# Patient Record
Sex: FEMALE | Race: BLACK OR AFRICAN AMERICAN | Employment: OTHER | URBAN - METROPOLITAN AREA
[De-identification: names, ages, dates, MRNs, and addresses within clinical notes are randomized per-mention and may not be internally consistent; named-entity substitution may affect disease eponyms.]

---

## 2022-05-17 ENCOUNTER — APPOINTMENT (OUTPATIENT)
Dept: CT IMAGING | Age: 87
End: 2022-05-17
Attending: EMERGENCY MEDICINE
Payer: MEDICAID

## 2022-05-17 ENCOUNTER — HOSPITAL ENCOUNTER (EMERGENCY)
Age: 87
Discharge: ANOTHER HOSPITAL | End: 2022-05-18
Attending: EMERGENCY MEDICINE
Payer: MEDICAID

## 2022-05-17 ENCOUNTER — APPOINTMENT (OUTPATIENT)
Dept: GENERAL RADIOLOGY | Age: 87
End: 2022-05-17
Attending: EMERGENCY MEDICINE
Payer: MEDICAID

## 2022-05-17 DIAGNOSIS — I63.511 ACUTE RIGHT MCA STROKE (HCC): Primary | ICD-10-CM

## 2022-05-17 LAB
ALBUMIN SERPL-MCNC: 3 G/DL (ref 3.5–5)
ALBUMIN/GLOB SERPL: 0.6 {RATIO} (ref 1.1–2.2)
ALP SERPL-CCNC: 103 U/L (ref 45–117)
ALT SERPL-CCNC: 20 U/L (ref 12–78)
ANION GAP SERPL CALC-SCNC: ABNORMAL MMOL/L (ref 5–15)
AST SERPL-CCNC: 26 U/L (ref 15–37)
BASE EXCESS BLD CALC-SCNC: 8.8 MMOL/L
BILIRUB SERPL-MCNC: 0.5 MG/DL (ref 0.2–1)
BUN SERPL-MCNC: 17 MG/DL (ref 6–20)
BUN/CREAT SERPL: 13 (ref 12–20)
CA-I BLD-MCNC: 1.21 MMOL/L (ref 1.12–1.32)
CALCIUM SERPL-MCNC: 9.5 MG/DL (ref 8.5–10.1)
CHLORIDE BLD-SCNC: 99 MMOL/L (ref 100–108)
CHLORIDE SERPL-SCNC: 102 MMOL/L (ref 97–108)
CO2 BLD-SCNC: 40 MMOL/L (ref 19–24)
CO2 SERPL-SCNC: 39 MMOL/L (ref 21–32)
COMMENT, HOLDF: NORMAL
CREAT SERPL-MCNC: 1.31 MG/DL (ref 0.55–1.02)
CREAT UR-MCNC: 1.3 MG/DL (ref 0.6–1.3)
GLOBULIN SER CALC-MCNC: 4.7 G/DL (ref 2–4)
GLUCOSE BLD STRIP.AUTO-MCNC: 109 MG/DL (ref 74–106)
GLUCOSE BLD STRIP.AUTO-MCNC: 111 MG/DL (ref 65–117)
GLUCOSE SERPL-MCNC: 114 MG/DL (ref 65–100)
HCO3 BLDA-SCNC: 39 MMOL/L
INR PPP: 1.1 (ref 0.9–1.1)
LACTATE BLD-SCNC: 0.9 MMOL/L (ref 0.4–2)
PCO2 BLDV: 76.9 MMHG (ref 41–51)
PH BLDV: 7.31 [PH] (ref 7.32–7.42)
PO2 BLDV: 17 MMHG (ref 25–40)
POTASSIUM BLD-SCNC: 3.4 MMOL/L (ref 3.5–5.5)
POTASSIUM SERPL-SCNC: 3.4 MMOL/L (ref 3.5–5.1)
PROT SERPL-MCNC: 7.7 G/DL (ref 6.4–8.2)
PROTHROMBIN TIME: 11.2 SEC (ref 9–11.1)
SAMPLES BEING HELD,HOLD: NORMAL
SERVICE CMNT-IMP: NORMAL
SODIUM BLD-SCNC: 146 MMOL/L (ref 136–145)
SODIUM SERPL-SCNC: 140 MMOL/L (ref 136–145)
SPECIMEN SITE: ABNORMAL

## 2022-05-17 PROCEDURE — 99285 EMERGENCY DEPT VISIT HI MDM: CPT

## 2022-05-17 PROCEDURE — 84132 ASSAY OF SERUM POTASSIUM: CPT

## 2022-05-17 PROCEDURE — 80179 DRUG ASSAY SALICYLATE: CPT

## 2022-05-17 PROCEDURE — 0042T CT CODE NEURO PERF W CBF: CPT

## 2022-05-17 PROCEDURE — 70450 CT HEAD/BRAIN W/O DYE: CPT

## 2022-05-17 PROCEDURE — 70496 CT ANGIOGRAPHY HEAD: CPT

## 2022-05-17 PROCEDURE — 71045 X-RAY EXAM CHEST 1 VIEW: CPT

## 2022-05-17 PROCEDURE — 82962 GLUCOSE BLOOD TEST: CPT

## 2022-05-17 PROCEDURE — 80053 COMPREHEN METABOLIC PANEL: CPT

## 2022-05-17 PROCEDURE — 74011000636 HC RX REV CODE- 636: Performed by: EMERGENCY MEDICINE

## 2022-05-17 PROCEDURE — 36415 COLL VENOUS BLD VENIPUNCTURE: CPT

## 2022-05-17 PROCEDURE — 80143 DRUG ASSAY ACETAMINOPHEN: CPT

## 2022-05-17 PROCEDURE — 85610 PROTHROMBIN TIME: CPT

## 2022-05-17 PROCEDURE — 96374 THER/PROPH/DIAG INJ IV PUSH: CPT

## 2022-05-17 PROCEDURE — 85025 COMPLETE CBC W/AUTO DIFF WBC: CPT

## 2022-05-17 RX ORDER — SODIUM CHLORIDE 9 MG/ML
50 INJECTION, SOLUTION INTRAVENOUS ONCE
Status: DISCONTINUED | OUTPATIENT
Start: 2022-05-17 | End: 2022-05-18 | Stop reason: HOSPADM

## 2022-05-17 RX ORDER — LABETALOL HYDROCHLORIDE 5 MG/ML
10 INJECTION, SOLUTION INTRAVENOUS
Status: COMPLETED | OUTPATIENT
Start: 2022-05-17 | End: 2022-05-18

## 2022-05-17 RX ADMIN — IOPAMIDOL 100 ML: 755 INJECTION, SOLUTION INTRAVENOUS at 23:57

## 2022-05-18 ENCOUNTER — ANESTHESIA (OUTPATIENT)
Dept: INTERVENTIONAL RADIOLOGY/VASCULAR | Age: 87
DRG: 024 | End: 2022-05-18
Payer: MEDICARE

## 2022-05-18 ENCOUNTER — APPOINTMENT (OUTPATIENT)
Dept: CT IMAGING | Age: 87
DRG: 024 | End: 2022-05-18
Attending: NURSE PRACTITIONER
Payer: MEDICARE

## 2022-05-18 ENCOUNTER — HOSPITAL ENCOUNTER (INPATIENT)
Dept: INTERVENTIONAL RADIOLOGY/VASCULAR | Age: 87
Discharge: HOME | DRG: 024 | End: 2022-05-18
Attending: RADIOLOGY
Payer: MEDICARE

## 2022-05-18 ENCOUNTER — APPOINTMENT (OUTPATIENT)
Dept: GENERAL RADIOLOGY | Age: 87
DRG: 024 | End: 2022-05-18
Attending: INTERNAL MEDICINE
Payer: MEDICARE

## 2022-05-18 ENCOUNTER — HOSPITAL ENCOUNTER (INPATIENT)
Age: 87
LOS: 5 days | Discharge: NURSING, REHAB OR LONG-TERM CARE | DRG: 024 | End: 2022-05-23
Attending: EMERGENCY MEDICINE | Admitting: ANESTHESIOLOGY
Payer: MEDICARE

## 2022-05-18 ENCOUNTER — ANESTHESIA EVENT (OUTPATIENT)
Dept: INTERVENTIONAL RADIOLOGY/VASCULAR | Age: 87
DRG: 024 | End: 2022-05-18
Payer: MEDICARE

## 2022-05-18 VITALS
DIASTOLIC BLOOD PRESSURE: 78 MMHG | BODY MASS INDEX: 30.19 KG/M2 | SYSTOLIC BLOOD PRESSURE: 151 MMHG | OXYGEN SATURATION: 97 % | HEIGHT: 66 IN | RESPIRATION RATE: 22 BRPM | WEIGHT: 187.83 LBS | TEMPERATURE: 97.3 F | HEART RATE: 60 BPM

## 2022-05-18 DIAGNOSIS — R41.82 ALTERED MENTAL STATUS, UNSPECIFIED ALTERED MENTAL STATUS TYPE: ICD-10-CM

## 2022-05-18 DIAGNOSIS — I63.311 CEREBROVASCULAR ACCIDENT (CVA) DUE TO THROMBOSIS OF RIGHT MIDDLE CEREBRAL ARTERY (HCC): Primary | ICD-10-CM

## 2022-05-18 DIAGNOSIS — Z51.5 PALLIATIVE CARE BY SPECIALIST: ICD-10-CM

## 2022-05-18 DIAGNOSIS — Z71.89 GOALS OF CARE, COUNSELING/DISCUSSION: ICD-10-CM

## 2022-05-18 DIAGNOSIS — R94.01 ABNORMAL EEG: ICD-10-CM

## 2022-05-18 DIAGNOSIS — R53.1 LEFT-SIDED WEAKNESS: ICD-10-CM

## 2022-05-18 PROBLEM — I63.9 STROKE (CEREBRUM) (HCC): Status: ACTIVE | Noted: 2022-05-18

## 2022-05-18 LAB
ALBUMIN SERPL-MCNC: 2.7 G/DL (ref 3.5–5)
ALBUMIN/GLOB SERPL: 0.6 {RATIO} (ref 1.1–2.2)
ALP SERPL-CCNC: 90 U/L (ref 45–117)
ALT SERPL-CCNC: 16 U/L (ref 12–78)
ANION GAP SERPL CALC-SCNC: 1 MMOL/L (ref 5–15)
APAP SERPL-MCNC: <2 UG/ML (ref 10–30)
AST SERPL-CCNC: 24 U/L (ref 15–37)
ATRIAL RATE: 66 BPM
BASOPHILS # BLD: 0.1 K/UL (ref 0–0.1)
BASOPHILS NFR BLD: 1 % (ref 0–1)
BILIRUB SERPL-MCNC: 0.5 MG/DL (ref 0.2–1)
BUN SERPL-MCNC: 15 MG/DL (ref 6–20)
BUN/CREAT SERPL: 14 (ref 12–20)
CALCIUM SERPL-MCNC: 9 MG/DL (ref 8.5–10.1)
CALCULATED P AXIS, ECG09: -22 DEGREES
CALCULATED R AXIS, ECG10: 50 DEGREES
CALCULATED T AXIS, ECG11: -13 DEGREES
CHLORIDE SERPL-SCNC: 101 MMOL/L (ref 97–108)
CHOLEST SERPL-MCNC: 170 MG/DL
CO2 SERPL-SCNC: 37 MMOL/L (ref 21–32)
COMMENT, HOLDF: NORMAL
COVID-19 RAPID TEST, COVR: NOT DETECTED
CREAT SERPL-MCNC: 1.04 MG/DL (ref 0.55–1.02)
DIAGNOSIS, 93000: NORMAL
DIFFERENTIAL METHOD BLD: ABNORMAL
EOSINOPHIL # BLD: 0.3 K/UL (ref 0–0.4)
EOSINOPHIL NFR BLD: 4 % (ref 0–7)
ERYTHROCYTE [DISTWIDTH] IN BLOOD BY AUTOMATED COUNT: 13.4 % (ref 11.5–14.5)
ERYTHROCYTE [DISTWIDTH] IN BLOOD BY AUTOMATED COUNT: 13.7 % (ref 11.5–14.5)
EST. AVERAGE GLUCOSE BLD GHB EST-MCNC: 120 MG/DL
GLOBULIN SER CALC-MCNC: 4.5 G/DL (ref 2–4)
GLUCOSE BLD STRIP.AUTO-MCNC: 109 MG/DL (ref 65–117)
GLUCOSE BLD STRIP.AUTO-MCNC: 111 MG/DL (ref 65–117)
GLUCOSE BLD STRIP.AUTO-MCNC: 88 MG/DL (ref 65–117)
GLUCOSE BLD STRIP.AUTO-MCNC: 93 MG/DL (ref 65–117)
GLUCOSE SERPL-MCNC: 123 MG/DL (ref 65–100)
HBA1C MFR BLD: 5.8 % (ref 4–5.6)
HCT VFR BLD AUTO: 35.2 % (ref 35–47)
HCT VFR BLD AUTO: 35.2 % (ref 35–47)
HDLC SERPL-MCNC: 44 MG/DL
HDLC SERPL: 3.9 {RATIO} (ref 0–5)
HGB BLD-MCNC: 10.8 G/DL (ref 11.5–16)
HGB BLD-MCNC: 10.9 G/DL (ref 11.5–16)
IMM GRANULOCYTES # BLD AUTO: 0 K/UL (ref 0–0.04)
IMM GRANULOCYTES NFR BLD AUTO: 0 % (ref 0–0.5)
LDLC SERPL CALC-MCNC: 111.8 MG/DL (ref 0–100)
LYMPHOCYTES # BLD: 0.7 K/UL (ref 0.8–3.5)
LYMPHOCYTES NFR BLD: 10 % (ref 12–49)
MAGNESIUM SERPL-MCNC: 1.9 MG/DL (ref 1.6–2.4)
MCH RBC QN AUTO: 28.3 PG (ref 26–34)
MCH RBC QN AUTO: 28.5 PG (ref 26–34)
MCHC RBC AUTO-ENTMCNC: 30.7 G/DL (ref 30–36.5)
MCHC RBC AUTO-ENTMCNC: 31 G/DL (ref 30–36.5)
MCV RBC AUTO: 91.9 FL (ref 80–99)
MCV RBC AUTO: 92.4 FL (ref 80–99)
MONOCYTES # BLD: 0.7 K/UL (ref 0–1)
MONOCYTES NFR BLD: 9 % (ref 5–13)
NEUTS SEG # BLD: 5.5 K/UL (ref 1.8–8)
NEUTS SEG NFR BLD: 76 % (ref 32–75)
NRBC # BLD: 0 K/UL (ref 0–0.01)
NRBC # BLD: 0 K/UL (ref 0–0.01)
NRBC BLD-RTO: 0 PER 100 WBC
NRBC BLD-RTO: 0 PER 100 WBC
P-R INTERVAL, ECG05: 212 MS
PHOSPHATE SERPL-MCNC: 2.6 MG/DL (ref 2.6–4.7)
PLATELET # BLD AUTO: 193 K/UL (ref 150–400)
PLATELET # BLD AUTO: 194 K/UL (ref 150–400)
PMV BLD AUTO: 10.9 FL (ref 8.9–12.9)
PMV BLD AUTO: 11.2 FL (ref 8.9–12.9)
POTASSIUM SERPL-SCNC: 3.7 MMOL/L (ref 3.5–5.1)
PROT SERPL-MCNC: 7.2 G/DL (ref 6.4–8.2)
Q-T INTERVAL, ECG07: 418 MS
QRS DURATION, ECG06: 86 MS
QTC CALCULATION (BEZET), ECG08: 418 MS
RBC # BLD AUTO: 3.81 M/UL (ref 3.8–5.2)
RBC # BLD AUTO: 3.83 M/UL (ref 3.8–5.2)
RBC MORPH BLD: ABNORMAL
SALICYLATES SERPL-MCNC: <1.7 MG/DL (ref 2.8–20)
SAMPLES BEING HELD,HOLD: NORMAL
SERVICE CMNT-IMP: NORMAL
SODIUM SERPL-SCNC: 139 MMOL/L (ref 136–145)
SOURCE, COVRS: NORMAL
TRIGL SERPL-MCNC: 71 MG/DL (ref ?–150)
TSH SERPL DL<=0.05 MIU/L-ACNC: 2.65 UIU/ML (ref 0.36–3.74)
VENTRICULAR RATE, ECG03: 60 BPM
VLDLC SERPL CALC-MCNC: 14.2 MG/DL
WBC # BLD AUTO: 7.3 K/UL (ref 3.6–11)
WBC # BLD AUTO: 7.9 K/UL (ref 3.6–11)

## 2022-05-18 PROCEDURE — 74011000250 HC RX REV CODE- 250: Performed by: NURSE PRACTITIONER

## 2022-05-18 PROCEDURE — 99223 1ST HOSP IP/OBS HIGH 75: CPT | Performed by: PSYCHIATRY & NEUROLOGY

## 2022-05-18 PROCEDURE — 77030008584 HC TOOL GDWRE DEV TERU -A

## 2022-05-18 PROCEDURE — C1769 GUIDE WIRE: HCPCS

## 2022-05-18 PROCEDURE — C1894 INTRO/SHEATH, NON-LASER: HCPCS

## 2022-05-18 PROCEDURE — 65610000006 HC RM INTENSIVE CARE

## 2022-05-18 PROCEDURE — C1887 CATHETER, GUIDING: HCPCS

## 2022-05-18 PROCEDURE — 74011250636 HC RX REV CODE- 250/636

## 2022-05-18 PROCEDURE — 74011000250 HC RX REV CODE- 250: Performed by: INTERNAL MEDICINE

## 2022-05-18 PROCEDURE — 77030012468 HC VLV BLEEDBK CNTRL ABBT -B

## 2022-05-18 PROCEDURE — 97530 THERAPEUTIC ACTIVITIES: CPT

## 2022-05-18 PROCEDURE — APPNB45 APP NON BILLABLE 31-45 MINUTES: Performed by: NURSE PRACTITIONER

## 2022-05-18 PROCEDURE — 97166 OT EVAL MOD COMPLEX 45 MIN: CPT

## 2022-05-18 PROCEDURE — 74011000250 HC RX REV CODE- 250: Performed by: EMERGENCY MEDICINE

## 2022-05-18 PROCEDURE — 85027 COMPLETE CBC AUTOMATED: CPT

## 2022-05-18 PROCEDURE — 82962 GLUCOSE BLOOD TEST: CPT

## 2022-05-18 PROCEDURE — 77030013797 HC KT TRNSDUC PRSSR EDWD -A

## 2022-05-18 PROCEDURE — 77030035304 HC CATH ANGI BENCHMARK PENU -G

## 2022-05-18 PROCEDURE — 83036 HEMOGLOBIN GLYCOSYLATED A1C: CPT

## 2022-05-18 PROCEDURE — 84100 ASSAY OF PHOSPHORUS: CPT

## 2022-05-18 PROCEDURE — 95816 EEG AWAKE AND DROWSY: CPT | Performed by: NURSE PRACTITIONER

## 2022-05-18 PROCEDURE — 2709999900 HC NON-CHARGEABLE SUPPLY

## 2022-05-18 PROCEDURE — 36415 COLL VENOUS BLD VENIPUNCTURE: CPT

## 2022-05-18 PROCEDURE — C1760 CLOSURE DEV, VASC: HCPCS

## 2022-05-18 PROCEDURE — 93005 ELECTROCARDIOGRAM TRACING: CPT

## 2022-05-18 PROCEDURE — 87635 SARS-COV-2 COVID-19 AMP PRB: CPT

## 2022-05-18 PROCEDURE — 77030008638 HC TU CONN COOK -A

## 2022-05-18 PROCEDURE — 77030003394 HC NDL ART COOK -A

## 2022-05-18 PROCEDURE — 80061 LIPID PANEL: CPT

## 2022-05-18 PROCEDURE — 71045 X-RAY EXAM CHEST 1 VIEW: CPT

## 2022-05-18 PROCEDURE — 74011250636 HC RX REV CODE- 250/636: Performed by: NURSE PRACTITIONER

## 2022-05-18 PROCEDURE — 97535 SELF CARE MNGMENT TRAINING: CPT

## 2022-05-18 PROCEDURE — 99285 EMERGENCY DEPT VISIT HI MDM: CPT

## 2022-05-18 PROCEDURE — 03CG3ZZ EXTIRPATION OF MATTER FROM INTRACRANIAL ARTERY, PERCUTANEOUS APPROACH: ICD-10-PCS | Performed by: RADIOLOGY

## 2022-05-18 PROCEDURE — 96374 THER/PROPH/DIAG INJ IV PUSH: CPT

## 2022-05-18 PROCEDURE — 74011000250 HC RX REV CODE- 250

## 2022-05-18 PROCEDURE — 77030029997 HC DEV COM RDL R BND TELE -B

## 2022-05-18 PROCEDURE — 70450 CT HEAD/BRAIN W/O DYE: CPT

## 2022-05-18 PROCEDURE — B31R1ZZ FLUOROSCOPY OF INTRACRANIAL ARTERIES USING LOW OSMOLAR CONTRAST: ICD-10-PCS | Performed by: RADIOLOGY

## 2022-05-18 PROCEDURE — 77030020268 HC MISC GENERAL SUPPLY

## 2022-05-18 PROCEDURE — 74011000250 HC RX REV CODE- 250: Performed by: RADIOLOGY

## 2022-05-18 PROCEDURE — 92610 EVALUATE SWALLOWING FUNCTION: CPT

## 2022-05-18 PROCEDURE — 83735 ASSAY OF MAGNESIUM: CPT

## 2022-05-18 PROCEDURE — 74011250636 HC RX REV CODE- 250/636: Performed by: RADIOLOGY

## 2022-05-18 PROCEDURE — 61645 PERQ ART M-THROMBECT &/NFS: CPT

## 2022-05-18 PROCEDURE — 80053 COMPREHEN METABOLIC PANEL: CPT

## 2022-05-18 PROCEDURE — 74011000636 HC RX REV CODE- 636: Performed by: RADIOLOGY

## 2022-05-18 PROCEDURE — 97161 PT EVAL LOW COMPLEX 20 MIN: CPT

## 2022-05-18 PROCEDURE — 84443 ASSAY THYROID STIM HORMONE: CPT

## 2022-05-18 RX ORDER — MAGNESIUM SULFATE 100 %
4 CRYSTALS MISCELLANEOUS AS NEEDED
Status: DISCONTINUED | OUTPATIENT
Start: 2022-05-18 | End: 2022-05-24 | Stop reason: HOSPADM

## 2022-05-18 RX ORDER — ATORVASTATIN CALCIUM 40 MG/1
40 TABLET, FILM COATED ORAL DAILY
Status: DISCONTINUED | OUTPATIENT
Start: 2022-05-18 | End: 2022-05-19

## 2022-05-18 RX ORDER — INSULIN LISPRO 100 [IU]/ML
INJECTION, SOLUTION INTRAVENOUS; SUBCUTANEOUS EVERY 6 HOURS
Status: DISCONTINUED | OUTPATIENT
Start: 2022-05-18 | End: 2022-05-19

## 2022-05-18 RX ORDER — SODIUM CHLORIDE 0.9 % (FLUSH) 0.9 %
5-40 SYRINGE (ML) INJECTION AS NEEDED
Status: DISCONTINUED | OUTPATIENT
Start: 2022-05-18 | End: 2022-05-24 | Stop reason: HOSPADM

## 2022-05-18 RX ORDER — LEVOTHYROXINE SODIUM 75 UG/1
75 TABLET ORAL
Status: DISCONTINUED | OUTPATIENT
Start: 2022-05-18 | End: 2022-05-18

## 2022-05-18 RX ORDER — BRIMONIDINE TARTRATE 2 MG/ML
1 SOLUTION/ DROPS OPHTHALMIC 2 TIMES DAILY
Status: DISCONTINUED | OUTPATIENT
Start: 2022-05-18 | End: 2022-05-24 | Stop reason: HOSPADM

## 2022-05-18 RX ORDER — HEPARIN SODIUM 1000 [USP'U]/ML
2000 INJECTION, SOLUTION INTRAVENOUS; SUBCUTANEOUS ONCE
Status: COMPLETED | OUTPATIENT
Start: 2022-05-18 | End: 2022-05-18

## 2022-05-18 RX ORDER — VERAPAMIL HYDROCHLORIDE 2.5 MG/ML
INJECTION, SOLUTION INTRAVENOUS
Status: COMPLETED
Start: 2022-05-18 | End: 2022-05-18

## 2022-05-18 RX ORDER — LEVOTHYROXINE SODIUM 125 UG/1
125 TABLET ORAL
COMMUNITY
End: 2022-05-23

## 2022-05-18 RX ORDER — SODIUM CHLORIDE, SODIUM LACTATE, POTASSIUM CHLORIDE, CALCIUM CHLORIDE 600; 310; 30; 20 MG/100ML; MG/100ML; MG/100ML; MG/100ML
50 INJECTION, SOLUTION INTRAVENOUS CONTINUOUS
Status: DISPENSED | OUTPATIENT
Start: 2022-05-18 | End: 2022-05-19

## 2022-05-18 RX ORDER — ACETAMINOPHEN 325 MG/1
650 TABLET ORAL
Status: DISCONTINUED | OUTPATIENT
Start: 2022-05-18 | End: 2022-05-24 | Stop reason: HOSPADM

## 2022-05-18 RX ORDER — LIDOCAINE HYDROCHLORIDE 20 MG/ML
10 INJECTION, SOLUTION INFILTRATION; PERINEURAL
Status: COMPLETED | OUTPATIENT
Start: 2022-05-18 | End: 2022-05-18

## 2022-05-18 RX ORDER — TIMOLOL MALEATE 5 MG/ML
1 SOLUTION/ DROPS OPHTHALMIC 2 TIMES DAILY
Status: DISCONTINUED | OUTPATIENT
Start: 2022-05-18 | End: 2022-05-24 | Stop reason: HOSPADM

## 2022-05-18 RX ORDER — HEPARIN SODIUM 1000 [USP'U]/ML
INJECTION, SOLUTION INTRAVENOUS; SUBCUTANEOUS
Status: COMPLETED
Start: 2022-05-18 | End: 2022-05-18

## 2022-05-18 RX ORDER — LEVOTHYROXINE SODIUM 112 UG/1
112 TABLET ORAL
Status: DISCONTINUED | OUTPATIENT
Start: 2022-05-19 | End: 2022-05-24 | Stop reason: HOSPADM

## 2022-05-18 RX ORDER — TORSEMIDE 10 MG/1
10 TABLET ORAL DAILY
Status: ON HOLD | COMMUNITY
End: 2022-09-26

## 2022-05-18 RX ORDER — ESMOLOL HYDROCHLORIDE 10 MG/ML
0-200 INJECTION, SOLUTION INTRAVENOUS
Status: DISCONTINUED | OUTPATIENT
Start: 2022-05-18 | End: 2022-05-18 | Stop reason: HOSPADM

## 2022-05-18 RX ORDER — VERAPAMIL HYDROCHLORIDE 2.5 MG/ML
2.5 INJECTION, SOLUTION INTRAVENOUS ONCE
Status: COMPLETED | OUTPATIENT
Start: 2022-05-18 | End: 2022-05-18

## 2022-05-18 RX ORDER — LOSARTAN POTASSIUM 100 MG/1
100 TABLET ORAL DAILY
Status: ON HOLD | COMMUNITY
End: 2022-09-26

## 2022-05-18 RX ORDER — BRIMONIDINE TARTRATE, TIMOLOL MALEATE 2; 5 MG/ML; MG/ML
1 SOLUTION/ DROPS OPHTHALMIC 2 TIMES DAILY
COMMUNITY

## 2022-05-18 RX ORDER — ACETAMINOPHEN 650 MG/1
650 SUPPOSITORY RECTAL
Status: DISCONTINUED | OUTPATIENT
Start: 2022-05-18 | End: 2022-05-24 | Stop reason: HOSPADM

## 2022-05-18 RX ORDER — SODIUM CHLORIDE 0.9 % (FLUSH) 0.9 %
5-40 SYRINGE (ML) INJECTION EVERY 8 HOURS
Status: DISCONTINUED | OUTPATIENT
Start: 2022-05-18 | End: 2022-05-24 | Stop reason: HOSPADM

## 2022-05-18 RX ADMIN — HEPARIN SODIUM 2000 UNITS: 1000 INJECTION INTRAVENOUS; SUBCUTANEOUS at 01:48

## 2022-05-18 RX ADMIN — Medication 4000 UNITS: at 01:50

## 2022-05-18 RX ADMIN — TIMOLOL MALEATE 1 DROP: 5 SOLUTION OPHTHALMIC at 17:42

## 2022-05-18 RX ADMIN — Medication 4000 UNITS: at 01:45

## 2022-05-18 RX ADMIN — SODIUM CHLORIDE, PRESERVATIVE FREE 5 ML: 5 INJECTION INTRAVENOUS at 15:46

## 2022-05-18 RX ADMIN — Medication 4000 UNITS: at 01:46

## 2022-05-18 RX ADMIN — BRIMONIDINE TARTRATE 1 DROP: 2 SOLUTION/ DROPS OPHTHALMIC at 12:38

## 2022-05-18 RX ADMIN — SODIUM CHLORIDE 2.5 MG/HR: 9 INJECTION, SOLUTION INTRAVENOUS at 19:19

## 2022-05-18 RX ADMIN — TIMOLOL MALEATE 1 DROP: 5 SOLUTION OPHTHALMIC at 12:38

## 2022-05-18 RX ADMIN — SODIUM CHLORIDE, PRESERVATIVE FREE 10 ML: 5 INJECTION INTRAVENOUS at 20:11

## 2022-05-18 RX ADMIN — NITROGLYCERIN 200 MCG: 10 INJECTION INTRAVENOUS at 01:48

## 2022-05-18 RX ADMIN — HEPARIN SODIUM 2000 UNITS: 1000 INJECTION, SOLUTION INTRAVENOUS; SUBCUTANEOUS at 01:48

## 2022-05-18 RX ADMIN — SODIUM CHLORIDE 7.5 MG/HR: 9 INJECTION, SOLUTION INTRAVENOUS at 08:52

## 2022-05-18 RX ADMIN — Medication 4000 UNITS: at 01:48

## 2022-05-18 RX ADMIN — LIDOCAINE HYDROCHLORIDE 200 MG: 20 INJECTION, SOLUTION INFILTRATION; PERINEURAL at 08:07

## 2022-05-18 RX ADMIN — Medication 4000 UNITS: at 01:49

## 2022-05-18 RX ADMIN — VERAPAMIL HYDROCHLORIDE 2.5 MG: 2.5 INJECTION, SOLUTION INTRAVENOUS at 01:48

## 2022-05-18 RX ADMIN — SODIUM CHLORIDE 10 MG/HR: 9 INJECTION, SOLUTION INTRAVENOUS at 06:15

## 2022-05-18 RX ADMIN — Medication 4000 UNITS: at 01:47

## 2022-05-18 RX ADMIN — BRIMONIDINE TARTRATE 1 DROP: 2 SOLUTION/ DROPS OPHTHALMIC at 17:41

## 2022-05-18 RX ADMIN — SODIUM CHLORIDE 7.5 MG/HR: 9 INJECTION, SOLUTION INTRAVENOUS at 15:47

## 2022-05-18 RX ADMIN — SODIUM CHLORIDE 5 MG/HR: 9 INJECTION, SOLUTION INTRAVENOUS at 03:27

## 2022-05-18 RX ADMIN — LABETALOL HYDROCHLORIDE 10 MG: 5 INJECTION INTRAVENOUS at 00:00

## 2022-05-18 RX ADMIN — SODIUM CHLORIDE 7.5 MG/HR: 9 INJECTION, SOLUTION INTRAVENOUS at 12:23

## 2022-05-18 RX ADMIN — IOPAMIDOL 75 ML: 612 INJECTION, SOLUTION INTRAVENOUS at 02:30

## 2022-05-18 NOTE — H&P
CRITICAL CARE ADMISSION NOTE      Name: Akanksha Vivar   : 1928   MRN: [id number]   Date: 2022      Reason for ICU Admission: Acute right MCA stroke s/p mechanical thrombectomy    ICU PROBLEM LIST   Acute right MCA stroke  S/p mechanical thrombectomy    HISTORY OF PRESENT ILLNESS:   81 y/o female past medical history of hypertension, COPD, pulmonary HTN. HFpEF, PPM, diabetes, hypothyroidism, and chronic lumbar stenosis/dengenerative spondylolisthesis s/p laminectomy (walker at baseline) admitted  from ED [facility] for endovascular stroke treatment of acute right M1/M2 occlusion after presenting with acute onset of left-sided weakness and AMS. History otained per chart review she had acute onset of left-sided weakness ~2100. In ED hypertensive /, administered labetalol, CT demonstrated acute distal M1/proximal M2 right MCA occlusion, tPA not administered d/t persistent hypertension. Transferred emergently to [address] [place] [address] for endovascular stroke treatment. NIHSS prior to thrombectomy NIHSS 19.   Transferred to the ICU post thrombectomy for close neurological monitoring    24 HOUR EVENTS:   · Transferred to ICU post mechanical thrombectomy  · SBP goal 100-140 mm Hg  ·   NEUROLOGICAL:    Acute right MCA occlusion s/p mechanical thrombectomy  · Neurological checks  · Strict -140 mm Hg  · Brain MRI- pending  · PT/OT/SLP    PULMONOLOGY: Hx of COPD, pulmonary HTN, nocturnal oxygen   · Supplemental oxygen for saturation goal > 94 %  · Routine Pulmonary hygiene, Aspiration precautions    CARDIOVASCULAR: Hx of HFpEF, PPM   · Nicardipine for SBP goal 100-140 mm HG  · TTE- pending    GASTROINTESTINAL:   · NPO pending speech evaluation    RENAL/ELECTROLYTE/FLUIDS:   · Avoid nephrotoxins, goal urine output >0.5 cc/kg/hr    ENDOCRINE: Hx of Diabetes and hypothyroidism   · Hemoglobin A1c,TSH-pending  · Glucose Checks, SSI    HEMATOLOGY/ONCOLOGY:   · H/H Stable  · SCDs for DVT prophylaxis    ID/MICRO:   · Febrile, no signs/symptoms of infection    ANTIBIOTICS TO DATE:  · Not indicated  ·   CULTURES TO DATE:  · None    ICU DAILY CHECKLIST     Code Status: FULl  DVT Prophylaxis:SCDs  Tubes: None  Lines: Peripheral IV  Drains: None  PPI:Not indicated  Diet: NPO pending Speech Evaluation  PT/OT: Ordered  Goals of Care Discussion/PalliativeN/A   Plan of Care/Family Update:  Discussed Care Plan with Bedside RN    HOSPITAL COURSE/DAILY EVENT LOG       SUBJECTIVE:   Progress Note: 5/18/2022        Review of Systems:     ROS     Past Medical History:      has a past medical history of Arthritis, Diabetes ([address] Hard of hearing, Hypertension, Hypothyroidism, and Poor historian. Past Surgical History:      has a past surgical history that includes hx tubal ligation. Home Medications:     Prior to Admission medications    Medication Sig Start Date End Date Taking? Authorizing Provider   POTASSIUM CHLORIDE PO Take 40 mEq by mouth nightly. Provider, Historical   atorvastatin (LIPITOR) 20 mg tablet Take 20 mg by mouth daily. Provider, Historical   metFORMIN (GLUCOPHAGE) 500 mg tablet Take 500 mg by mouth two (2) times daily (with meals). Provider, Historical   levothyroxine (SYNTHROID) 75 mcg tablet Take 75 mcg by mouth Daily (before breakfast). Provider, Historical   chlorthalidone (HYGROTEN) 25 mg tablet Take 25 mg by mouth daily. Provider, Historical   telmisartan (MICARDIS) 80 mg tablet Take 80 mg by mouth daily. Provider, Historical       Allergies/Social/Family History:     No Known Allergies   Social History     Tobacco Use    Smoking status: Former Smoker    Smokeless tobacco: Not on file   Substance Use Topics    Alcohol use: No      No family history on file. OBJECTIVE:     Physical Exam  Vitals reviewed. Constitutional:       Appearance: She is ill-appearing. HENT:      Head: Normocephalic and atraumatic.       Nose: Nose normal.   Eyes:      Pupils: Pupils are equal, round, and reactive to light. Cardiovascular:      Rate and Rhythm: Regular rhythm. Pulses: Normal pulses. Heart sounds: Normal heart sounds. Abdominal:      General: Abdomen is flat. Bowel sounds are normal.      Palpations: Abdomen is soft. Musculoskeletal:         General: Normal range of motion. Cervical back: Normal range of motion. Right lower leg: Edema present. Left lower leg: No edema. Skin:     General: Skin is warm and dry. Neurological:      Mental Status: She is alert. Comments: Awake, + dysarthria, mild facial assymmetry moves RUE/RLE spontaneously, sensation intact  LUE +antigravity effort, LLE, slight movement on plan of bed   Psychiatric:         Mood and Affect: Mood normal.          Vital Signs:  Visit Vitals  BP (!) 170/98 (BP 1 Location: Left upper arm)   Pulse 64   Resp 20   SpO2 100%    O2 Flow Rate (L/min): 2 l/min O2 Device: Nasal cannula Temp (24hrs), Av.3 °F (36.3 °C), Min:97.3 °F (36.3 °C), Max:97.3 °F (36.3 °C)           Intake/Output:   No intake or output data in the 24 hours ending 22 0438      LABS AND  DATA: Personally reviewed  Recent Labs     22   WBC 7.3   HGB 10.9*   HCT 35.2        Recent Labs     22      K 3.4*      CO2 39*   BUN 17   CREA 1.31*   *   CA 9.5     Recent Labs     22  230      TP 7.7   ALB 3.0*   GLOB 4.7*     Recent Labs     22   INR 1.1   PTP 11.2*      No results for input(s): PHI, PCO2I, PO2I, FIO2I in the last 72 hours. No results for input(s): CPK, CKMB, TROIQ, BNPP in the last 72 hours. MEDS: Reviewed    Chest X-Ray:  CXR Results  (Last 48 hours)               22 0022  XR CHEST PORT Final result    Impression:  No acute process. Narrative:  INDICATION: ams       EXAM:  AP CHEST RADIOGRAPH       COMPARISON: None       FINDINGS:       AP portable view of the chest demonstrates normal heart size. Right subclavian   pacemaker. There is no edema, effusion, consolidation, or pneumothorax. The   osseous structures are unremarkable. ECHO:    No results found for this visit on 05/18/22. Multidisciplinary Rounds Completed:  Pending    ABCDEF Bundle/Checklist Completed:  Yes    SPECIAL EQUIPMENT  None    DISPOSITION  Stay in ICU    CRITICAL CARE DOCUMENTATION  I had a face to face encounter with the patient, reviewed and interpreted patient data including clinical events, labs, images, vital signs, I/O's, and examined patient. I have discussed the case and the plan and management of the patient's care with the consulting services, the bedside nurses and the respiratory therapist.      NOTE OF PERSONAL INVOLVEMENT IN CARE   This patient has a high probability of imminent, clinically significant deterioration, which requires the highest level of preparedness to intervene urgently. I participated in the decision-making and personally managed or directed the management of the following life and organ supporting interventions that required my frequent assessment to treat or prevent imminent deterioration. I personally spent 60 minutes of critical care time. This is time spent at this critically ill patient's bedside actively involved in patient care as well as the coordination of care. This does not include any procedural time which has been billed separately.     [address] [place] [address]  5/18/2022

## 2022-05-18 NOTE — ED PROVIDER NOTES
66-year-old female sent from [facility] with acute CVA with LVO in the right middle cerebral artery. Patient did not fall within the timeframe for tPA. Symptoms have not worsened. Neuro intervention will be taken patient for acute thrombectomy. Neurology team at bedside. Will be admitted to ICU.         Recent Results (from the past 24 hour(s))  -GLUCOSE, POC:   Collection Time: 05/17/22 10:56 PM       Result                      Value             Ref Range           Glucose (POC)               111               65 - 117 mg/*       Performed by                                                  Cee CARPIO  -CBC WITH AUTOMATED DIFF:   Collection Time: 05/17/22 11:00 PM       Result                      Value             Ref Range           WBC                         7.3               3.6 - 11.0 K*       RBC                         3.83              3.80 - 5.20 *       HGB                         10.9 (L)          11.5 - 16.0 *       HCT                         35.2              35.0 - 47.0 %       MCV                         91.9              80.0 - 99.0 *       MCH                         28.5              26.0 - 34.0 *       MCHC                        31.0              30.0 - 36.5 *       RDW                         13.7              11.5 - 14.5 %       PLATELET                    194               150 - 400 K/*       MPV                         10.9              8.9 - 12.9 FL       NRBC                        0.0               0  WBC       ABSOLUTE NRBC               0.00              0.00 - 0.01 *       NEUTROPHILS                 76 (H)            32 - 75 %           LYMPHOCYTES                 10 (L)            12 - 49 %           MONOCYTES                   9                 5 - 13 %            EOSINOPHILS                 4                 0 - 7 %             BASOPHILS                   1                 0 - 1 %             IMMATURE GRANULOCYTES       0                 0.0 - 0.5 % ABS. NEUTROPHILS            5.5               1.8 - 8.0 K/*       ABS. LYMPHOCYTES            0.7 (L)           0.8 - 3.5 K/*       ABS. MONOCYTES              0.7               0.0 - 1.0 K/*       ABS. EOSINOPHILS            0.3               0.0 - 0.4 K/*       ABS. BASOPHILS              0.1               0.0 - 0.1 K/*       ABS. IMM. GRANS.            0.0               0.00 - 0.04 *       DF                          SMEAR SCANNED                         RBC COMMENTS                                                  NORMOCYTIC, NORMOCHROMIC  -METABOLIC PANEL, COMPREHENSIVE:   Collection Time: 05/17/22 11:00 PM       Result                      Value             Ref Range           Sodium                      140               136 - 145 mm*       Potassium                   3.4 (L)           3.5 - 5.1 mm*       Chloride                    102               97 - 108 mmo*       CO2                         39 (H)            21 - 32 mmol*       Anion gap                   NEG 1             5 - 15 mmol/L       Glucose                     114 (H)           65 - 100 mg/*       BUN                         17                6 - 20 MG/DL        Creatinine                  1.31 (H)          0.55 - 1.02 *       BUN/Creatinine ratio        13                12 - 20             GFR est AA                  46 (L)            >60 ml/min/1*       GFR est non-AA              38 (L)            >60 ml/min/1*       Calcium                     9.5               8.5 - 10.1 M*       Bilirubin, total            0.5               0.2 - 1.0 MG*       ALT (SGPT)                  20                12 - 78 U/L         AST (SGOT)                  26                15 - 37 U/L         Alk.  phosphatase            103               45 - 117 U/L        Protein, total              7.7               6.4 - 8.2 g/*       Albumin                     3.0 (L)           3.5 - 5.0 g/*       Globulin                    4.7 (H)           2.0 - 4.0 g/* A-G Ratio                   0.6 (L)           1.1 - 2.2      -SAMPLES BEING HELD:   Collection Time: 05/17/22 11:00 PM       Result                      Value             Ref Range           SAMPLES BEING HELD          BLUE, PST                             COMMENT                                                       Add-on orders for these samples will be processed based on acceptable specimen integrity and analyte stability, which may vary by analyte.   -PROTHROMBIN TIME + INR:   Collection Time: 05/17/22 11:00 PM       Result                      Value             Ref Range           INR                         1.1               0.9 - 1.1           Prothrombin time            11.2 (H)          9.0 - 51.6 s*  -SALICYLATE:   Collection Time: 05/17/22 11:01 PM       Result                      Value             Ref Range           Salicylate level            <1.7 (L)          2.8 - 20.0 M*  -ACETAMINOPHEN:   Collection Time: 05/17/22 11:01 PM       Result                      Value             Ref Range           Acetaminophen level         <2 (L)            10 - 30 ug/mL  -BLOOD GAS,CHEM8,LACTIC ACID POC:   Collection Time: 05/17/22 11:05 PM       Result                      Value             Ref Range           Calcium, ionized (POC)      1.21              1.12 - 1.32 *       BICARBONATE                 39                mmol/L              Base excess (POC)           8.8               mmol/L              Sample source               VENOUS BLOOD                          CO2, POC                    40 (H)            19 - 24 MMOL*       Sodium, POC                 146 (H)           136 - 145 MM*       Potassium, POC              3.4 (L)           3.5 - 5.5 MM*       Chloride, POC               99 (L)            100 - 108 MM*       Glucose, POC                109 (H)           74 - 106 MG/*       Creatinine, POC             1.3               0.6 - 1.3 MG*       Lactic Acid (POC)           0.90              0.40 - 2.00 *       pH, venous (POC)            7.31 (L)          7.32 - 7.42         pCO2, venous (POC)          76.9 (H)          41 - 51 MMHG        pO2, venous (POC)           17 (L)            25 - 40 mmHg   -SAMPLES BEING HELD:   Collection Time: 05/17/22 11:45 PM       Result                      Value             Ref Range           SAMPLES BEING HELD          1 GOLD                                COMMENT                                                       Add-on orders for these samples will be processed based on acceptable specimen integrity and analyte stability, which may vary by analyte.  -EKG, 12 LEAD, INITIAL:   Collection Time: 05/18/22 12:06 AM       Result                      Value             Ref Range           Ventricular Rate            60                BPM                 Atrial Rate                 66                BPM                 P-R Interval                212               ms                  QRS Duration                86                ms                  Q-T Interval                418               ms                  QTC Calculation (Bezet)     418               ms                  Calculated P Axis           -22               degrees             Calculated R Axis           50                degrees             Calculated T Axis           -13               degrees             Diagnosis                                                     Atrial-paced rhythm with prolonged AV conduction Abnormal QRS-T angle, consider primary T wave abnormality When compared with ECG of 12-MAR-2012 01:27, Electronic atrial pacemaker has replaced Sinus rhythm Nonspecific T wave abnormality now evident in Inferior leads     XR CHEST PORT    Result Date: 5/18/2022  INDICATION: ams EXAM:  AP CHEST RADIOGRAPH COMPARISON: None FINDINGS: AP portable view of the chest demonstrates normal heart size. Right subclavian pacemaker. There is no edema, effusion, consolidation, or pneumothorax.  The osseous structures are unremarkable. No acute process. CTA CODE NEURO HEAD AND NECK W CONT    Result Date: 5/18/2022  **PRELIMINARY REPORT** Acute occlusion distal M1/proximal M2 right middle cerebral artery. Mismatched perfusion, 89 cc, right MCA territory. Preliminary report was provided by Dr. Dk Murcia to Dr. Gerre Cogan, the on-call radiologist, at [id number] [place] [address],[address] Final report to follow. **END PRELIMINARY REPORT**     CT CODE NEURO HEAD WO CONTRAST    Result Date: 5/17/2022  INDICATION: Code Stroke EXAM:  HEAD CT WITHOUT CONTRAST COMPARISON: None TECHNIQUE:  Routine noncontrast axial head CT was performed. Sagittal and coronal reconstructions were generated. CT dose reduction was achieved through use of a standardized protocol tailored for this examination and automatic exposure control for dose modulation. FINDINGS: Ventricles: Midline, no hydrocephalus. Intracranial Hemorrhage: None. Brain Parenchyma/Brainstem: Subtle hypodensity right thalamus. Dense right middle cerebral artery. Basal Cisterns: Normal. Paranasal Sinuses: Visualized sinuses are clear. Additional Comments: N/A. Dense right middle cerebral artery. Small hypodensity right thalamus, age indeterminate. The findings were called to Dr. Gerre Cogan on 5/17/2022 at [phone] by myself. 789. CT CODE NEURO PERF W CBF    Result Date: 5/18/2022  **PRELIMINARY REPORT** Acute occlusion distal M1/proximal M2 right middle cerebral artery. Mismatched perfusion, 89 cc, right MCA territory. Preliminary report was provided by Dr. Dk Murcia to Dr. Gerre Cogan, the on-call radiologist, at [address] [place] [address],[address] Final report to follow. **END PRELIMINARY REPORT**                Past Medical History:   Diagnosis Date    Arthritis     Diabetes ([address]     Hard of hearing     Hypertension     Hypothyroidism     Poor historian        Past Surgical History:   Procedure Laterality Date    HX TUBAL LIGATION           No family history on file.     Social History     Socioeconomic History    Marital status:      Spouse name: Not on file    Number of children: Not on file    Years of education: Not on file    Highest education level: Not on file   Occupational History    Not on file   Tobacco Use    Smoking status: Former Smoker    Smokeless tobacco: Not on file   Substance and Sexual Activity    Alcohol use: No    Drug use: Not on file    Sexual activity: Not on file   Other Topics Concern    Not on file   Social History Narrative    Not on file     Social Determinants of Health     Financial Resource Strain:     Difficulty of Paying Living Expenses: Not on file   Food Insecurity:     Worried About [address] in the Last Year: Not on file    Felipa of Food in the Last Year: Not on file   Transportation Needs:     Lack of Transportation (Medical): Not on file    Lack of Transportation (Non-Medical): Not on file   Physical Activity:     Days of Exercise per Week: Not on file    Minutes of Exercise per Session: Not on file   Stress:     Feeling of Stress : Not on file   Social Connections:     Frequency of Communication with Friends and Family: Not on file    Frequency of Social Gatherings with Friends and Family: Not on file    Attends Gnosticist Services: Not on file    Active Member of [address] [place] [address] [employer] or Organizations: Not on file    Attends Club or Organization Meetings: Not on file    Marital Status: Not on file   Intimate Partner Violence:     Fear of Current or Ex-Partner: Not on file    Emotionally Abused: Not on file    Physically Abused: Not on file    Sexually Abused: Not on file   Housing Stability:     Unable to Pay for Housing in the Last Year: Not on file    Number of [place] in the Last Year: Not on file    Unstable Housing in the Last Year: Not on file         ALLERGIES: Patient has no known allergies. Review of Systems   Neurological: Positive for weakness and numbness. All other systems reviewed and are negative.       Vitals:    05/18/22 0056   BP: (!) 170/98   Pulse: 64 Resp: 20   SpO2: 100%            Physical Exam  Constitutional:       Appearance: She is well-developed. HENT:      Head: Normocephalic. Eyes:      Conjunctiva/sclera: Conjunctivae normal.   Cardiovascular:      Rate and Rhythm: Normal rate and regular rhythm. Pulmonary:      Effort: Pulmonary effort is normal. No respiratory distress. Breath sounds: Normal breath sounds. Abdominal:      General: Bowel sounds are normal.      Palpations: Abdomen is soft. Tenderness: There is no abdominal tenderness. Musculoskeletal:         General: Normal range of motion. Cervical back: Normal range of motion and neck supple. Skin:     General: Skin is warm. Capillary Refill: Capillary refill takes less than 2 seconds. Findings: No rash. Neurological:      Sensory: Sensory deficit present. Motor: Weakness present.       Comments: No gross motor or sensory deficits          MDM       Procedures

## 2022-05-18 NOTE — PROGRESS NOTES
Reviewed chart for transitions of care, and discussed in rounds. CM spoke with patient's daughter Amrit Brewster [phone] to explain role and offer support. ,confirmed demographics. Baseline:   ADLs/IDALS:Independent  Previous Home Health: Has used [facility] in the past after a hospitalization  Previous SNF/IPR: [facility]  ER Contact: Kristina Brewster [phone]    Patient lives in a single  level house with 3 steps to enter. Patient is independent with ADLs/IDALs   Patient uses a walker to ambulate. Patient's preferred pharmacy is [facility] located in [employer]     Reason for Admission:  Acute right MCA stroke s/p mechanical thrombectomy     RUR Score:  13%                   Plan for utilizing home health: TBD         PCP: First and Last name:  Jordon Elder MD     Name of Practice:    Are you a current patient: Yes/No: Yes   Approximate date of last visit:    Can you participate in a virtual visit with your PCP: No                    Current Advanced Directive/Advance Care Plan: Full Code      Healthcare Decision Maker:   Click here to complete [address] including selection of the Healthcare Decision Maker Relationship (ie \"Primary\")             Primary Decision Maker: Tajzoltan Papito - Child - [phone]                  Transition of Care Plan:         Patient presented to ED [facility] ED with confusion, cool and clammy skin. Patient transferred to [facility] for Neuro IR, patient taken to IR for Right MCA thrombectomy. CM spoke with kristina Brewster via phone to introduce self and explain role. Patient lives with Sandra Mccracken and is independent with ADL's. CM confirmed demographic information. Palliative care has been consulted. Will follow for transitions of care. Patient's address is [address] [place] [address] Care management will follow for transitions of care. Shakila Thomas RN,Care Management  Care Management Interventions  PCP Verified by CM:  Yes (Dr Aj Taveras)  [address] Signup: No  Discharge Durable Medical Equipment: No  Physical Therapy Consult: Yes  Occupational Therapy Consult: Yes  Speech Therapy Consult: Yes  Support Systems: Child(piotr) (Daughter Edita Blue [phone])

## 2022-05-18 NOTE — ED TRIAGE NOTES
Pt arrives via ems from home for ams. LSN time 2100 per family. Per ems pt was cool and clammy on their arrival with normal vital signs. States family was poor historians  Pt will not open eyes, does answer questions, confused to time. Will not move lt arm or lt leg. Accucheck 111 at this time  Pts oral mucous membranes appear dry, tongue coated.

## 2022-05-18 NOTE — PROGRESS NOTES
Speech pathology  Orders received, chart reviewed, spoke with RN. Attempted to see a couple times this morning but RN reporting patient very drowsy to the point it was difficult to complete NIH. RN reports she was wide awake earlier this am. Will follow up later today for swallow evaluation as alertness allows.    Latisha Wilde M.S. CCC-SLP

## 2022-05-18 NOTE — ED NOTES
Tele neuro at bedside in ct to examine pt. Per neurologist doesn't want to adm tpa at this time due to Hypertension b/p 200/98 at this time.  This nurse remains in ct with pt

## 2022-05-18 NOTE — PROGRESS NOTES
Date/Time Last Known Well Time: 5/17/22 1730    Date/Time Discovery of Stroke Symptoms: 5/17/22 2100    NIHSS upon arrival: 19    Time to IR Suite: 0103    Time of Arterial Puncture: 0117    Start of [place] Infusion of tPA or  1st pass of recanalization device in   Target vessel: 0207    Time of Revascularization: 0212    Pretreatment TICI: 0    Post treatment TICI: 3    Procedure Stop Time(When sterile drape is off): 0227    Time of first set of VS, neuro cks, groin, and pulse checks: 0230    Time transfer to ICU: 0300    Time of last VS, neuro, groin, and pulse checks documentation before ICU caring for pt: 0245

## 2022-05-18 NOTE — PROGRESS NOTES
Problem: Dysphagia (Adult)  Goal: *Acute Goals and Plan of Care (Insert Text)  Description: Speech pathology goals initiated 5/18/2022   1. Patient will tolerate a puree diet/ thin liquids free of s/s aspiration  2. Patient will demonstrate timely mastication and complete oral clearance of soft solid with slp  Outcome: Not Met   SPEECH LANGUAGE PATHOLOGY BEDSIDE SWALLOW EVALUATION  Patient: Omar Goff (58 y.o. female)  Date: 5/18/2022  Primary Diagnosis: Stroke (cerebrum) ([facility] [I63.9]        Precautions:       ASSESSMENT :  Patient with occlusion of the distal right MCA, status post mechanical thrombectomy early this morning. Patient was drowsy earlier this morning but now wide awake and quite interactive. Her speech is dysarthric though fully intelligible and also impacted by edentulous state (doesn't wear dentures). Patient moving L arm, attempting to feed herself. Based on the objective data described below, the patient presents with moderate oral dysphagia and suspect functional pharyngeal swallow for age. Patient with prolonged mastication of solid and residue remaining in her L cheek with decreased awareness, requiring cues and assist to use liquid washes and eventual lingual sweep to clear. Wet vocal quality noted after ice chip; however, no s/s aspiration with cup/straw sips of thin liquids, puree or solid. Patient will benefit from skilled intervention to address the above impairments. Patients rehabilitation potential is considered to be Good     PLAN :  Recommendations and Planned Interventions:  -- puree diet/ thin liquids. Straw ok- slow rate  -- meds crushed  -- assistance with meals  -- strict upright positioning     Frequency/Duration: Patient will be followed by speech-language pathology 3 times a week to address goals. Discharge Recommendations: To Be Determined     SUBJECTIVE:   Patient stated oh ya'll are so nice.     OBJECTIVE:     Past Medical History:   Diagnosis Date Arthritis     Diabetes ([facility]     Hard of hearing     Hypertension     Hypothyroidism     Poor historian      Past Surgical History:   Procedure Laterality Date    HX TUBAL LIGATION       Prior Level of Function/Home Situation:   Home Situation  Support Systems: Child(piotr) (Daughter Aldo Lay [phone])  Diet prior to admission: reg/thin  Current Diet:  npo   Cognitive and Communication Status:  Neurologic State: Alert  Orientation Level: Oriented to person  Cognition: Follows commands           Oral Assessment:  Oral Assessment  Labial: Decreased seal;Left droop  Dentition: Edentulous  Oral Hygiene: black coating on tongue  Lingual: Decreased rate  Velum: Unable to visualize  Mandible: No impairment  P.O. Trials:  Patient Position: upright in bed  Vocal quality prior to P.O.: No impairment  Consistency Presented: Thin liquid; Solid;Puree; Ice chips  How Presented: Successive swallows;Straw;Cup/sip; Self-fed/presented;Spoon     Bolus Acceptance: No impairment  Bolus Formation/Control: Impaired  Type of Impairment: Delayed;Mastication; Anterior;Spillage (from L side with thin via cup)  Propulsion: Delayed (# of seconds)  Oral Residue: Greater than 50% of bolus; Left (with solid)  Initiation of Swallow:  (suspect wfl)     Aspiration Signs/Symptoms: Change vocal quality (after ice chip)          Oral Phase Severity: Moderate  Pharyngeal Phase Severity :  (suspect wfl for age)    NOMS:   The NOMS functional outcome measure was used to quantify this patient's level of swallowing impairment. Based on the NOMS, the patient was determined to be at level 4 for swallow function       NOMS Swallowing Levels:  Level 1 (CN): NPO  Level 2 (CM): NPO but takes consistency in therapy  Level 3 (CL): Takes less than 50% of nutrition p.o. and continues with nonoral feedings; and/or safe with mod cues; and/or max diet restriction  Level 4 (CK):  Safe swallow but needs mod cues; and/or mod diet restriction; and/or still requires some nonoral feeding/supplements  Level 5 (CJ): Safe swallow with min diet restriction; and/or needs min cues  Level 6 (CI): Independent with p.o.; rare cues; usually self cues; may need to avoid some foods or needs extra time  Level 7 ([address] [place] [address]): Independent for all p.o.  MARK. (2003). National Outcomes Measurement System (NOMS): Adult Speech-Language Pathology User's Guide. Pain:  Pain Scale 1: Numeric (0 - 10)  Pain Intensity 1: 0       After treatment:   Patient left in no apparent distress in bed, Call bell within reach, and Nursing notified    COMMUNICATION/EDUCATION:   Patient was educated regarding her deficit(s) of dysphagia as this relates to her diagnosis of cva. She demonstrated Fair understanding as evidenced by verbalization. The patient's plan of care including recommendations, planned interventions, and recommended diet changes were discussed with: Registered nurse. Patient/family agree to work toward stated goals and plan of care.     Thank you for this referral.  Seth Louis, SLP  Time Calculation: 18 mins

## 2022-05-18 NOTE — CONSULTS
Consult dictated. 80year-old with diabetes, hypertension presenting to [address] [place] [address] with altered mental status and not moving her left side. CTA showed occlusion of the distal right MCA, status post mechanical thrombectomy. Suspect atherothrombotic occlusion versus cardioembolism. Clinically she is alert and oriented to self/person only, does not follow commands, does talk not meaningful, spontaneously moves her right side, neglect noted on the left. Continue post thrombectomy care. Start aspirin if no bleeding on repeat CT. PT/OT/ST evaluations.   Follow-up echocardiogram.  Shagufta Irving MD

## 2022-05-18 NOTE — ED NOTES
Esmolol gtt started prior to pt transfer goal to keep systolic bp below 563, report given to Paramedic transport team. No change in pt current neuro status. Walking - Indoors allowed/Showering allowed/Walking - Outdoors allowed/Do not drive or operate machinery/Do not make important decisions/No heavy lifting/straining/Stairs allowed

## 2022-05-18 NOTE — PROGRESS NOTES
Post procedure: Patient is extubated and on 5L NC. Baseline oxygen @2L. Patient appears sleepy now however, she'll awake and answer questions to tell me her name, age, and place. Disoriented to month and year however, she'll tell me she knows. She is very pleasant. R radial site is C/D/I. R groin is C/D/I. No bleeding/hematoma, or tenderness. NIHSS  1a  Level of consciousness: 1=not alert but arousable by minor stimulation to obey, answer or respond   1b. LOC questions:  1=Answers one question correctly   1c. LOC commands: 0=Performs both tasks correctly   2. Best Gaze: 1=partial gaze palsy; gaze is abnormal in one or both eyes, but forced deviation or total gaze paresis is not present. 3. Visual: 0=No visual loss   4. Facial Palsy: 1=Minor paralysis (flattened nasolabial fold, asymmetric on smiling)   5a. Motor left arm: 1=Drift, arm holds 90 (or 45) degrees but drifts down before full 10 seconds: does not hit bed. 5b. Motor right arm: 0=No drift, arm holds 90 (or 45) degrees for full 10 seconds   6a. Motor left le=Drift; leg falls by the end of the 5-second period but does not hit bed. 6b  Motor right le=No drift; leg holds 30-degree position for full 5 seconds. 7. Limb Ataxia: 0=Absent   8. Sensory: 1=Mild to moderate sensory loss; patient feels pinprick is less sharp or is dull on the affected side; or there is a loss of superficial pain with pinprick but patient is aware of being touched. 9. Best Language:  0=No aphasia, normal   10. Dysarthria: 1=Mild to moderate, patient slurs at least some words and at worst, can be understood with some difficulty   11.  Extinction and Inattention: 1=Visual, tactile, auditory, spatial or personal inattention or extinction to bilateral simultaneous stimulation in one of the sensory modalities    Total:    9

## 2022-05-18 NOTE — PROGRESS NOTES
Problem: Self Care Deficits Care Plan (Adult)  Goal: *Acute Goals and Plan of Care (Insert Text)  Description: FUNCTIONAL STATUS PRIOR TO ADMISSION: Patient unable to report prior level of function or home environment information at this time. Per chart review, patient independent for ADLs and uses a walker for ambulation at baseline. HOME SUPPORT: The patient lived with family but did not require assist. Family available to assist PRN per CM note. Occupational Therapy Goals  Initiated 5/18/2022  1. Patient will perform grooming sitting EOB with moderate assistance  within 7 day(s). 2.  Patient will perform lower body dressing with maximal assistance within 7 day(s). 3.  Patient will perform anterior bathing from neck to thighs with maximal assistance within 7 day(s). 4.  Patient will perform toilet transfers with maximal assistance within 7 day(s). 5.  Patient will perform all aspects of toileting with maximal assistance within 7 day(s). 6.  Patient will participate in upper extremity therapeutic exercise/activities with moderate assistance  for 5 minutes within 7 day(s). Outcome: Not Met   OCCUPATIONAL THERAPY EVALUATION  Patient: Ge Alvarado (89 y.o. female)  Date: 5/18/2022  Primary Diagnosis: Stroke (cerebrum) [facility]) [I63.9]        Precautions: Fall    ASSESSMENT  Based on the objective data described below, the patient presents with poor command following, drowsy and confused throughout, impaired sitting balance with significant posterior retropulsion, inattention to left side of body but spontaneously moving some during session, unable to test vision, sensation, etc at this time, and patient requiring total assist for all self care and functional mobility/transfers. Patient semi supine in bed and drowsy throughout session, does respond to verbal and tactile stimuli. Patient appears to have difficulty opening right eye throughout session and minimally opening left eye.  Patient verbally responds yes to every question and \"five\" every time she's asked to identify number of fingers in front of face. Patient attempted sup -> sit twice, significant posterior retropulsion with transfer, able to reach full sit on second attempt with total A for sitting balance due to patient being resistive, although verbally agreeable. Patient noted with some rigidity on left side of body with passive range, does move spontaneously during session but unable to move on command. Attempted hand over hand grooming once returned to supine with HOB raised, however, patient resistive, requiring total assist to wash face at this time. Overall patient did poor with session and is well below functional baseline independence. Patient with limited overall participation and noted plan for palliative consult, will continue to follow for goals of care. Patient would benefit from skilled OT services during admission to improve independence with self care and functional mobility/transfers. Recommend discharge to SNF at this time. Current Level of Function Impacting Discharge (ADLs/self-care): total A for functional mobility/transfers and self care tasks    Functional Outcome Measure: The patient scored Total: 0/100 on the Barthel Index outcome measure which is indicative of being total dependence in basic self-care. Other factors to consider for discharge: uses a RW at baseline, prior level of function independent for ADLs, severity of deficits, palliative consult     Patient will benefit from skilled therapy intervention to address the above noted impairments.        PLAN :  Recommendations and Planned Interventions: self care training, functional mobility training, therapeutic exercise, balance training, visual/perceptual training, therapeutic activities, cognitive retraining, endurance activities, neuromuscular re-education, patient education and family training/education    Frequency/Duration: Patient will be followed by occupational therapy 5 times a week to address goals. Recommendation for discharge: (in order for the patient to meet his/her long term goals)  Therapy up to 5 days/week in SNF setting    This discharge recommendation:  Has not yet been discussed the attending provider and/or case management    IF patient discharges home will need the following DME: TBD pending progress with therapy       SUBJECTIVE:   Patient stated You're so sweet.     OBJECTIVE DATA SUMMARY:   HISTORY:   Past Medical History:   Diagnosis Date    Arthritis     Diabetes ([address]     Hard of hearing     Hypertension     Hypothyroidism     Poor historian      Past Surgical History:   Procedure Laterality Date    HX TUBAL LIGATION         Expanded or extensive additional review of patient history:     Home Situation  Home Environment: Private residence  # Steps to Enter: 3  One/Two Story Residence: One story  Living Alone: Yes  Support Systems: Child(piotr)  Current DME Used/Available at Home: Walker, rolling    Hand dominance: patient reports right handed, poor historian    EXAMINATION OF PERFORMANCE DEFICITS:  Cognitive/Behavioral Status:  Neurologic State: Confused;Drowsy  Orientation Level: Oriented to person;Disoriented to time;Disoriented to situation;Disoriented to place (oriented to name only)  Cognition: Decreased attention/concentration;Decreased command following; Impaired decision making  Perception: Cues to attend left visual field;Cues to attend to left side of body  Perseveration: No perseveration noted  Safety/Judgement: Decreased awareness of environment;Decreased awareness of need for assistance;Decreased awareness of need for safety;Decreased insight into deficits    Skin: intact where visible    Edema: general swelling    Hearing:   Auditory  Auditory Impairment: Hard of hearing, bilateral    Vision/Perceptual:    Tracking:  (unable to follow command to complete)                 Diplopia:  (answers \"five\" for each attempt) Range of Motion:  AROM: Grossly decreased, non-functional                         Strength:  Strength: Grossly decreased, non-functional                Coordination:  Coordination: Grossly decreased, non-functional            Tone & Sensation:  Tone: Abnormal                         Balance:  Sitting: Impaired; With support  Sitting - Static: Poor (constant support)    Functional Mobility and Transfers for ADLs:  Bed Mobility:  Rolling: Assist x2;Total assistance  Supine to Sit: Assist x2;Total assistance  Sit to Supine: Assist x2;Total assistance  Scooting: Assist x2;Total assistance    Transfers: Toilet Transfer : Total assistance;Assist x2 (on and off bedpan inferred)    ADL Assessment:  Feeding: Total assistance (NPO due to drowsiness)    Oral Facial Hygiene/Grooming: Total assistance (attempted hand over hand, pt resistive)    Bathing: Total assistance (infer from functional reach and LE access)    Upper Body Dressing: Total assistance (infer from poor command following)    Lower Body Dressing: Total assistance    Toileting: Total assistance (bed level completed)                ADL Intervention and task modifications:       Grooming  Position Performed: Other (comment) (HOB raised)  Washing Face: Total assistance (dependent)                   Lower Body Dressing Assistance  Socks: Total assistance (dependent)  Leg Crossed Method Used: No  Position Performed: Supine    Toileting  Bladder Hygiene: Total assistance (dependent)  Bowel Hygiene: Total assistance (dependent)  Clothing Management: Total assistance (dependent)    Cognitive Retraining  Safety/Judgement: Decreased awareness of environment;Decreased awareness of need for assistance;Decreased awareness of need for safety;Decreased insight into deficits     Functional Measure:  Patient unable to follow commands for participation in Fugl-Dye assessment at this time.     Barthel Index:  Bathin  Bladder: 0  Bowels: 0  Groomin  Dressing: 0  Feedin  Mobility: 0  Stairs: 0  Toilet Use: 0  Transfer (Bed to Chair and Back): 0  Total: 0/100      The Barthel ADL Index: Guidelines  1. The index should be used as a record of what a patient does, not as a record of what a patient could do. 2. The main aim is to establish degree of independence from any help, physical or verbal, however minor and for whatever reason. 3. The need for supervision renders the patient not independent. 4. A patient's performance should be established using the best available evidence. Asking the patient, friends/relatives and nurses are the usual sources, but direct observation and common sense are also important. However direct testing is not needed. 5. Usually the patient's performance over the preceding 24-48 hours is important, but occasionally longer periods will be relevant. 6. Middle categories imply that the patient supplies over 50 per cent of the effort. 7. Use of aids to be independent is allowed. Score Interpretation (from [address] [address])    Independent   60-79 Minimally independent   40-59 Partially dependent   20-39 Very dependent   <20 Totally dependent     -Pamela Odonnell., Barthel, D.W. (1965). Functional evaluation: the Barthel Index. [facility] ([address] (). The Barthel activities of daily living index: self-reporting versus actual performance in the old (> or = 75 years). Journal of [address] [place] [address] 45(7), [facility], .AndreaAndreaF, Juliana Gomez., Quan Amaral. (). Measuring the change in disability after inpatient rehabilitation; comparison of the responsiveness of the Barthel Index and Functional [place] Measure. Journal of Neurology, Neurosurgery, and Psychiatry, 66(4), 687-624. Gómez Montilla, N.J.A, JONES Hopkins, & Pili Rogers M.A. (2004) Assessment of post-stroke quality of life in cost-effectiveness studies:  The usefulness of the Barthel Index and the EuroQoL-5D. Quality of Life Research, 15, 600-16     Occupational Therapy Evaluation Charge Determination   History Examination Decision-Making   MEDIUM Complexity : Expanded review of history including physical, cognitive and psychosocial  history  HIGH Complexity : 5 or more performance deficits relating to physical, cognitive , or psychosocial skils that result in activity limitations and / or participation restrictions MEDIUM Complexity : Patient may present with comorbidities that affect occupational performnce. Miniml to moderate modification of tasks or assistance (eg, physical or verbal ) with assesment(s) is necessary to enable patient to complete evaluation       Based on the above components, the patient evaluation is determined to be of the following complexity level: MEDIUM  Pain Rating:  No s/s of pain during session    Activity Tolerance:   Poor    After treatment patient left in no apparent distress:    Supine in bed, Call bell within reach, Bed / chair alarm activated, Side rails x 3 and nurse present in room    COMMUNICATION/EDUCATION:   The patients plan of care was discussed with: Physical therapist and Registered nurse. Patient is unable to participate in goal setting and plan of care. This patients plan of care is appropriate for delegation to [facility]. Patient unable to participate in education for BE FAST at this time, will provide education at a later time if appropriate.     Thank you for this referral.  Joshua Anderson OTR/ENID  Time Calculation: 18 mins

## 2022-05-18 NOTE — BRIEF OP NOTE
NEUROINTERVENTIONAL SURGERY POST-PROCEDURE NOTE    PROCEDURE:  Right MCA thrombectomy  Cerebral angiogram  Angioseal    VESSEL(S) STUDIED:  1. RCCA  2. KASSIDY VESSEL(S) TREATED:  1. R MCA M1      CSC METRICS:     1. GROIN PUNCTURE TIME: 0117     2. FIRST PASS TIME: 0207     3. FINAL RECANALIZATION TIME: 0212     4. PRE-TREATMENT TICI SCORE: 0     5. POST-TREATMENT TICI SCORE: 3    PRELIMINARY REPORT & DISPOSITION:     Challenging arch. Converted to radial.  Successful right M1 thrombectomy with Zoom 071 and Trevo 4 x 26    Sweetie CT shows a small area of staining in the right basal ganglia. COMPLICATIONS:  None immediate    FOLLOW-UP:  -140 DATE OF SERVICE:  5/18/2022 6:29 AM     ATTENDING SURGEON(S):  Karolina Tejeda MD      ANESTHESIA:   General    EBL: 10 cc    MEDICATIONS:   See nursing record    PUNCTURE SITE:  Right radial artery. VascBand. Right common femoral artery. Arteriotomy closed with 8F angioseal.  Flat with leg straight x 4 hours.

## 2022-05-18 NOTE — ED NOTES
Returned from Ct at this time. This nurse remains at bedside with critical pt. Hob elevated 30 degrees, o2 remains on at 2lnc. Cm shows paced rhythm in 60's.  Preparing to transfer pt to [facility] at this time

## 2022-05-18 NOTE — ED PROVIDER NOTES
EMERGENCY DEPARTMENT HISTORY AND PHYSICAL EXAM     ------------------------------------------------------------------------------------------------------------------------------------------------------------------------------------  Please note that this dictation was completed with [employer] the [employer] voice recognition software. Quite often unanticipated grammatical, syntax, homophones, and other interpretive errors are inadvertently transcribed by the computer software. Please disregard these errors. Please excuse any errors that have escaped final proofreading  ------------------------------------------------------------------------------------------------------------------------------------------------------------------------------------        Date: 2022  Patient Name: Nyasia Heard    History of Presenting Illness     No chief complaint on file. History Provided By:  Patient, EMS, Family     HPI: Nyasia Heard is a 80 y.o. female, pmhx hypertension, diabetes, hypothyroidism, who presents via EMS to the ED with c/o onset of left-sided weakness at 9 PM per family. Patient with left-sided neglect on initial evaluation. Patient was not preloaded as a stroke. Per EMS to nursing staff they had attempted to go to  [facility] but was diverted. Initial report to her staff was not of strokelike symptoms but altered mental status. Family arrived and confirmed sudden onset of new left-sided deficits. Notes that the cane uses a walker and had been ambulating just prior to her symptom onset. Last known well: 2100  B  EMS BP: 180/90  Anticoagulant? : NO      Social Hx: denies tobacco  denies EtOH , denies Illicit Drugs    There are no other complaints, changes, or physical findings at this time.      No Known Allergies      PCP: Jonette Councilman, MD    Facility-Administered Medications Ordered in Other Encounters   Medication Dose Route Frequency Provider Last Rate Last Admin    losartan (COZAAR) tablet 100 mg  100 mg Oral DAILY Gregorio Adams NP   100 mg at 05/19/22 0902    atorvastatin (LIPITOR) tablet 80 mg  80 mg Oral DAILY Bertha Goff NP   80 mg at 05/19/22 9131    aspirin tablet 325 mg  325 mg Oral DAILY Bertha Goff NP   325 mg at 05/19/22 0902    phosphorus (K PHOS NEUTRAL) 250 mg tablet 2 Tablet  2 Tablet Oral BID Karyle Neighbours, NP   2 Tablet at 05/19/22 0902    niCARdipine (CARDENE) 25 mg in 0.9% sodium chloride 250 mL ([id number])  0-15 mg/hr IntraVENous TITRATE Gregorio Adams NP        acetaminophen (TYLENOL) tablet 650 mg  650 mg Oral Q4H PRN Gregorio Adams NP        Or    acetaminophen (TYLENOL) solution 650 mg  650 mg Per NG tube Q4H PRN Gregorio Adams NP        Or    acetaminophen (TYLENOL) suppository 650 mg  650 mg Rectal Q4H PRN Gregorio Adams NP        sodium chloride (NS) flush 5-40 mL  5-40 mL IntraVENous Q8H Bertha Goff NP   10 mL at 05/19/22 0527    sodium chloride (NS) flush 5-40 mL  5-40 mL IntraVENous PRN Bertha Goff NP        glucose chewable tablet 16 g  4 Tablet Oral PRN Gregorio Adams NP        dextrose 10 % infusion 0-250 mL  0-250 mL IntraVENous PRN Gregorio Adams NP        glucagon (GLUCAGEN) injection 1 mg  1 mg IntraMUSCular PRN Gregorio Adams NP        insulin lispro (HUMALOG) injection   SubCUTAneous Q6H Gregorio Adams NP        levothyroxine (SYNTHROID) tablet 112 mcg  112 mcg Oral ACB Gian Haji MD   112 mcg at 05/19/22 0533    brimonidine (ALPHAGAN) 0.2 % ophthalmic solution 1 Drop  1 Drop Both Eyes BID Gian Amador MD   1 Drop at 05/19/22 0383    And    timolol (TIMOPTIC) 0.5 % ophthalmic solution 1 Drop  1 Drop Both Eyes BID Masood Rouse MD   1 Drop at 05/19/22 8312       Past History     Past Medical History:  Past Medical History:   Diagnosis Date    Arthritis     Diabetes ([facility] [address]     Hard of hearing     Hypertension     Hypothyroidism     Poor historian        Past Surgical History:  Past Surgical History:   Procedure Laterality Date    HX TUBAL LIGATION         Family History:  No family history on file. Social History:  Social History     Tobacco Use    Smoking status: Former Smoker    Smokeless tobacco: Not on file   Substance Use Topics    Alcohol use: No    Drug use: Not on file       Allergies:  No Known Allergies      Review of Systems   Review of Systems   Unable to perform ROS: Mental status change         Physical Exam   Physical Exam  Vitals and nursing note reviewed. Constitutional:       General: She is not in acute distress. Appearance: She is well-developed. She is not diaphoretic. HENT:      Head: Normocephalic and atraumatic. Nose: Nose normal.   Eyes:      General: No scleral icterus. Conjunctiva/sclera: Conjunctivae normal.   Neck:      Trachea: No tracheal deviation. Cardiovascular:      Rate and Rhythm: Normal rate and regular rhythm. Heart sounds: Normal heart sounds. No murmur heard. No friction rub. Pulmonary:      Effort: Pulmonary effort is normal. No respiratory distress. Breath sounds: Normal breath sounds. No stridor. No wheezing or rales. Abdominal:      General: Bowel sounds are normal. There is no distension. Palpations: Abdomen is soft. Tenderness: There is no abdominal tenderness. There is no rebound. Musculoskeletal:         General: Tenderness present. Cervical back: Normal range of motion. Skin:     General: Skin is warm and dry. Findings: No rash. Neurological:      Mental Status: She is alert. GCS: GCS eye subscore is 2. GCS verbal subscore is 4. GCS motor subscore is 6. Cranial Nerves: No cranial nerve deficit. Motor: Weakness and pronator drift (On left) present. Psychiatric:         Speech: Speech normal.         Behavior: Behavior normal.         Thought Content:  Thought content normal.         Judgment: Judgment normal.           Diagnostic Study Results     Labs -     Recent Results (from the past 12 hour(s))   GLUCOSE, POC    Collection Time: 05/18/22 11:37 PM   Result Value Ref Range    Glucose (POC) 88 65 - 117 mg/dL    Performed by Adilene Thibodeaux    CBC W/O DIFF    Collection Time: 05/19/22  2:46 AM   Result Value Ref Range    WBC 7.6 3.6 - 11.0 K/uL    RBC 3.76 (L) 3.80 - 5.20 M/uL    HGB 10.7 (L) 11.5 - 16.0 g/dL    HCT 34.7 (L) 35.0 - 47.0 %    MCV 92.3 80.0 - 99.0 FL    MCH 28.5 26.0 - 34.0 PG    MCHC 30.8 30.0 - 36.5 g/dL    RDW 13.4 11.5 - 14.5 %    PLATELET 969 012 - 150 K/uL    MPV 9.9 8.9 - 12.9 FL    NRBC 0.0 0  WBC    ABSOLUTE NRBC 0.00 0.00 - 8.26 K/uL   METABOLIC PANEL, COMPREHENSIVE    Collection Time: 05/19/22  2:46 AM   Result Value Ref Range    Sodium 141 136 - 145 mmol/L    Potassium 3.5 3.5 - 5.1 mmol/L    Chloride 103 97 - 108 mmol/L    CO2 37 (H) 21 - 32 mmol/L    Anion gap 1 (L) 5 - 15 mmol/L    Glucose 89 65 - 100 mg/dL    BUN 7 6 - 20 MG/DL    Creatinine 0.72 0.55 - 1.02 MG/DL    BUN/Creatinine ratio 10 (L) 12 - 20      GFR est AA >60 >60 ml/min/1.73m2    GFR est non-AA >60 >60 ml/min/1.73m2    Calcium 9.0 8.5 - 10.1 MG/DL    Bilirubin, total 0.7 0.2 - 1.0 MG/DL    ALT (SGPT) 15 12 - 78 U/L    AST (SGOT) 23 15 - 37 U/L    Alk. phosphatase 86 45 - 117 U/L    Protein, total 7.1 6.4 - 8.2 g/dL    Albumin 2.7 (L) 3.5 - 5.0 g/dL    Globulin 4.4 (H) 2.0 - 4.0 g/dL    A-G Ratio 0.6 (L) 1.1 - 2.2     MAGNESIUM    Collection Time: 05/19/22  2:46 AM   Result Value Ref Range    Magnesium 1.8 1.6 - 2.4 mg/dL   PHOSPHORUS    Collection Time: 05/19/22  2:46 AM   Result Value Ref Range    Phosphorus 2.1 (L) 2.6 - 4.7 MG/DL   GLUCOSE, POC    Collection Time: 05/19/22  5:31 AM   Result Value Ref Range    Glucose (POC) 99 65 - 117 mg/dL    Performed by Adilene Thibodeaux        Radiologic Studies -   XR CHEST PORT   Final Result   No acute process.       CTA CODE NEURO HEAD AND NECK W CONT Final Result   CTA Head:   1. Acute occlusion in the distal right M1 and M2 branches a downstream   reconstitution as above. CTA Neck:   1. Mild-moderate right vertebral artery origin stenosis. CT Brain Perfusion:   1. Elevated T-Max with smaller region of volume deficit in the right MCA   territory. Findings suggestive of tissue at ischemic risk with a smaller core   infarct. CT CODE NEURO PERF W CBF   Final Result   CTA Head:   1. Acute occlusion in the distal right M1 and M2 branches a downstream   reconstitution as above. CTA Neck:   1. Mild-moderate right vertebral artery origin stenosis. CT Brain Perfusion:   1. Elevated T-Max with smaller region of volume deficit in the right MCA   territory. Findings suggestive of tissue at ischemic risk with a smaller core   infarct. CT CODE NEURO HEAD WO CONTRAST   Final Result      Dense right middle cerebral artery. Small hypodensity right thalamus, age   indeterminate. The findings were called to Dr. Kamar Gomez on 5/17/2022 at [phone] by myself.      789. IR PERC ART MECH THROMB INFUSION INTRACRANIAL    (Results Pending)     CT Results  (Last 48 hours)               05/17/22 2357  CTA CODE NEURO HEAD AND NECK W CONT Preliminary result    Narrative:  *PRELIMINARY REPORT*       Acute occlusion distal M1/proximal M2 right middle cerebral artery. Mismatched   perfusion, 89 cc, right MCA territory. Preliminary report was provided by Dr. Elo Blair to Dr. Kamar Gomez, the on-call   radiologist, at [address] [place] [address],[address]       Final report to follow. *END PRELIMINARY REPORT*                               05/17/22 2357  CT CODE NEURO PERF W CBF Preliminary result    Narrative:  *PRELIMINARY REPORT*       Acute occlusion distal M1/proximal M2 right middle cerebral artery. Mismatched   perfusion, 89 cc, right MCA territory. Preliminary report was provided by Dr. Elo Blair to Dr. Kamar Gomez, the on-call   radiologist, at [address] [place] [address],[address]       Final report to follow. *END PRELIMINARY REPORT*                               05/17/22 2327  CT CODE NEURO HEAD WO CONTRAST Final result    Impression:      Dense right middle cerebral artery. Small hypodensity right thalamus, age   indeterminate. The findings were called to Dr. Tabby Guerrero on 5/17/2022 at [phone] by myself.       789. Narrative:  INDICATION: Code Stroke       EXAM:  HEAD CT WITHOUT CONTRAST       COMPARISON: None       TECHNIQUE:  Routine noncontrast axial head CT was performed. Sagittal and   coronal reconstructions were generated. CT dose reduction was achieved through use of a standardized protocol tailored   for this examination and automatic exposure control for dose modulation. FINDINGS:       Ventricles: Midline, no hydrocephalus. Intracranial Hemorrhage: None. Brain Parenchyma/Brainstem: Subtle hypodensity right thalamus. Dense right   middle cerebral artery. Basal Cisterns: Normal.   Paranasal Sinuses: Visualized sinuses are clear. Additional Comments: N/A. CXR Results  (Last 48 hours)               05/18/22 0913  XR CHEST PORT Final result    Impression:  No acute abnormality or change compared to the prior exam.           Narrative: Indication: Decreased oxygen saturation       Comparison to earlier the same day. Portable exam obtained at 015 demonstrates   little change in the lung fields compared to prior examination. No acute   infiltrate is identified. 05/18/22 0022  XR CHEST PORT Final result    Impression:  No acute process. Narrative:  INDICATION: ams       EXAM:  AP CHEST RADIOGRAPH       COMPARISON: None       FINDINGS:       AP portable view of the chest demonstrates normal heart size. Right subclavian   pacemaker. There is no edema, effusion, consolidation, or pneumothorax. The   osseous structures are unremarkable. Medical Decision Making   I am the first provider for this patient.     I reviewed the vital signs, available nursing notes, past medical history, past surgical history, family history and social history. Vital Signs-Reviewed the patient's vital signs. No data found. Pulse Oximetry Analysis - 97% on RA, normal    Cardiac Monitor:   Rate: 60 bpm  Rhythm: nsr    Records Reviewed/interpreted: Nursing Notes from initial triage and Old Medical Records, noting previous pulmonary HTN    Provider Notes (Medical Decision Making):     DDX:  ICH, Occlusive stroke, electrolyte abnormalities, arrhythmia    Plan:  Code S head ct, labs, ekg, Teleneuro consult    Impression:  MCA occlusion    ED Course:   Initial assessment performed. The patients presenting problems have been discussed, and they are in agreement with the care plan formulated and outlined with them. I have encouraged them to ask questions as they arise throughout their visit. I reviewed our electronic medical record system for any past medical records that were available that may contribute to the patients current condition, the nursing notes and and vital signs from today's visit    Nursing notes will be reviewed as they become available in realtime while the pt has been in the ED. Jhonathan Fowler MD    I personally reviewed pt's imaging. Official read by radiology noted above. Jhonathan Fowler MD      CONSULT NOTE:   11:22 PM   Jhonathan Fowler MD spoke with Dr. Jermaine Wharton,   Specialty: Cecilia Wharton due to complete L sided neglect//hemiparesis. Discussed pt's HPI and available diagnostic results thus far. Expressed concerns for needed evaluation. Consultant recommends mixing TPA, give labetalol and will evaluate pt via Teleneuro device and provide recommendations. Jhonathan Fowler MD      PROGRESS NOTE  Patient remains hypertensive while being evaluated by telemetry neuro physician.   Recommends holding tPA at that time also due to the fact that telemetry neuro spoke with patient's family members who note true last known well time to be 1730 today. Family reported that she was last seen normal at that time and subsequently took a nap. Got up to have dinner with them when they noted the onset of symptoms around 9 PM.  CTA pending. Contacted by Dr. Melvina Stallings of radiology who notes likely MCA occlusion. Will call back with CTA results when available. Have initiated lights and sirens transport of patient to [facility] ED for neuro intervention should MCA occlusion be confirmed by CTA. PROGRESS NOTE  0012  CTA with confirmation of large vessel occlusion. Have contacted neuro intervention, Dr. Dr. Saloni Mccauley who recommends emergent transfer to [facility]. Patient remains slightly hypertensive. PROGRESS NOTE:  12:19 AM   Pt's family made aware of significant findings on CTA for large vessel occlusion. Discussed conversations/consultations with telemetry neurology, and neurointervention with plan for transfer to [facility] for intervention. Patient family request that we do everything we can to assist her. Will initiate esmolol drip prior to patient's transport. Viloette Renteria MD          Critical Care Time:   CRITICAL CARE NOTE:  IMPENDING DETERIORATION -Airway, Respiratory, Cardiovascular, CNS, and Metabolic  ASSOCIATED RISK FACTORS - Hypotension, Bleeding, Dysrhythmia, Metabolic changes, Dehydration, Vascular Compromise, and CNS Decompensation  MANAGEMENT- Bedside Assessment and Supervision of Care, Tranfser  INTERPRETATION -  Xrays, CT Scan, ECG, and Blood Pressure  INTERVENTIONS - hemodynamic mngmt, vascular control, Neurologic interventions , and Metobolic interventions  CASE REVIEW - Hospitalist, Medical Sub-Specialist, Nursing, and Family  TREATMENT RESPONSE -Improved  PERFORMED BY - Self  NOTES   :  I have spent 90 minutes of critical care time involved in lab review, consultations with specialist, family decision- making, bedside attention and documentation excluding time spent on any separately billed procedures. During this entire length of time I was immediately available to the patient . Erick Ceron MD           Diagnosis     Clinical Impression:   1. Acute right MCA stroke (HCC)        PLAN:  TRANSFER TO [facility]    ADMISSION NOTE:  Patient is being admitted to the hospital by Dr. Kevin Rand. The results of their tests and reasons for their admission have been discussed with them and/or available family. They convey agreement and understanding for the need to be admitted and for their admission diagnosis.    Erick Ceron MD

## 2022-05-18 NOTE — CONSULTS
NEUROINTERVENTIONAL SURGERY CONSULT  Dion Porras NP      Date Time: 05/18/22 4:35 AM  Patient [address]  Attending Physician: Tonie Kim MD    REASON FOR CONSULTATION:   Occlusion distal M1/proximal M2 right middle cerebral artery    History of Present Illness: The patient is a 80-year-old female with past medical history of arthritis, diabetes, hypertension, hypothyroidism, and pacemaker placement who initially presented to [facility] as level 2 code stroke alert on 05/17/2022 around 10 PM via EMS with left-sided weakness and altered mental status. Upon arrival to the ED, patient's blood pressure was noted to be 200/98. The patient was evaluated by tele-neurologist Dr. Moo Bautista. CT head without contrast showed dense right middle cerebral artery. Small hypodensity right thalamus, age indeterminate patient was out of tPA window. CTA H/N showed acute occlusion distal M1/proximal M2 right middle cerebral artery. CTP with mismatched perfusion, 89 cc, right MCA territory that the patient was transferred to [facility] for an emergent mechanical thrombectomy of right MCA. Upon arrival to [facility], patient's NIH was 23. The patient was taken straight to angio for an emergent mechanical thrombectomy by Dr. Clarissa Taylor after obtaining informed consent from patient's daughter. Per report, the patient uses a walker, has visual issues from cataract, and alert/oriented from baseline. On 2L NC at baseline from recent pleural effusion about a month ago per daughter.      Past Medical History:     Past Medical History:   Diagnosis Date    Arthritis     Diabetes ([address]     Hard of hearing     Hypertension     Hypothyroidism     Poor historian        No Known Allergies    Social & Family History:     Social History     Socioeconomic History    Marital status:      Spouse name: Not on file    Number of children: Not on file    Years of education: Not on file   [facility] Highest education level: Not on file   Occupational History    Not on file   Tobacco Use    Smoking status: Former Smoker    Smokeless tobacco: Not on file   Substance and Sexual Activity    Alcohol use: No    Drug use: Not on file    Sexual activity: Not on file   Other Topics Concern    Not on file   Social History Narrative    Not on file     Social Determinants of Health     Financial Resource Strain:     Difficulty of Paying Living Expenses: Not on file   Food Insecurity:     Worried About Running Out of Food in the Last Year: Not on file    Felipa of Food in the Last Year: Not on file   Transportation Needs:     Lack of Transportation (Medical): Not on file    Lack of Transportation (Non-Medical): Not on file   Physical Activity:     Days of Exercise per Week: Not on file    Minutes of Exercise per Session: Not on file   Stress:     Feeling of Stress : Not on file   Social Connections:     Frequency of Communication with Friends and Family: Not on file    Frequency of Social Gatherings with Friends and Family: Not on file    Attends Gnosticism Services: Not on file    Active Member of [address] [place] [address] or Organizations: Not on file    Attends Club or Organization Meetings: Not on file    Marital Status: Not on file   Intimate Partner Violence:     Fear of Current or Ex-Partner: Not on file    Emotionally Abused: Not on file    Physically Abused: Not on file    Sexually Abused: Not on file   Housing Stability:     Unable to Pay for Housing in the Last Year: Not on file    Number of [place] in the Last Year: Not on file    Unstable Housing in the Last Year: Not on file     No family history on file.       Meds:     Current Facility-Administered Medications   Medication Dose Route Frequency    acetaminophen (TYLENOL) tablet 650 mg  650 mg Oral Q4H PRN    Or    acetaminophen (TYLENOL) solution 650 mg  650 mg Per NG tube Q4H PRN    Or    acetaminophen (TYLENOL) suppository 650 mg  650 mg Rectal Q4H PRN    heparin 4000 units/1000 mL (4 units/mL) in NS 4000 units/1000ml NS infusion **FOR ANGIO USE ONLY**        heparin (porcine) 1,000 unit/mL injection        verapamiL (ISOPTIN) 2.5 mg/mL injection        nitroglycerin 0.1 mg/mL injection        sodium chloride (NS) flush 5-40 mL  5-40 mL IntraVENous Q8H    sodium chloride (NS) flush 5-40 mL  5-40 mL IntraVENous PRN    atorvastatin (LIPITOR) tablet 40 mg  40 mg Oral DAILY    niCARdipine (CARDENE) 25 mg in 0.9% sodium chloride 250 mL infusion  0-15 mg/hr IntraVENous TITRATE    glucose chewable tablet 16 g  4 Tablet Oral PRN    dextrose 10 % infusion 0-250 mL  0-250 mL IntraVENous PRN    glucagon (GLUCAGEN) injection 1 mg  1 mg IntraMUSCular PRN    insulin lispro (HUMALOG) injection   SubCUTAneous Q6H    levothyroxine (SYNTHROID) tablet 75 mcg  75 mcg Oral ACB     I personally reviewed all of the medications    Review of Systems:   The patient is altered and too cognitively or communication impaired to participate in ROS. Physical Exam:   Blood pressure (!) 170/98, pulse 64, resp. rate 20, SpO2 100 %. GEN: NAD, Cooperative, Calm  HEENT: Normocephalic. Non-icter, no congestion; no teeth  Lungs:  CTA bilaterally Ant  Cardiac: S1,S2, normal rate and rhythm, no carotid bruits, no gallops  Abdomen: Normal bowel sounds, no distention, non-tender  Extremities: no clubbing, cyanosis, or edema  Skin: no rashes or lesions noted      NEURO:  Mental status: appears drowsy but will awake and respond to some commands and will at times say \"no\"  Cranial Nerves: Right gaze preference, PERRL, severe dysarthria, no aphasia. Noted some facial asymmetry. Hearing intact bilaterally. Tongue protrudes to midline, palate elevates symmetrically. Motor:  Normal bulk and tone, 5/5 strength right-sided extremities proximally and distally; no movement on left-sided. no involuntary movements.   Reflexes: +1 throughout, down going toes bilaterally   Sensation: Intact on right-sided extremities to Light touch and will only withdrawal to pain on the left upper extremity  gait:  Deferred       NIHSS  1a  Level of consciousness: 1=not alert but arousable by minor stimulation to obey, answer or respond   1b. LOC questions:  1=Answers one question correctly   1c. LOC commands: 1=Performs one task correctly   2. Best Gaze: 2=forced deviation, or total gaze paresis not overcome by oculocephalic maneuver   3. Visual: 0=No visual loss   4. Facial Palsy: 1=Minor paralysis (flattened nasolabial fold, asymmetric on smiling)   5a. Motor left arm: 4=No movement   5b. Motor right arm: 0=No drift, arm holds 90 (or 45) degrees for full 10 seconds   6a. Motor left leg: 3=No effort against gravity; leg falls to bed immediately   6b  Motor right le=No drift; leg holds 30-degree position for full 5 seconds. 7. Limb Ataxia: 0=Absent   8. Sensory: 2=Severe to total sensory loss; patient is not aware of being touched in face, arm, leg   9. Best Language:  0=No aphasia, normal   10. Dysarthria: 2=Severe; patient speech is so slurred as to be unintelligible in the absence of or out of proportion to any dysphagia, or is mute-anarthric   11. Extinction and Inattention: 2=Profound kaci-inattention or extinction to more than one modality; Does not recognize own hand or orients to only one side of space.     Total:    19     Labs/images:     Lab Results   Component Value Date/Time    WBC 7.3 2022 11:00 PM    Hemoglobin (POC) 10.9 (L) 2012 01:32 PM    HGB 10.9 (L) 2022 11:00 PM    Hematocrit (POC) 32 (L) 2012 01:32 PM    HCT 35.2 2022 11:00 PM    PLATELET 240  11:00 PM    MCV 91.9 2022 11:00 PM      Lab Results   Component Value Date/Time    Sodium 140 2022 11:00 PM    Potassium 3.4 (L) 2022 11:00 PM    Chloride 102 2022 11:00 PM    CO2 39 (H) 2022 11:00 PM    Anion gap NEG 1 2022 11:00 PM    Glucose 114 (H) 2022 11:00 PM    BUN 17 05/17/2022 11:00 PM    Creatinine 1.31 (H) 05/17/2022 11:00 PM    BUN/Creatinine ratio 13 05/17/2022 11:00 PM    GFR est AA 46 (L) 05/17/2022 11:00 PM    GFR est non-AA 38 (L) 05/17/2022 11:00 PM    Calcium 9.5 05/17/2022 11:00 PM    Bilirubin, total 0.5 05/17/2022 11:00 PM    Alk. phosphatase 103 05/17/2022 11:00 PM    Protein, total 7.7 05/17/2022 11:00 PM    Albumin 3.0 (L) 05/17/2022 11:00 PM    Globulin 4.7 (H) 05/17/2022 11:00 PM    A-G Ratio 0.6 (L) 05/17/2022 11:00 PM    ALT (SGPT) 20 05/17/2022 11:00 PM    AST (SGOT) 26 05/17/2022 11:00 PM       CT Results (most recent):  Results from [place] encounter on 05/17/22    CT CODE NEURO PERF W CBF    Narrative  *PRELIMINARY REPORT*    Acute occlusion distal M1/proximal M2 right middle cerebral artery. Mismatched  perfusion, 89 cc, right MCA territory. Preliminary report was provided by Dr. Zane Adler to Dr. Tabby Guerrero, the on-call  radiologist, at [address] [facility]    Final report to follow. *END PRELIMINARY REPORT*     Chart reviewed    Assessment & Plan:   59-year-old female with past medical history of arthritis, diabetes, hypertension, hypothyroidism, and pacemaker placement transferred to [facility] to [facility] for an emergent mechanical thrombectomy of distal M1/proximal M2 R MCA occlusion by Dr. Rambo Chavis. · Will admit to ICU post thrombectomy  · Obtained CT head status post thrombectomy  · Vessel imaging with CTA H/N already obtained; results reviewed   · Pending MRI brain without contrast (daughter will provide pacemaker information)  · As needed Cardene drip to keep systolic blood pressure goal 100-140  · Keep R radial/leg straight x4hr  · Every 1hour neurochecks  · Pending echocardiogram, lipid panel, TSH, hemoglobin A1c for tight glycemic control  · Started on Lipitor 40 mg daily pending lipid panel.  Will start ASA if no bleeding on repeat CTH post thrombectomy   · Stroke education  · DVT ppx  · NPO  · Maintenance IVF LR 50ml/hr  · Neurology consult   · PT/OT/ST evals  · Low threshold to repeat CT head if any worsening change in neurologic condition    Case discussed with:  Dr. Logan Silveira, ED physician, ED nurses, patient's daughters and son, intensivist, angio nurses, and ICU primary nurse    >55% time spent in counseling or coordination of care of the above in the assessment and plan     Signed by: Desiree Dee NP      Please note that this dictation was completed with [employer], the computer voice recognition software. Quite often unanticipated grammatical, syntax, homophones, and other interpretive errors are inadvertently transcribed by the computer software. Please disregard these errors. Please excuse any errors that have escaped final proofreading.

## 2022-05-18 NOTE — ED NOTES
Family at bedside, states she got up to go to bathroom around 9pm and began with \"lt sided weakness\" . Denies fall states states they sat her down to keep her from falling.  Dr. Analia Wheeler notified and back to bedside to assess pt

## 2022-05-18 NOTE — CONSULTS
Palliative medicine    Consult received, pt just had thrombectomy earlier today so will see how she does today medically and with therapies  and f/u with family tomorrow.

## 2022-05-18 NOTE — CONSULTS
[address]    Name:  Shakila Diaz  MR#:  [id number]  :  1928  ACCOUNT #:  [de-identified]  DATE OF SERVICE:  2022    REQUESTING PHYSICIAN:  Dr. Phillip Cuello. REASON FOR EVALUATION:  Stroke. HISTORY OF PRESENT ILLNESS:  The patient is a 77-year-old female with history of diabetes, hypertension, pacemaker implantation who was initially brought into [facility] after she was found to have altered mental status and not moving her left side well. CT brain and CTA of the head and neck were completed which showed acute occlusion of the distal right middle cerebral artery. She was transferred over to [facility] for higher level of care, and emergent mechanical thrombectomy was performed by Dr. Brandon Jarvis earlier this morning. Postprocedure, she was admitted to the ICU. She is alert, partially oriented, spontaneously moving all extremities, less so on the left side. Detailed review of systems was difficult due to the patient's mental state. PAST MEDICAL HISTORY:  As mentioned above. SOCIAL HISTORY:  The patient is . No history of smoking or alcohol use. CURRENT MEDICATIONS:  1.  Verapamil. 2.  Insulin. 3.  Atorvastatin. 4.  Levothyroxine. 5.  Cardene drip as needed. PHYSICAL EXAMINATION:  GENERAL:  On examination, the patient is lying in bed, in no acute distress. She seems to be spontaneously moving her extremities, more so on the left. NEUROLOGIC:  Pupils are equal and reactive on both sides. Extraocular movements are intact. She has conjunctival congestion on the right. Face appears symmetric. She was not able to recognize objects or tell me how many fingers she was seeing and answering five to all my gestures. She was able to squeeze with her right hand when fingers were placed in her palm. Was able to hold her right arm and right leg in air when passively raised.   Mild rigidity noted in all extremities, more so on the left. No tremors. Tries to withdraw her left leg to Babinski testing. DTRs 2/2 and symmetric. Toes are downgoing. Sensory exam is unreliable. Gait exam is deferred. HEART:  Regular rate and rhythm. CHEST:  Clear. ABDOMEN:  Soft, nontender. Positive bowel sounds. EXTREMITIES:  No edema. LABORATORY DATA:  CBC is unremarkable. Chemistry, sodium 139, potassium 3.7, BUN 15, creatinine 1.04. Lipid profile, triglycerides 71, HDL 44, .8. Hemoglobin A1c is 5.8. DIAGNOSTIC DATA:  CTA of the head and neck was unremarkable except for an acute occlusion of the distal right M1 and M2 branches. CT perfusion showed area of perfusion mismatch in the right MCA territory. Echocardiogram showed atrial paced rhythm. ASSESSMENT AND PLAN:  A 80year-old female with diabetes and hypertension, presenting initially to [facility] with altered mental status and not moving her left side. Her CTA of the head and neck showed occlusion of the distal right middle cerebral artery, and she is status post mechanical thrombectomy. Suspect atherothrombotic occlusion or cardioembolism given the fact that there is no other significant atherosclerotic vascular disease seen intracranially or extracranially. Clinically, she is alert and oriented to self/person only. Does not follow commands and does not talk meaningfully. She spontaneously moves her right side and neglect noted on the left. Suspect area of moderate-sized infarction in the right middle cerebral artery territory. Continue with post thrombectomy care. Repeat CT scan as scheduled per protocol and if negative, start aspirin 325 mg daily. She has already been started on Lipitor. Follow up on echocardiogram.  PT/OT/ST evaluations. Maintain systolic blood pressure goal between 100-140. We will follow. Thank you for this consultation.       MD TMI [id number]  D:  05/18/2022 11:23  T:  05/18/2022 13:13  JOB #:  [id number]

## 2022-05-18 NOTE — CONSULTS
Palliative Medicine Consult  Akil: [phone] ([phone])    Patient Name: Arti Hall  YOB: 1928    Date of Initial Consult: 5/18/22  Reason for Consult: Care decisions   Requesting Provider: Elizabeth Alcocer   Primary Care Physician: Cornel Jurado MD     SUMMARY:   Arti Hall is a 80 y.o. R hand dominant female with a past history of pulmonary HTN, pacemaker, DM, chronic lumbar stenosis s/p laminectomy who normally is seen at [facility] who was admitted on 5/18/2022 from [address] after presenting at home w/ acute onset of L sided weakness and confusion. Found to have acute R MCA stroke out of window for tPA, s/p emergent thrombectomy. Doing well, tolerating pureed diet/thin liquids and states she is motivated to work w/ therapies. Repeat Head CT 5/19 stable, started on ASA. Current medical issues leading to Palliative Medicine involvement include: care decisions. Social: At baseline alert/oriented fully, uses 2L O2 and a walker. - she has 6 children and lives w/ dtr Rodrigues April. Can do all ADLs, did not cook or drive. PALLIATIVE DIAGNOSES:   1. L sided weakness   2. Confusion, improved   3. Goals of care       PLAN:   1. Along w/ Buster Weir LCSW meet w/ pt who is awake, alert, very pleasant and feeding herself breakfast. In great spirits. Is confused about situation and mental status has wax/waned. 2. Speak w/ dtr Rodrigues April- she helps pt make medical decisions, there may be an AMD at PCP office. Pt can make many decisions on her own. There are 6 children, all communicate. Pt has made significant gains since thrombectomy, will need rehab likely at time of discharge. Her family is aware we need to see what her new baseline will be but pleased of progress. Will see if can obtain AMD.  3. Goals are clear for rehab, confirm full code status although given her severe pulmonary HTN (noted in [facility] notes) encourage ongoing conversations w/ her PCP and/or cardiologist.   4. Following for support. 5. Initial consult note routed to primary continuity provider and/or primary health care team members  6. Communicated plan of care with: Palliative IDT, [facility] Team incl Deisy RN, Elicia Gonzales SLP     GOALS OF CARE / TREATMENT PREFERENCES:     GOALS OF CARE:  Patient/Health Care Proxy Stated Goals: Rehabilitation    TREATMENT PREFERENCES:   Code Status: Full Code    Patient and family's personal goals include:to get home      Advance Care Planning:  [x] [facility] Interdisciplinary Team has updated the ACP Navigator with [address] and Patient Capacity      Primary Decision Maker: Chanell Cross - Child - [phone]    No flowsheet data found. Medical Interventions: Full interventions       Other:    As far as possible, the palliative care team has discussed with patient / health care proxy about goals of care / treatment preferences for patient. HISTORY:     History obtained from: pt, chart, staff, family     CHIEF COMPLAINT: \"Oh boy! \"    HPI/SUBJECTIVE:    The patient is:   [x] Verbal and participatory  [] Non-participatory due to: Pt awake, pleasant, and in great spirits. Tolerating puree diet well.      Clinical Pain Assessment (nonverbal scale for severity on nonverbal patients):   Clinical Pain Assessment  Severity: 0          Duration: for how long has pt been experiencing pain (e.g., 2 days, 1 month, years)  Frequency: how often pain is an issue (e.g., several times per day, once every few days, constant)     FUNCTIONAL ASSESSMENT:     Palliative Performance Scale (PPS):  PPS: 60       PSYCHOSOCIAL/SPIRITUAL SCREENING:     Palliative IDT has assessed this patient for cultural preferences / practices and a referral made as appropriate to needs (Cultural Services, Patient Advocacy, Ethics, etc.)    Any spiritual / Druze concerns:  [] Yes /  [x] No   If \"Yes\" to discuss with pastoral care during IDT     Does caregiver feel burdened by caring for their loved one: [] Yes /  [x] No /  [] No Caregiver Present    If \"Yes\" to discuss with social work during IDT    Anticipatory grief assessment:   [x] Normal  / [] Maladaptive     If \"Maladaptive\" to discuss with social work during IDT    ESAS Anxiety: Anxiety: 0    ESAS Depression: Depression: 0        REVIEW OF SYSTEMS:     Positive and pertinent negative findings in ROS are noted above in HPI. The following systems were [x] reviewed / [] unable to be reviewed as noted in HPI  Other findings are noted below. Systems: constitutional, ears/nose/mouth/throat, respiratory, gastrointestinal, genitourinary, musculoskeletal, integumentary, neurologic, psychiatric, endocrine. Positive findings noted below. Modified ESAS Completed by: provider   Fatigue: 5 Drowsiness: 0   Depression: 0 Pain: 0   Anxiety: 0 Nausea: 0   Anorexia: 0 Dyspnea: 0           Stool Occurrence(s): 0        PHYSICAL EXAM:     From RN flowsheet:  Wt Readings from Last 3 Encounters:   05/19/22 182 lb 1.6 oz (82.6 kg)   05/18/22 187 lb 13.3 oz (85.2 kg)   03/15/12 174 lb (78.9 kg)     Blood pressure 138/63, pulse 62, temperature 97.9 °F (36.6 °C), resp. rate 14, weight 182 lb 1.6 oz (82.6 kg), SpO2 99 %. Pain Scale 1: Numeric (0 - 10)  Pain Intensity 1: 0                 Last bowel movement, if known:     Constitutional: awake, alert, oriented, NAD  Eyes: pupils equal, anicteric  ENMT: no nasal discharge, moist mucous membranes  Respiratory: breathing not labored  Skin: warm, dry  Neurologic: following commands, moving all extremities  Psychiatric: full affect, no hallucinations       HISTORY:     Active Problems:    Stroke (cerebrum) ([facility] [address] (5/18/2022)      Past Medical History:   Diagnosis Date    Arthritis     Diabetes ([address]     Hard of hearing     Hypertension     Hypothyroidism     Poor historian       Past Surgical History:   Procedure Laterality Date    HX TUBAL LIGATION        History reviewed. No pertinent family history.    History reviewed, no pertinent family history.   Social History     Tobacco Use    Smoking status: Former Smoker    Smokeless tobacco: Not on file   Substance Use Topics    Alcohol use: No     No Known Allergies   Current Facility-Administered Medications   Medication Dose Route Frequency    losartan (COZAAR) tablet 100 mg  100 mg Oral DAILY    atorvastatin (LIPITOR) tablet 80 mg  80 mg Oral DAILY    aspirin tablet 325 mg  325 mg Oral DAILY    phosphorus (K PHOS NEUTRAL) 250 mg tablet 2 Tablet  2 Tablet Oral BID    potassium chloride SR (KLOR-CON 10) tablet 20 mEq  20 mEq Oral NOW    heparin (porcine) injection 5,000 Units  5,000 Units SubCUTAneous Q8H    acetaminophen (TYLENOL) tablet 650 mg  650 mg Oral Q4H PRN    Or    acetaminophen (TYLENOL) solution 650 mg  650 mg Per NG tube Q4H PRN    Or    acetaminophen (TYLENOL) suppository 650 mg  650 mg Rectal Q4H PRN    sodium chloride (NS) flush 5-40 mL  5-40 mL IntraVENous Q8H    sodium chloride (NS) flush 5-40 mL  5-40 mL IntraVENous PRN    glucose chewable tablet 16 g  4 Tablet Oral PRN    dextrose 10 % infusion 0-250 mL  0-250 mL IntraVENous PRN    glucagon (GLUCAGEN) injection 1 mg  1 mg IntraMUSCular PRN    insulin lispro (HUMALOG) injection   SubCUTAneous Q6H    levothyroxine (SYNTHROID) tablet 112 mcg  112 mcg Oral ACB    brimonidine (ALPHAGAN) 0.2 % ophthalmic solution 1 Drop  1 Drop Both Eyes BID    And    timolol (TIMOPTIC) 0.5 % ophthalmic solution 1 Drop  1 Drop Both Eyes BID          LAB AND IMAGING FINDINGS:     Lab Results   Component Value Date/Time    WBC 7.6 05/19/2022 02:46 AM    HGB 10.7 (L) 05/19/2022 02:46 AM    PLATELET 247 09/61/9888 02:46 AM     Lab Results   Component Value Date/Time    Sodium 141 05/19/2022 02:46 AM    Potassium 3.5 05/19/2022 02:46 AM    Chloride 103 05/19/2022 02:46 AM    CO2 37 (H) 05/19/2022 02:46 AM    BUN 7 05/19/2022 02:46 AM    Creatinine 0.72 05/19/2022 02:46 AM    Calcium 9.0 05/19/2022 02:46 AM    Magnesium 1.8 05/19/2022 02:46 AM    Phosphorus 2.1 (L) 05/19/2022 02:46 AM      Lab Results   Component Value Date/Time    Alk. phosphatase 86 05/19/2022 02:46 AM    Protein, total 7.1 05/19/2022 02:46 AM    Albumin 2.7 (L) 05/19/2022 02:46 AM    Globulin 4.4 (H) 05/19/2022 02:46 AM     Lab Results   Component Value Date/Time    INR 1.1 05/17/2022 11:00 PM    Prothrombin time 11.2 (H) 05/17/2022 11:00 PM      No results found for: IRON, FE, TIBC, IBCT, PSAT, FERR   No results found for: PH, PCO2, PO2  No components found for: GLPOC   No results found for: CPK, CKMB             Total time:   Counseling / coordination time, spent as noted above:   > 50% counseling / coordination?:     Prolonged service was provided for  []30 min   []75 min in face to face time in the presence of the patient, spent as noted above. Time Start:   Time End:   Note: this can only be billed with 95861 (initial) or 71239 (follow up). If multiple start / stop times, list each separately.

## 2022-05-18 NOTE — PROGRESS NOTES
Neurocritical Care Brief Progress Note:    66-year-old female who presented to the ER at [facility] 22 evening with left-sided weakness. She is s/p right MCA thrombectomy by Dr. Vanna Campos overnight. Physical Exam:  Gen: NAD, calm, cooperative  Neuro: A&O to self. Follows commands. Speech clear. Affect normal. PERRL, 3 mm bilaterally. Blinks to threat. No disconjugate gaze present. EOMI. Face symmetric. Kathi spontaneously. Strength 5/5 in UE and LE on right side. Left arm and leg weakness. . Bulk and tone normal. No involuntary movements. Gait deferred. Skin: Warm, dry, color appropriate for ethnicity. Right groin arteriotomy site soft and non-tender on palpation, dressing is C/D/I, with no active bleeding or drainage noted. Positive pedal pulses bilaterally. NIHSS:      1a-LOC:0    1b-Month/Age:2    1c-Open/Close Hand:0    2-Best Gaze:0    3-Visual Fields:0    4-Facial Palsy:0    5a-Left Arm:2    5b-Right Arm:0    6a-Left Le    6b-Right Le    7-Limb Ataxia:0    8-Sensory:1    9-Best Language:0    10-Dysarthria:0    11-Extinction/Inattention:1  TOTAL SCORE:8    Continue current plan per NIS. Not sure she can have MRI due to pacemaker.  If cannot have MRI please repeat [address] in am.     Elsie Lou NP  Neurocritical Care Nurse Practitioner  [phone]

## 2022-05-18 NOTE — PROGRESS NOTES
Problem: Mobility Impaired (Adult and Pediatric)  Goal: *Acute Goals and Plan of Care (Insert Text)  Description:   FUNCTIONAL STATUS PRIOR TO ADMISSION: Patient was modified independent using a rolling walker for functional mobility. HOME SUPPORT PRIOR TO ADMISSION: The patient lived alone with family to provide assistance if/as needed per  notes. Physical Therapy Goals  Initiated 5/18/2022  1. Patient will move from supine to sit and sit to supine  and scoot up and down in bed with moderate assistance  within 7 day(s). 2.  Patient will transfer from bed to chair and chair to bed with maximal assistance using the least restrictive device within 7 day(s). 3.  Patient will perform sit to stand with maximal assistance within 7 day(s). 4.  Patient will demo fair sitting balance at EOB [id number] in prep for mobility progression within 7 days. 5.  Patient will improve Santana Balance score by 7 points within 7 days. Outcome: Not Met     PHYSICAL THERAPY EVALUATION- NEURO POPULATION  Patient: Saniya Ricketts (89 y.o. female)  Date: 5/18/2022  Primary Diagnosis: Stroke (cerebrum) ([facility] [address] [I63.9]        Precautions:          ASSESSMENT  Based on the objective data described below, the patient presents with significantly impaired functional mobility as compared to reported baseline level 2* decreased command following, ? aphasia, L inattention to self and environment, impaired balance, L weakness and increased tone, and general decreased tolerance to activity following admission for acute R MCA LVO, now s/p mechanical thrombectomy. Pt is A&O x1 and unable to provide PLOF information, however per chart, she lived alone and was mod I using a RW. Received pt supine in bed, initially very drowsy but alertness improved some with lights on and increased stimulation. Required total A x2 for rolling L<> R for pericare/linen change with [place] facilitation and increased time for processing.  Completed supine<>sit x2 reps with total A x2 - demos strong extensor thrust once up and actively resisted against attempts to fwd weight shift/hinge at hips. Spontaneously moves L UE/LE but not to command. Assisted back to bed and repositioned in upright, NAD. At this time, she is far below her baseline level and a high falls risk - anticipating extensive rehab needs. Recommend discharge to SNF level rehab. Current Level of Function Impacting Discharge (mobility/balance): total A x2; L inattention; L weakness     Functional Outcome Measure: The patient scored Total: 0/56 on the Santana Balance Assessment which is indicative of high fall risk. Other factors to consider for discharge: below baseline; high falls risk     Patient will benefit from skilled therapy intervention to address the above noted impairments. PLAN :  Recommendations and Planned Interventions: bed mobility training, transfer training, gait training, therapeutic exercises, neuromuscular re-education, patient and family training/education, and therapeutic activities      Frequency/Duration: Patient will be followed by physical therapy:  5 times a week to address goals. Recommendation for discharge: (in order for the patient to meet his/her long term goals)  Therapy up to 5 days/week in SNF setting pending progress    This discharge recommendation:  Has been made in collaboration with the attending provider and/or case management    IF patient discharges home will need the following DME: to be determined (TBD)         SUBJECTIVE:   Patient stated Tamela.     OBJECTIVE DATA SUMMARY:   HISTORY:    Past Medical History:   Diagnosis Date    Arthritis     Diabetes ([address]     Hard of hearing     Hypertension     Hypothyroidism     Poor historian      Past Surgical History:   Procedure Laterality Date    HX TUBAL LIGATION         Personal factors and/or comorbidities impacting plan of care: PMHx    Home Situation  Home Environment: Private residence  # Steps to Enter: 3  One/Two Story Residence: One story  Living Alone: Yes  Support Systems: Child(piotr)  Current DME Used/Available at Home: Walker, rolling    EXAMINATION/PRESENTATION/DECISION MAKING:   Critical Behavior:  Neurologic State: Alert  Orientation Level: Oriented to person  Cognition: Follows commands  Safety/Judgement: Decreased awareness of environment,Decreased awareness of need for assistance,Decreased awareness of need for safety,Decreased insight into deficits  Hearing:     Skin:    Edema:   Range Of Motion:  AROM: Grossly decreased, non-functional    Strength:    Strength: Grossly decreased, non-functional     Tone & Sensation:   Tone: Abnormal         Coordination:  Coordination: Grossly decreased, non-functional  Vision:   Tracking:  (unable to follow command to complete)  Diplopia:  (answers \"five\" for each attempt)  Functional Mobility:  Bed Mobility:  Rolling: Assist x2;Total assistance  Supine to Sit: Assist x2;Total assistance  Sit to Supine: Assist x2;Total assistance  Scooting: Assist x2;Total assistance  Transfers:    Balance:   Sitting: Impaired; With support  Sitting - Static: Poor (constant support)    Functional Measure  Santana Balance Test:    Sitting to Standin  Standing Unsupported: 0  Sitting with Back Unsupported: 0  Standing to Sittin  Transfers: 0  Standing Unsupported with Eyes Closed: 0  Standing Unsupported with Feet Together: 0  Reach Forward with Outstretched Arm: 0   Object: 0  Turn to Look Over Shoulders: 0  Turn 360 Degrees: 0  Alternate Foot on Step/Stool: 0  Standing Unsupported One Foot in Front: 0  Stand on One Le  Total: 0/56         56=Maximum possible score;   0-20=High fall risk  21-40=Moderate fall risk   41-56=Low fall risk        Physical Therapy Evaluation Charge Determination   History Examination Presentation Decision-Making   HIGH Complexity :3+ comorbidities / personal factors will impact the outcome/ POC  HIGH Complexity : 4+ Standardized tests and measures addressing body structure, function, activity limitation and / or participation in recreation  LOW Complexity : Stable, uncomplicated  HIGH Complexity : FOTO score of 1- 25       Based on the above components, the patient evaluation is determined to be of the following complexity level: LOW       Activity Tolerance:   Fair      After treatment patient left in no apparent distress:   Supine in bed, Call bell within reach, Caregiver / family present, and Side rails x 3    COMMUNICATION/EDUCATION:   The patients plan of care was discussed with: Occupational therapist, Registered nurse, and Physician. Patient was educated regarding her deficit(s) of L inattention as this relates to her diagnosis of CVA. She demonstrated Guarded understanding as evidenced by no acknowledgement. Patient and/or family was verbally educated on the BE FAST acronym for signs/symptoms of CVA and TIA. BE FAST was written on patient's communication board  for visual education and reinforcement. All questions answered with patient indicating poor understanding. Fall prevention education was provided and the patient/caregiver indicated understanding., Patient/family have participated as able in goal setting and plan of care. , and Patient/family agree to work toward stated goals and plan of care.     Thank you for this referral.  Elena Dumont, PT, DPT   Time Calculation: 19 mins

## 2022-05-19 ENCOUNTER — APPOINTMENT (OUTPATIENT)
Dept: CT IMAGING | Age: 87
DRG: 024 | End: 2022-05-19
Attending: ANESTHESIOLOGY
Payer: MEDICARE

## 2022-05-19 PROBLEM — R94.01 ABNORMAL EEG: Status: ACTIVE | Noted: 2022-05-19

## 2022-05-19 LAB
ALBUMIN SERPL-MCNC: 2.7 G/DL (ref 3.5–5)
ALBUMIN/GLOB SERPL: 0.6 {RATIO} (ref 1.1–2.2)
ALP SERPL-CCNC: 86 U/L (ref 45–117)
ALT SERPL-CCNC: 15 U/L (ref 12–78)
ANION GAP SERPL CALC-SCNC: 1 MMOL/L (ref 5–15)
AST SERPL-CCNC: 23 U/L (ref 15–37)
BILIRUB SERPL-MCNC: 0.7 MG/DL (ref 0.2–1)
BUN SERPL-MCNC: 7 MG/DL (ref 6–20)
BUN/CREAT SERPL: 10 (ref 12–20)
CALCIUM SERPL-MCNC: 9 MG/DL (ref 8.5–10.1)
CHLORIDE SERPL-SCNC: 103 MMOL/L (ref 97–108)
CO2 SERPL-SCNC: 37 MMOL/L (ref 21–32)
CREAT SERPL-MCNC: 0.72 MG/DL (ref 0.55–1.02)
ERYTHROCYTE [DISTWIDTH] IN BLOOD BY AUTOMATED COUNT: 13.4 % (ref 11.5–14.5)
GLOBULIN SER CALC-MCNC: 4.4 G/DL (ref 2–4)
GLUCOSE BLD STRIP.AUTO-MCNC: 100 MG/DL (ref 65–117)
GLUCOSE BLD STRIP.AUTO-MCNC: 100 MG/DL (ref 65–117)
GLUCOSE BLD STRIP.AUTO-MCNC: 99 MG/DL (ref 65–117)
GLUCOSE SERPL-MCNC: 89 MG/DL (ref 65–100)
HCT VFR BLD AUTO: 34.7 % (ref 35–47)
HGB BLD-MCNC: 10.7 G/DL (ref 11.5–16)
MAGNESIUM SERPL-MCNC: 1.8 MG/DL (ref 1.6–2.4)
MCH RBC QN AUTO: 28.5 PG (ref 26–34)
MCHC RBC AUTO-ENTMCNC: 30.8 G/DL (ref 30–36.5)
MCV RBC AUTO: 92.3 FL (ref 80–99)
NRBC # BLD: 0 K/UL (ref 0–0.01)
NRBC BLD-RTO: 0 PER 100 WBC
PHOSPHATE SERPL-MCNC: 2.1 MG/DL (ref 2.6–4.7)
PLATELET # BLD AUTO: 169 K/UL (ref 150–400)
PMV BLD AUTO: 9.9 FL (ref 8.9–12.9)
POTASSIUM SERPL-SCNC: 3.5 MMOL/L (ref 3.5–5.1)
PROT SERPL-MCNC: 7.1 G/DL (ref 6.4–8.2)
RBC # BLD AUTO: 3.76 M/UL (ref 3.8–5.2)
SERVICE CMNT-IMP: NORMAL
SODIUM SERPL-SCNC: 141 MMOL/L (ref 136–145)
WBC # BLD AUTO: 7.6 K/UL (ref 3.6–11)

## 2022-05-19 PROCEDURE — 83735 ASSAY OF MAGNESIUM: CPT

## 2022-05-19 PROCEDURE — 74011000250 HC RX REV CODE- 250: Performed by: NURSE PRACTITIONER

## 2022-05-19 PROCEDURE — 99233 SBSQ HOSP IP/OBS HIGH 50: CPT | Performed by: PSYCHIATRY & NEUROLOGY

## 2022-05-19 PROCEDURE — 74011250637 HC RX REV CODE- 250/637: Performed by: NURSE PRACTITIONER

## 2022-05-19 PROCEDURE — 80053 COMPREHEN METABOLIC PANEL: CPT

## 2022-05-19 PROCEDURE — 85027 COMPLETE CBC AUTOMATED: CPT

## 2022-05-19 PROCEDURE — 84100 ASSAY OF PHOSPHORUS: CPT

## 2022-05-19 PROCEDURE — 65660000001 HC RM ICU INTERMED STEPDOWN

## 2022-05-19 PROCEDURE — 36415 COLL VENOUS BLD VENIPUNCTURE: CPT

## 2022-05-19 PROCEDURE — 74011250637 HC RX REV CODE- 250/637: Performed by: INTERNAL MEDICINE

## 2022-05-19 PROCEDURE — 74011250636 HC RX REV CODE- 250/636: Performed by: INTERNAL MEDICINE

## 2022-05-19 PROCEDURE — 97530 THERAPEUTIC ACTIVITIES: CPT

## 2022-05-19 PROCEDURE — 82962 GLUCOSE BLOOD TEST: CPT

## 2022-05-19 PROCEDURE — 74011250636 HC RX REV CODE- 250/636: Performed by: NURSE PRACTITIONER

## 2022-05-19 PROCEDURE — 92526 ORAL FUNCTION THERAPY: CPT

## 2022-05-19 PROCEDURE — 99223 1ST HOSP IP/OBS HIGH 75: CPT | Performed by: PHYSICAL MEDICINE & REHABILITATION

## 2022-05-19 PROCEDURE — 70450 CT HEAD/BRAIN W/O DYE: CPT

## 2022-05-19 PROCEDURE — 97535 SELF CARE MNGMENT TRAINING: CPT

## 2022-05-19 PROCEDURE — 74011250637 HC RX REV CODE- 250/637: Performed by: PSYCHIATRY & NEUROLOGY

## 2022-05-19 PROCEDURE — 95816 EEG AWAKE AND DROWSY: CPT | Performed by: PSYCHIATRY & NEUROLOGY

## 2022-05-19 RX ORDER — POTASSIUM CHLORIDE 750 MG/1
20 TABLET, FILM COATED, EXTENDED RELEASE ORAL
Status: COMPLETED | OUTPATIENT
Start: 2022-05-19 | End: 2022-05-19

## 2022-05-19 RX ORDER — LEVETIRACETAM 250 MG/1
250 TABLET ORAL 2 TIMES DAILY
Status: DISCONTINUED | OUTPATIENT
Start: 2022-05-19 | End: 2022-05-24 | Stop reason: HOSPADM

## 2022-05-19 RX ORDER — LOSARTAN POTASSIUM 50 MG/1
100 TABLET ORAL DAILY
Status: DISCONTINUED | OUTPATIENT
Start: 2022-05-19 | End: 2022-05-24 | Stop reason: HOSPADM

## 2022-05-19 RX ORDER — GUAIFENESIN 100 MG/5ML
81 LIQUID (ML) ORAL DAILY
Status: DISCONTINUED | OUTPATIENT
Start: 2022-05-19 | End: 2022-05-19

## 2022-05-19 RX ORDER — HEPARIN SODIUM 5000 [USP'U]/ML
5000 INJECTION, SOLUTION INTRAVENOUS; SUBCUTANEOUS EVERY 8 HOURS
Status: DISCONTINUED | OUTPATIENT
Start: 2022-05-19 | End: 2022-05-24 | Stop reason: HOSPADM

## 2022-05-19 RX ORDER — ATORVASTATIN CALCIUM 40 MG/1
80 TABLET, FILM COATED ORAL DAILY
Status: DISCONTINUED | OUTPATIENT
Start: 2022-05-19 | End: 2022-05-24 | Stop reason: HOSPADM

## 2022-05-19 RX ORDER — ASPIRIN 325 MG
325 TABLET ORAL DAILY
Status: DISCONTINUED | OUTPATIENT
Start: 2022-05-19 | End: 2022-05-24 | Stop reason: HOSPADM

## 2022-05-19 RX ORDER — INSULIN LISPRO 100 [IU]/ML
INJECTION, SOLUTION INTRAVENOUS; SUBCUTANEOUS
Status: DISCONTINUED | OUTPATIENT
Start: 2022-05-19 | End: 2022-05-24 | Stop reason: HOSPADM

## 2022-05-19 RX ADMIN — POTASSIUM CHLORIDE 20 MEQ: 750 TABLET, EXTENDED RELEASE ORAL at 11:24

## 2022-05-19 RX ADMIN — BRIMONIDINE TARTRATE 1 DROP: 2 SOLUTION/ DROPS OPHTHALMIC at 17:56

## 2022-05-19 RX ADMIN — BRIMONIDINE TARTRATE 1 DROP: 2 SOLUTION/ DROPS OPHTHALMIC at 09:03

## 2022-05-19 RX ADMIN — SODIUM CHLORIDE, PRESERVATIVE FREE 10 ML: 5 INJECTION INTRAVENOUS at 14:00

## 2022-05-19 RX ADMIN — ATORVASTATIN CALCIUM 80 MG: 40 TABLET, FILM COATED ORAL at 09:02

## 2022-05-19 RX ADMIN — LOSARTAN POTASSIUM 100 MG: 50 TABLET, FILM COATED ORAL at 09:02

## 2022-05-19 RX ADMIN — SODIUM CHLORIDE, PRESERVATIVE FREE 10 ML: 5 INJECTION INTRAVENOUS at 05:27

## 2022-05-19 RX ADMIN — DIBASIC SODIUM PHOSPHATE, MONOBASIC POTASSIUM PHOSPHATE AND MONOBASIC SODIUM PHOSPHATE 2 TABLET: 852; 155; 130 TABLET ORAL at 17:54

## 2022-05-19 RX ADMIN — DIBASIC SODIUM PHOSPHATE, MONOBASIC POTASSIUM PHOSPHATE AND MONOBASIC SODIUM PHOSPHATE 2 TABLET: 852; 155; 130 TABLET ORAL at 09:02

## 2022-05-19 RX ADMIN — SODIUM CHLORIDE, POTASSIUM CHLORIDE, SODIUM LACTATE AND CALCIUM CHLORIDE 50 ML/HR: 600; 310; 30; 20 INJECTION, SOLUTION INTRAVENOUS at 00:13

## 2022-05-19 RX ADMIN — TIMOLOL MALEATE 1 DROP: 5 SOLUTION OPHTHALMIC at 17:56

## 2022-05-19 RX ADMIN — LEVETIRACETAM 250 MG: 250 TABLET, FILM COATED ORAL at 17:54

## 2022-05-19 RX ADMIN — TIMOLOL MALEATE 1 DROP: 5 SOLUTION OPHTHALMIC at 09:03

## 2022-05-19 RX ADMIN — HEPARIN SODIUM 5000 UNITS: 5000 INJECTION INTRAVENOUS; SUBCUTANEOUS at 11:24

## 2022-05-19 RX ADMIN — SODIUM CHLORIDE 2.5 MG/HR: 9 INJECTION, SOLUTION INTRAVENOUS at 05:28

## 2022-05-19 RX ADMIN — LEVOTHYROXINE SODIUM 112 MCG: 0.11 TABLET ORAL at 05:33

## 2022-05-19 RX ADMIN — ASPIRIN 325 MG ORAL TABLET 325 MG: 325 PILL ORAL at 09:02

## 2022-05-19 NOTE — PROGRESS NOTES
2000 Received report from [facility] and assumed care. 0000 Complete chlorhexidine bed bath provided, tolerated well. 0230 Transported to CT with nurse and PCT on monitor. 0300 Labs drawn. 0730 Bedside and Verbal shift change report given to Deisy (oncoming nurse) by [facility] (offgoing nurse). Report included the following information SBAR, Kardex, Intake/Output, MAR, Recent Results and Alarm Parameters .

## 2022-05-19 NOTE — PROGRESS NOTES
Kaleb Coronel is a 80 y.o. female with diabetes and hypertension, presenting initially to [facility] with altered mental status and not moving her left side. Her CTA of the head and neck showed occlusion of the distal right middle cerebral artery, and she is status post mechanical thrombectomy. Suspect atherothrombotic occlusion or cardioembolism given the fact that there is no other significant atherosclerotic vascular disease seen intracranially or extracranially.     Clinically, she is much more alert and oriented to, self, place and person today. Follows commands. Seems to be moving all extremities but has slight weakness in the left upper and lower extremity compared to the right. EXAM  Exam:  Visit Vitals  BP (!) (P) 104/39   Pulse (P) 60   Temp (P) 98.4 °F (36.9 °C)   Resp (P) 18   Wt 182 lb 1.6 oz (82.6 kg)   SpO2 (P) 97%   BMI 29.39 kg/m²     Gen: Awake  CV: RRR  Lungs: non labored breathing  Abd: non distending  Neuro: Answers simple questions and follows commands. Slightly confused as she thought that her daughter and son-in-law were her father and mother.     CN II-XII: PERRL, EOMI, face symmetric, tongue/palate midline  Motor: strength 5/5 on the right side, 4+/5 in the left upper and lower extremity   Sensory: intact to LT  Gait: Deferred    LABS/ IMAGING    Lab Results   Component Value Date/Time    Cholesterol, total 170 05/18/2022 05:07 AM    HDL Cholesterol 44 05/18/2022 05:07 AM    LDL, calculated 111.8 (H) 05/18/2022 05:07 AM    VLDL, calculated 14.2 05/18/2022 05:07 AM    Triglyceride 71 05/18/2022 05:07 AM    CHOL/HDL Ratio 3.9 05/18/2022 05:07 AM     EEG shows focal slowing in the right central temporal region along with a rare sharply contoured waveform/potentially epileptiform    CT Results (most recent):  Results from Hospital Encounter encounter on 05/18/22    CT HEAD WO CONT    Narrative  INDICATION: Left sided weakeness    EXAM:  HEAD CT WITHOUT CONTRAST    COMPARISON: May 18, 2022    TECHNIQUE:  Routine noncontrast axial head CT was performed. Sagittal and  coronal reconstructions were generated. CT dose reduction was achieved through use of a standardized protocol tailored  for this examination and automatic exposure control for dose modulation. FINDINGS:    Ventricles: Midline, no hydrocephalus. Intracranial Hemorrhage: None. Brain Parenchyma/Brainstem: Normal for age. Basal Cisterns: Normal.  Paranasal Sinuses: Visualized sinuses are clear. Additional Comments: N/A. Impression  No acute process. No interval change. ASSESSMENT  Hospital Problems  Never Reviewed          Codes Class Noted POA    Abnormal EEG ICD-10-CM: R94.01  ICD-9-CM: 794.02  5/19/2022 Unknown        Stroke (cerebrum) [facility]) ICD-10-CM: I63.9  ICD-9-CM: 434.91  5/18/2022 Unknown               PLAN  Suspect small right MCA distribution stroke not evident on CT, cardioembolic versus atherothrombotic.   EEG changes are likely related to the same  Continue aspirin and statin  Recommend starting Keppra low-dose for now  Follow-up on echocardiogram  Unable to do MRI  Continue PT/OT  May transfer out of ICU and initiate discharge Val Pisano MD

## 2022-05-19 NOTE — PROGRESS NOTES
[facility] Adult  Hospitalist Group     ICU Transfer/Accept Summary     This patient is being [place] [address] [place]  DATE OF TRANSFER: 5/19/2022       PATIENT ID: Kimberly Barrios  MRN: [id number]   YOB: 1928    PRIMARY CARE PROVIDER: Joe Staley MD   DATE OF ADMISSION: 5/18/2022 12:51 AM    ATTENDING PHYSICIAN: Mine Chris MD  CONSULTATIONS:   IP CONSULT TO NEUROLOGY  IP CONSULT TO INTERVENTIONAL RADIOLOGY  IP CONSULT TO PALLIATIVE CARE - PROVIDER    PROCEDURES/SURGERIES:   * No surgery found *    REASON FOR ADMISSION: <principal problem not specified>     HOSPITAL PROBLEM LIST:  Patient Active Problem List   Diagnosis Code    Arthritis M19.90    Lumbar stenosis M48.061    Stroke (cerebrum) ([facility] [address] I63.9         Brief HPI and Hospital Course:      A 80year-old lady presented to ER at [facility] the evening of May 17 with left-sided weakness. CTA showed right MCA thrombus and underwent thrombectomy early morning on May 18. Overnight Events:   5/18: No acute event, cooperative and pleasant improvement in strength on left side, passed swallow eval, on low-dose nicardipine this morning but this is being weaned off. Follow-up CAT scan did not show complication    Patient conscious and alert, mild aphasia, oriented to person, she said she feels better, no left side chest pain       Assessment and Plan:    Right MCA ischemic stroke status post thrombectomy early morning on May 18:  -Some improvement in left-sided weakness.   No complication CAT scan.    -Continue aspirin and statin.    -Continue neuro check and supportive care   -EEG pending  -PT/OT  -Neurointerventional surgeon and Neurologist on board    HTN  -BP normal  -continue losartan, hydralazine as needed to maintain systolic blood pressure around 150. -Discontinued nicardipine  -monitor BP    Hypothyroidism  -TSH normal  -Continue home dose levothyroxine    Glaucoma:  -Stable, continue eyedrops     Hx ot T2DM  -A1c 5.8  -continue sliding scale and monitor finger stick glucose    HFpEF  S/p PPM  -compensated, on losartan  -home demadex on hold  -monitor I/O    COPD   -not bronchospastic   -SpO2 % on 2 l/m  -monitor pulse ox      Full Code, palliative care team on board    Code status: Full Code  DVT prophylaxis; heparin  Disposition: TBD             PHYSICAL EXAMINATION:  Visit Vitals  BP (!) 100/49   Pulse 72   Temp 97.9 °F (36.6 °C)   Resp 20   Wt 82.6 kg (182 lb 1.6 oz)   SpO2 93%   BMI 29.39 kg/m²       General:          Alert, cooperative, no distress  HEENT:           Atraumatic, MMM            Neck:               Supple, symmetrical,  thyroid: non tender  Lungs:             Clear to auscultation bilaterally. No Wheezing or Rhonchi. No rales. Heart:              Regular  rhythm,  No  murmur   No edema  Abdomen:       Soft, non-tender. Not distended. Bowel sounds normal  Extremities:     No cyanosis. No clubbing,  +2 distal pulses  Skin:                Not pale. Not Jaundiced  No rashes   Psych:             Not anxious or agitated. Neurologic:      Alert, moves all extremities, oriented to person. Labs:     Recent Labs     05/19/22 0246 05/18/22  0507   WBC 7.6 7.9   HGB 10.7* 10.8*   HCT 34.7* 35.2    193     Recent Labs     05/19/22 0246 05/18/22  0507 05/17/22  2300    139 140   K 3.5 3.7 3.4*    101 102   CO2 37* 37* 39*   BUN 7 15 17   CREA 0.72 1.04* 1.31*   GLU 89 123* 114*   CA 9.0 9.0 9.5   MG 1.8 1.9  --    PHOS 2.1* 2.6  --      Recent Labs     05/19/22 0246 05/18/22  0507 05/17/22  2300   ALT 15 16 20   AP 86 90 103   TBILI 0.7 0.5 0.5   TP 7.1 7.2 7.7   ALB 2.7* 2.7* 3.0*   GLOB 4.4* 4.5* 4.7*     Recent Labs     05/17/22  2300   INR 1.1   PTP 11.2*      No results for input(s): FE, TIBC, PSAT, FERR in the last 72 hours. No results found for: FOL, RBCF   No results for input(s): PH, PCO2, PO2 in the last 72 hours.   No results for input(s): CPK, CKNDX, TROIQ in the last 72 hours.     No lab exists for component: CPKMB  Lab Results   Component Value Date/Time    Cholesterol, total 170 05/18/2022 05:07 AM    HDL Cholesterol 44 05/18/2022 05:07 AM    LDL, calculated 111.8 (H) 05/18/2022 05:07 AM    Triglyceride 71 05/18/2022 05:07 AM    CHOL/HDL Ratio 3.9 05/18/2022 05:07 AM     Lab Results   Component Value Date/Time    Glucose (POC) 99 05/19/2022 05:31 AM    Glucose (POC) 88 05/18/2022 11:37 PM    Glucose (POC) 93 05/18/2022 06:01 PM    Glucose (POC) 109 05/18/2022 12:37 PM    Glucose (POC) 111 05/18/2022 06:38 AM     Lab Results   Component Value Date/Time    Color YELLOW 03/28/2012 07:30 PM    Appearance CLEAR 03/28/2012 07:30 PM    Specific gravity 1.012 03/28/2012 07:30 PM    Specific gravity 1.015 03/15/2012 07:15 PM    pH (UA) 6.0 03/28/2012 07:30 PM    Protein NEGATIVE  03/28/2012 07:30 PM    Glucose NEGATIVE  03/28/2012 07:30 PM    Ketone NEGATIVE  03/28/2012 07:30 PM    Bilirubin NEGATIVE  03/28/2012 07:30 PM    Urobilinogen 1.0 03/28/2012 07:30 PM    Nitrites NEGATIVE  03/28/2012 07:30 PM    Leukocyte Esterase NEGATIVE  03/28/2012 07:30 PM    Epithelial cells 0-5 03/28/2012 07:30 PM    Bacteria NEGATIVE  03/28/2012 07:30 PM    WBC 0-4 03/28/2012 07:30 PM    RBC 0-3 03/28/2012 07:30 PM         CODE STATUS:  x Full Code    DNR    Partial    Comfort Care       Signed:   Monalisa Godfrey MD  Date of Service:  5/19/2022  10:14 AM

## 2022-05-19 NOTE — PROGRESS NOTES
Neurocritical Care Brief Progress Note:    70-year-old female who presented to the ER at [facility] 22 evening with left-sided weakness. She is s/p right MCA thrombectomy on 22 by Dr. Kamron Reyes     Repeat [facility] post thrombectomy with no acute process. Started Aspirin 325 mg daily. A1c borderline 5.8, , will continue high-intensity statin Lipitor 80 mg to keep goal <70, and keep sbp goal <140. Will repeat CTH in the AM. Unable to obtain MRI 2/2 pacemaker in place. Pending echo. Patient with w/ L sided tremors. pending rEEG in the morning to ensure no epileptiform discharges       Physical Exam:  Gen: NAD, calm, cooperative  Neuro: A&O to self and . Follows commands. Speech clear. Affect normal. PERRL, 3 mm bilaterally. Blinks to threat. No disconjugate gaze present. EOMI. mild facial asymmetric. Kathi spontaneously. Strength 5/5 in RUE/RLE. Left arm and leg weakness LLE>LUE with tremors. Bulk and tone normal. Gait deferred. Skin: Warm, dry, color appropriate for ethnicity. Right groin arteriotomy site soft and non-tender on palpation, dressing is C/D/I, with no active bleeding or drainage noted. Positive pedal pulses bilaterally. NIHSS:    1a  Level of consciousness: 0=alert; keenly responsive   1b. LOC questions:  2=Answers neither question correctly   1c. LOC commands: 0=Performs both tasks correctly   2. Best Gaze: 0=normal   3. Visual: 0=No visual loss   4. Facial Palsy: 1=Minor paralysis (flattened nasolabial fold, asymmetric on smiling)   5a. Motor left arm: 2=Some effort against gravity, arm cannot get to or maintain (if cured) 90 (or 45) degrees, drifts down to bed, but has some effort against gravity. 5b. Motor right arm: 0=No drift, arm holds 90 (or 45) degrees for full 10 seconds   6a. Motor left le=Some effort against gravity; leg falls to bed by 5 seconds, but has some effort against gravity. 6b  Motor right le=No drift; leg holds 30-degree position for full 5 seconds.    7. Limb Ataxia: 0=Absent   8. Sensory: 0=Normal; no sensory loss   9. Best Language:  0=No aphasia, normal   10. Dysarthria: 1=Mild to moderate, patient slurs at least some words and at worst, can be understood with some difficulty   11. Extinction and Inattention: 0=No abnormality    Total:    8       Continue current plan per NIS.       Lavelle Nicholson NP  Neurocritical Care Nurse Practitioner  [phone]

## 2022-05-19 NOTE — PROCEDURES
[facility]  EEG    Name:  Pushpa Brown  MR#:  [id number]  :  1928  ACCOUNT #:  [de-identified]  DATE OF SERVICE:  2022    REQUESTING PROVIDER:  Desiree Dee NP    HISTORY:  The patient is a 59-year-old female who is being evaluated for altered mental status, she is status post right MCA thrombectomy on 2022. DESCRIPTION:  This is an 18-channel EEG performed on an awake patient. The dominant posterior background rhythm consists of symmetric, well-modulated medium voltage rhythms in the 9-10 Hz frequency range out of the posterior head region. There is intermittent theta slowing seen in the right central temporal region. A rare sharply contoured slow waveform was also noted in the right temporal area. Photic stimulation and hyperventilation were not performed. Drowsiness and sleep architecture were not noted. EEG SUMMARY:  Abnormal EEG due to mild slowing seen in the right central temporal region along with a rare sharply contoured slow waveform. CLINICAL INTERPRETATION:  This EEG shows mild focal slowing in the right central temporal region, which may be related to underlying structural lesion. A rare sharply contoured potentially epileptiform waveform was also noted, which may indicate a seizure predisposition. However, no electrographic seizures were seen. Please correlate with imaging.       MD TIM [email or web address]  D:  2022 14:02  T:  2022 15:29  JOB #:  [id number]

## 2022-05-19 NOTE — PROGRESS NOTES
Problem: Mobility Impaired (Adult and Pediatric)  Goal: *Acute Goals and Plan of Care (Insert Text)  Description:   FUNCTIONAL STATUS PRIOR TO ADMISSION: Patient was modified independent using a rolling walker for functional mobility. HOME SUPPORT PRIOR TO ADMISSION: The patient lived alone with family to provide assistance if/as needed per  notes. Physical Therapy Goals  Initiated 5/18/2022  1. Patient will move from supine to sit and sit to supine  and scoot up and down in bed with moderate assistance  within 7 day(s). 2.  Patient will transfer from bed to chair and chair to bed with maximal assistance using the least restrictive device within 7 day(s). 3.  Patient will perform sit to stand with maximal assistance within 7 day(s). 4.  Patient will demo fair sitting balance at EOB [id number] in prep for mobility progression within 7 days. 5.  Patient will improve Santana Balance score by 7 points within 7 days. Outcome: Progressing Towards Goal     PHYSICAL THERAPY TREATMENT  Patient: Clara Ceballos (23 y.o. female)  Date: 5/19/2022  Diagnosis: Stroke ([facility]br[facility] ([facility] [I63.9] <principal problem not specified>       Precautions: Fall  Chart, physical therapy assessment, plan of care and goals were reviewed. ASSESSMENT  Patient continues with skilled PT services and is progressing towards goals. Demos significantly improved alertness, command following, task initiation, and overall participation this date however remains most limited by pleasant confusion (A&O x1), L weakness and occasional mild inattention, impulsivity, impaired balance, and impaired gait leading to increased falls risk and dependency from baseline level. Today, she was able to mobilize to EOB with up to mod A x2 with one step cues and frequent redirection to task.  In sitting, worked on attaining/maintaining midline postural control - initially requiring max A to correct from strong posterior lean however improved to SBA with duration and cues. Progressed to performing multi level reaching in sitting followed by sit<>stands x2 reps and side stepping along EOB with NM facilitation for lateral weight shift and L LE advancement. Assisted back to bed per pt request at end of session 2* fatigue. Anticipate that she will be appropriate for chair transfers and possibly fwd gait progression next session . Rometta Favre Given indep baseline level and significant improvement in ability to participate, now recommending discharge to IPR to maximize NM recovery and indep    Current Level of Function Impacting Discharge (mobility/balance): mod A x2; L weakness; impaired balance    Other factors to consider for discharge: mod I at baseline, living at home; supportive family          PLAN :  Patient continues to benefit from skilled intervention to address the above impairments. Continue treatment per established plan of care. to address goals. Recommendation for discharge: (in order for the patient to meet his/her long term goals)  Therapy 3 hours per day 5-7 days per week pending consistent improved alertness and participation     This discharge recommendation:  A follow-up discussion with the attending provider and/or case management is planned    IF patient discharges home will need the following DME: to be determined (TBD)       SUBJECTIVE:   Patient stated Oh HIIIII ankurrt!     OBJECTIVE DATA SUMMARY:   Critical Behavior:  Neurologic State: Alert,Sleeping (initially sleeping but arouses)  Orientation Level: Oriented to person,Oriented to place  Cognition: Follows commands  Safety/Judgement: Decreased awareness of environment,Decreased awareness of need for assistance,Decreased awareness of need for safety,Decreased insight into deficits  Functional Mobility Training:  Bed Mobility:  Rolling: Maximum assistance  Supine to Sit: Maximum assistance;Assist x1  Sit to Supine: Moderate assistance;Assist x2  Scooting:  Moderate assistance (EOB) Transfers:  Sit to Stand: Minimum assistance;Assist x2  Stand to Sit: Minimum assistance;Assist x2     Balance:  Sitting: Impaired; Without support  Sitting - Static: Fair (occasional)  Sitting - Dynamic: Fair (occasional)  Standing: Impaired; With support  Standing - Static: Fair;Constant support  Standing - Dynamic : Poor;Constant support    Activity Tolerance:   Good    After treatment patient left in no apparent distress:   Supine in bed, Call bell within reach, Bed / chair alarm activated, and Side rails x 3    COMMUNICATION/COLLABORATION:   The patients plan of care was discussed with: Occupational therapist and Registered nurse. Patient was educated regarding Her deficit(s) of L weakness and inattention as this relates to Her diagnosis of CVA. She demonstrated Fair understanding as evidenced by nodding. Patient and/or family was verbally educated on the BE FAST acronym for signs/symptoms of CVA and TIA. BE FAST was written on patient's communication board  for visual education and reinforcement. All questions answered with patient indicating fair understanding.      Dl Cooley, PT, DPT   Time Calculation: 20 mins

## 2022-05-19 NOTE — PROGRESS NOTES
0730: Bedside and Verbal shift change report given to Deisy LIN RN (oncoming nurse) by Maricruz Jones RN  (offgoing nurse). Report included the following information SBAR, Kardex, Intake/Output, MAR, Recent Results and Cardiac Rhythm a paced. 1300: TRANSFER - OUT REPORT:    Verbal report given to Louie Hemphill RN(name) on Pat Billing  being transferred to NSTU(unit) for routine progression of care       Report consisted of patients Situation, Background, Assessment and   Recommendations(SBAR). Information from the following report(s) SBAR, Kardex, Intake/Output, MAR, Recent Results and Cardiac Rhythm a paced was reviewed with the receiving nurse. Lines:   Peripheral IV 05/18/22 Anterior;Left;Proximal Antecubital (Active)   Site Assessment Clean, dry, & intact 05/19/22 1200   Phlebitis Assessment 0 05/19/22 0800   Infiltration Assessment 0 05/19/22 0800   Dressing Status Clean, dry, & intact 05/19/22 0800   Dressing Type Tape;Transparent 05/19/22 0800   Hub Color/Line Status Pink; Infusing;Flushed 05/19/22 0800   Action Taken Open ports on tubing capped 05/19/22 0800   Alcohol Cap Used Yes 05/19/22 0800       Peripheral IV 05/18/22 Anterior;Proximal;Right Forearm (Active)   Site Assessment Clean, dry, & intact 05/19/22 1200   Phlebitis Assessment 0 05/19/22 0800   Infiltration Assessment 0 05/19/22 0800   Dressing Status Clean, dry, & intact 05/19/22 0800   Dressing Type Tape;Transparent 05/19/22 0800   Hub Color/Line Status Pink;Flushed;End cap changed 05/19/22 0800   Action Taken Open ports on tubing capped 05/19/22 0800   Alcohol Cap Used Yes 05/19/22 0800        Opportunity for questions and clarification was provided.       Patient transported with:   Monitor  O2 @ 2 liters  Registered Nurse  [place]

## 2022-05-19 NOTE — PROGRESS NOTES
Problem: Dysphagia (Adult)  Goal: *Acute Goals and Plan of Care (Insert Text)  Description: Speech pathology goals initiated 5/18/2022   1. Patient will tolerate a puree diet/ thin liquids free of s/s aspiration  2. Patient will demonstrate timely mastication and complete oral clearance of soft solid with slp  Outcome: Progressing Towards Goal     SPEECH LANGUAGE PATHOLOGY DYSPHAGIA TREATMENT  Patient: Yuliet Mcgee (80 y.o. female)  Date: 5/19/2022  Diagnosis: Stroke (cerebrum) ([facility] [I63.9] <principal problem not specified>       Precautions:  Fall    ASSESSMENT:  Pt tolerating pureed diet/thin liquids without any overt s/s of aspiration nor adverse effects. Pt does have continued left sided weakness with left sided residue appreciated with puree and is therefore not yet appropriate for diet advancement. Recommend continuation of diet with close monitoring of clinical tolerance of diet. PLAN:  Recommendations and Planned Interventions:  --Pureed diet/thin liquids  --alternate liquids/solids  --good oral care    Patient continues to benefit from skilled intervention to address the above impairments. Continue treatment per established plan of care. Discharge Recommendations: To Be Determined     SUBJECTIVE:   Patient stated, \"This is so good! .     OBJECTIVE:   Cognitive and Communication Status:  Neurologic State: Alert,Confused  Orientation Level: Oriented to person,Oriented to place  Cognition: Decreased attention/concentration,Decreased command following  Perception: Cues to attend left visual field,Cues to attend to left side of body  Perseveration: No perseveration noted  Safety/Judgement: Decreased awareness of environment,Decreased awareness of need for assistance,Decreased awareness of need for safety,Decreased insight into deficits  Dysphagia Treatment:  Oral Assessment:  Oral Assessment  Labial: Decreased rate;Decreased seal  Dentition: Edentulous  Oral Hygiene: oral mucosa moist and clear of secretions  Lingual: No impairment  Velum: No impairment  Mandible: No impairment  P.O. Trials:  Patient Position: upright in bed  Vocal quality prior to P.O.: No impairment  Consistency Presented: Thin liquid;Puree  How Presented: Self-fed/presented;Cup/sip;Spoon;Straw;Successive swallows     Bolus Acceptance: No impairment  Bolus Formation/Control: Impaired  Type of Impairment: Incomplete  Propulsion: No impairment  Oral Residue: Left  Initiation of Swallow: No impairment  Laryngeal Elevation: Functional  Aspiration Signs/Symptoms: None  Pharyngeal Phase Characteristics: No impairment, issues, or problems              Oral Phase Severity: Mild-moderate  Pharyngeal Phase Severity : No impairment  Pain:  Pain Scale 1: Numeric (0 - 10)  Pain Intensity 1: 0       After treatment:   Call bell within reach and Nursing notified    COMMUNICATION/EDUCATION:     The patient's plan of care including recommendations, planned interventions, and recommended diet changes were discussed with: Registered nurse.      Nikky Johnson SLP  Time Calculation: 15 mins

## 2022-05-19 NOTE — PROGRESS NOTES
Palliative Medicine  [place]: [phone]  [facility]: [phone])      The Palliative Medicine SW met with the patient at bedside along with Dr. Akosua Cano. The patient is awake and alert, although some confusion noted regarding situation- very pleasant with our team. She is eating her puree breakfast without difficulties or complaints. She has a bright affect and appears in good spirits. See Dr. Akosua Cano' note for additional information- our team spoke with family, goals for rehabilitation. The patient has six children- SW called PCP office- they do not have any AMD or MPOA paperwork on file. We will explore this with patient if she is able to complete- do not feel like with today's mentation she can complete, but will monitor. SW placed Music Therapy consultation for additional support, promote relaxation. Family lives far away [facility]. Patient has made gains, goal for rehabilitation at this time. Palliative Medicine will follow along with you.      Thank you for including Palliative Medicine in the care of [address]  [phone]

## 2022-05-19 NOTE — PROGRESS NOTES
[facility]    ICU TEAM Progress Note    Name: Yamile Das   : 1928   MRN: [id number]   Date: 2022      I  Subjective:   Progress Note: 2022      Reason for ICU Admission: Status post right MCA thrombectomy on May 18    Interval history:  25-year-old lady presented to ER at [facility] the evening of May 17 with left-sided weakness. CTA showed right MCA thrombus and underwent thrombectomy early morning on May 18. Overnight Events:   : No acute event, cooperative and pleasant improvement in strength on left side, passed swallow eval, on low-dose nicardipine this morning but this is being weaned off. Follow-up CAT scan did not show complication    Active Problem List:     Problem List  Never Reviewed          Codes Class    Stroke (cerebrum) ([facility] ICD-10-CM: I63.9  ICD-9-CM: 434.91         Lumbar stenosis ICD-10-CM: M48.061  ICD-9-CM: 724.02         Arthritis ICD-10-CM: M19.90  ICD-9-CM: 716.90               Past Medical History:      has a past medical history of Arthritis, Diabetes ([facility] Hard of hearing, Hypertension, Hypothyroidism, and Poor historian. Past Surgical History:      has a past surgical history that includes hx tubal ligation. Home Medications:     Prior to Admission medications    Medication Sig Start Date End Date Taking? Authorizing Provider   brimonidine-timoloL (Combigan) 0.2-0.5 % drop ophthalmic solution Administer 1 Drop to both eyes two (2) times a day. Yes Provider, Historical   losartan (COZAAR) 100 mg tablet Take 100 mg by mouth daily. Yes Provider, Historical   levothyroxine (SYNTHROID) 125 mcg tablet Take 125 mcg by mouth Daily (before breakfast). Yes Provider, Historical   torsemide (DEMADEX) 10 mg tablet Take 10 mg by mouth daily.    Yes Provider, Historical       Allergies/Social/Family History:     No Known Allergies   Social History     Tobacco Use    Smoking status: Former Smoker    Smokeless tobacco: Not on file   Substance Use Topics    Alcohol use: No      History reviewed. No pertinent family history. Review of Systems:     10 system reviewed and negative but as in interval history. Patient is a poor historian but very pleasant    Objective:   Vital Signs:  Visit Vitals  /73   Pulse 72   Temp 97.9 °F (36.6 °C)   Resp 19   Wt 82.6 kg (182 lb 1.6 oz)   SpO2 98%   BMI 29.39 kg/m²    O2 Flow Rate (L/min): 2 l/min O2 Device: Nasal cannula Temp (24hrs), Av.8 °F (36.6 °C), Min:97.5 °F (36.4 °C), Max:98.1 °F (36.7 °C)           Intake/Output:     Intake/Output Summary (Last 24 hours) at 2022 0929  Last data filed at 2022 0700  Gross per 24 hour   Intake 2215.84 ml   Output 700 ml   Net 1515.84 ml       Physical Exam:    General:  Alert, cooperative, well noursished, well developed, appears stated age   Eyes:  Sclera anicteric. Pupils equally round and reactive to light. Mouth/Throat: Mucous membranes normal, oral pharynx clear   Neck: Supple   Lungs:   Clear to auscultation bilaterally, good effort   CV:  Regular rate and rhythm,no murmur, click, rub or gallop   Abdomen:   Soft, non-tender. bowel sounds normal. non-distended   Extremities: No cyanosis or edema   Neuro: Alert and oriented at least to self and place, very pleasant might be her baseline. Face symmetrical speech is clear though incoherent at times. Normal power on right side able to move her left side against gravity though slightly weaker than right.        LABS AND  DATA: Personally reviewed  Recent Labs     22  0507   WBC 7.6 7.9   HGB 10.7* 10.8*   HCT 34.7* 35.2    193     Recent Labs     22  050    139   K 3.5 3.7    101   CO2 37* 37*   BUN 7 15   CREA 0.72 1.04*   GLU 89 123*   CA 9.0 9.0   MG 1.8 1.9   PHOS 2.1* 2.6     Recent Labs     22  0507   AP 86 90   TP 7.1 7.2   ALB 2.7* 2.7*   GLOB 4.4* 4.5*     Recent Labs     22  2300   INR 1.1 PTP 11.2*      No results for input(s): PHI, PCO2I, PO2I, FIO2I in the last 72 hours. No results for input(s): CPK, CKMB, TROIQ, BNPP in the last 72 hours. Hemodynamics:   PAP:   CO:     Wedge:   CI:     CVP:    SVR:       PVR:       Ventilator Settings:  Mode Rate Tidal Volume Pressure FiO2 PEEP                    Peak airway pressure:      Minute ventilation:          MEDS: Reviewed    Chest X-Ray:  CXR Results  (Last 48 hours)               05/18/22 0913  XR CHEST PORT Final result    Impression:  No acute abnormality or change compared to the prior exam.           Narrative: Indication: Decreased oxygen saturation       Comparison to earlier the same day. Portable exam obtained at 424 demonstrates   little change in the lung fields compared to prior examination. No acute   infiltrate is identified. 05/18/22 0022  XR CHEST PORT Final result    Impression:  No acute process. Narrative:  INDICATION: ams       EXAM:  AP CHEST RADIOGRAPH       COMPARISON: None       FINDINGS:       AP portable view of the chest demonstrates normal heart size. Right subclavian   pacemaker. There is no edema, effusion, consolidation, or pneumothorax. The   osseous structures are unremarkable. ECHO:  Ordered    Assessment and Plan:   -Right MCA ischemic stroke status post thrombectomy early morning on May 18: Some improvement in left-sided weakness. No complication CAT scan. Continue aspirin and statin. Resumed losartan for blood pressure control. Can use hydralazine as needed to maintain systolic blood pressure around 150. Discontinue nicardipine  - Hypothyroidism:  Continue home dose levothyroxine  - Glaucoma:  Continue eyedrops    We will start subcu heparin prophylaxis dose today  Neuro check Q2 to every 4  Appreciate interventional neurology and neurology    DISPOSITION  At this time no further need for ICU level of care.   Will consult hospitalist to transfer [facility] CONSULTANT NOTE  I had a face to face encounter with the patient, reviewed and interpreted patient data including clinical events, labs, images, vital signs, I/O's, and examined patient. I have discussed the case and the plan and management of the patient's care with the consulting services, the bedside nurses and the respiratory therapist.      NOTE OF PERSONAL INVOLVEMENT IN CARE   This patient has a high probability of imminent, clinically significant deterioration, which requires the highest level of preparedness to intervene urgently. I participated in the decision-making and personally managed or directed the management of the following life and organ supporting interventions that required my frequent assessment to treat or prevent imminent deterioration. I personally spent 40 minutes of critical care time. This is time spent at this critically ill patient's bedside actively involved in patient care as well as the coordination of care and discussions with the patient's family. This does not include any procedural time which has been billed separately. Darrian Marcus M.D.   Staff Intensivist/Pulmonologist  [facility]  5/19/2022

## 2022-05-19 NOTE — PROGRESS NOTES
Problem: Self Care Deficits Care Plan (Adult)  Goal: *Acute Goals and Plan of Care (Insert Text)  Description: FUNCTIONAL STATUS PRIOR TO ADMISSION: Patient unable to report prior level of function or home environment information at this time. Per chart review, patient independent for ADLs and uses a walker for ambulation at baseline. HOME SUPPORT: The patient lived with family but did not require assist. Family available to assist PRN per CM note. Occupational Therapy Goals  Initiated 5/18/2022  1. Patient will perform grooming sitting EOB with moderate assistance  within 7 day(s). 2.  Patient will perform lower body dressing with maximal assistance within 7 day(s). 3.  Patient will perform anterior bathing from neck to thighs with maximal assistance within 7 day(s). 4.  Patient will perform toilet transfers with maximal assistance within 7 day(s). 5.  Patient will perform all aspects of toileting with maximal assistance within 7 day(s). 6.  Patient will participate in upper extremity therapeutic exercise/activities with moderate assistance  for 5 minutes within 7 day(s). Outcome: Progressing Towards Goal   OCCUPATIONAL THERAPY TREATMENT  Patient: Clara Ceballos (22 y.o. female)  Date: 5/19/2022  Diagnosis: Stroke (cerebrum) ([facility] [I63.9] <principal problem not specified>       Precautions: Fall  Chart, occupational therapy assessment, plan of care, and goals were reviewed. ASSESSMENT  Patient continues with skilled OT services and is progressing towards goals. Patient presents today with improved alertness, pleasant and cooperative throughout, impaired orientation and no carryover of reorientation, improving sitting balance, impaired activity tolerance, and requires assist to advance left lower extremity in side stepping. Patient received sleeping in bed but arouses easily to voice and agreeable to working with therapy.  Patient opening both eyes spontaneously during session but remains unable to correctly state number of fingers, cannot maintain attention to task, unclear of baseline vision thought patient does report low vision. Patient with improved sitting balance at edge of bed this day and able to sit unsupported once feet on ground and cued for task. Patient was able to stand with simple cueing for improved command following and take a couple side steps with assist to advance left lower extremity. Patient participated in simple grooming task at edge of bed to wash face, continued task for extended period and required redirection to end task. Patient returned to semi supine in bed and repositioned for comfort. Patient would benefit from skilled OT services during admission to improve independence with self care and functional mobility/transfers. Recommend discharge to [facility] at this time as patient independent prior to admission and with significant improvement in participation this session. Current Level of Function Impacting Discharge (ADLs): min to max A x1-2 for mobility/transfers, setup simple grooming, total A lower extremity dressing    Other factors to consider for discharge: prior level of function mod independent, family support, significant improvement in participation this session         PLAN :  Patient continues to benefit from skilled intervention to address the above impairments. Continue treatment per established plan of care to address goals. Recommend with staff: bed in chair position for meals, bedpan for toileting    Recommend next OT session: continue POC    Recommendation for discharge: (in order for the patient to meet his/her long term goals)  Therapy 3 hours per day 5-7 days per week    This discharge recommendation:  Has not yet been discussed the attending provider and/or case management    IF patient discharges home will need the following DME: TBD pending progress       SUBJECTIVE:   Patient stated [facility].  When asked where we are    OBJECTIVE DATA SUMMARY: Cognitive/Behavioral Status:  Neurologic State: Alert;Sleeping (initially sleeping but arouses)  Orientation Level: Oriented to person;Oriented to place  Cognition: Follows commands             Functional Mobility and Transfers for ADLs:  Bed Mobility:  Rolling: Maximum assistance  Supine to Sit: Maximum assistance;Assist x1  Sit to Supine: Moderate assistance;Assist x2  Scooting: Moderate assistance (EOB)    Transfers:  Sit to Stand: Minimum assistance;Assist x2          Balance:  Sitting: Impaired; Without support  Sitting - Static: Fair (occasional)  Sitting - Dynamic: Fair (occasional)  Standing: Impaired; With support  Standing - Static: Fair;Constant support  Standing - Dynamic : Poor;Constant support    ADL Intervention:       Grooming  Position Performed: Seated edge of bed  Washing Face: Set-up (requires redirection to end task)                   Lower Body Dressing Assistance  Socks: Total assistance (dependent)  Leg Crossed Method Used: No  Position Performed: Seated edge of bed         Cognitive Retraining  Orientation Retraining: Reorienting;Place;Situation  Attention to Task: Distractibility  Following Commands: Follows one step commands/directions      Pain:  None reported    Activity Tolerance:   Fair, desaturates with exertion and requires oxygen and requires rest breaks    After treatment patient left in no apparent distress:   Supine in bed, Call bell within reach, Bed / chair alarm activated and Side rails x 3    COMMUNICATION/COLLABORATION:   The patients plan of care was discussed with: Physical therapist.     Patient was educated regarding Her deficit(s) of impaired balance and left sided deficits as this relates to Her diagnosis of CVA. She demonstrated Guarded understanding as evidenced by memory loss and no carryover of reorientation to situation and place.       Evan Guillen, OTR/L  Time Calculation: 21 mins

## 2022-05-20 LAB
ALBUMIN SERPL-MCNC: 2.7 G/DL (ref 3.5–5)
ALBUMIN/GLOB SERPL: 0.7 {RATIO} (ref 1.1–2.2)
ALP SERPL-CCNC: 88 U/L (ref 45–117)
ALT SERPL-CCNC: 15 U/L (ref 12–78)
ANION GAP SERPL CALC-SCNC: 2 MMOL/L (ref 5–15)
AST SERPL-CCNC: 24 U/L (ref 15–37)
BILIRUB SERPL-MCNC: 0.6 MG/DL (ref 0.2–1)
BUN SERPL-MCNC: 9 MG/DL (ref 6–20)
BUN/CREAT SERPL: 12 (ref 12–20)
CALCIUM SERPL-MCNC: 9.5 MG/DL (ref 8.5–10.1)
CHLORIDE SERPL-SCNC: 107 MMOL/L (ref 97–108)
CO2 SERPL-SCNC: 35 MMOL/L (ref 21–32)
CREAT SERPL-MCNC: 0.73 MG/DL (ref 0.55–1.02)
ERYTHROCYTE [DISTWIDTH] IN BLOOD BY AUTOMATED COUNT: 13.8 % (ref 11.5–14.5)
GLOBULIN SER CALC-MCNC: 4.1 G/DL (ref 2–4)
GLUCOSE BLD STRIP.AUTO-MCNC: 121 MG/DL (ref 65–117)
GLUCOSE BLD STRIP.AUTO-MCNC: 80 MG/DL (ref 65–117)
GLUCOSE BLD STRIP.AUTO-MCNC: 81 MG/DL (ref 65–117)
GLUCOSE BLD STRIP.AUTO-MCNC: 87 MG/DL (ref 65–117)
GLUCOSE SERPL-MCNC: 99 MG/DL (ref 65–100)
HCT VFR BLD AUTO: 35.2 % (ref 35–47)
HGB BLD-MCNC: 10.9 G/DL (ref 11.5–16)
MAGNESIUM SERPL-MCNC: 1.9 MG/DL (ref 1.6–2.4)
MCH RBC QN AUTO: 28.5 PG (ref 26–34)
MCHC RBC AUTO-ENTMCNC: 31 G/DL (ref 30–36.5)
MCV RBC AUTO: 91.9 FL (ref 80–99)
NRBC # BLD: 0 K/UL (ref 0–0.01)
NRBC BLD-RTO: 0 PER 100 WBC
PHOSPHATE SERPL-MCNC: 2.9 MG/DL (ref 2.6–4.7)
PLATELET # BLD AUTO: 178 K/UL (ref 150–400)
PMV BLD AUTO: 10.6 FL (ref 8.9–12.9)
POTASSIUM SERPL-SCNC: 3.9 MMOL/L (ref 3.5–5.1)
PROT SERPL-MCNC: 6.8 G/DL (ref 6.4–8.2)
RBC # BLD AUTO: 3.83 M/UL (ref 3.8–5.2)
SERVICE CMNT-IMP: ABNORMAL
SERVICE CMNT-IMP: NORMAL
SODIUM SERPL-SCNC: 144 MMOL/L (ref 136–145)
WBC # BLD AUTO: 5.1 K/UL (ref 3.6–11)

## 2022-05-20 PROCEDURE — 74011250637 HC RX REV CODE- 250/637: Performed by: INTERNAL MEDICINE

## 2022-05-20 PROCEDURE — 84100 ASSAY OF PHOSPHORUS: CPT

## 2022-05-20 PROCEDURE — 83735 ASSAY OF MAGNESIUM: CPT

## 2022-05-20 PROCEDURE — 74011250637 HC RX REV CODE- 250/637: Performed by: NURSE PRACTITIONER

## 2022-05-20 PROCEDURE — 80053 COMPREHEN METABOLIC PANEL: CPT

## 2022-05-20 PROCEDURE — 74011250637 HC RX REV CODE- 250/637: Performed by: HOSPITALIST

## 2022-05-20 PROCEDURE — 92526 ORAL FUNCTION THERAPY: CPT

## 2022-05-20 PROCEDURE — 74011000250 HC RX REV CODE- 250: Performed by: NURSE PRACTITIONER

## 2022-05-20 PROCEDURE — 85027 COMPLETE CBC AUTOMATED: CPT

## 2022-05-20 PROCEDURE — 36415 COLL VENOUS BLD VENIPUNCTURE: CPT

## 2022-05-20 PROCEDURE — 74011250636 HC RX REV CODE- 250/636: Performed by: INTERNAL MEDICINE

## 2022-05-20 PROCEDURE — 82962 GLUCOSE BLOOD TEST: CPT

## 2022-05-20 PROCEDURE — 74011250637 HC RX REV CODE- 250/637: Performed by: PSYCHIATRY & NEUROLOGY

## 2022-05-20 PROCEDURE — 97535 SELF CARE MNGMENT TRAINING: CPT

## 2022-05-20 PROCEDURE — 65660000001 HC RM ICU INTERMED STEPDOWN

## 2022-05-20 PROCEDURE — 97530 THERAPEUTIC ACTIVITIES: CPT

## 2022-05-20 RX ADMIN — LEVOTHYROXINE SODIUM 112 MCG: 0.11 TABLET ORAL at 07:23

## 2022-05-20 RX ADMIN — HEPARIN SODIUM 5000 UNITS: 5000 INJECTION INTRAVENOUS; SUBCUTANEOUS at 06:12

## 2022-05-20 RX ADMIN — BRIMONIDINE TARTRATE 1 DROP: 2 SOLUTION/ DROPS OPHTHALMIC at 09:48

## 2022-05-20 RX ADMIN — SODIUM CHLORIDE, PRESERVATIVE FREE 10 ML: 5 INJECTION INTRAVENOUS at 01:56

## 2022-05-20 RX ADMIN — DIBASIC SODIUM PHOSPHATE, MONOBASIC POTASSIUM PHOSPHATE AND MONOBASIC SODIUM PHOSPHATE 1 TABLET: 852; 155; 130 TABLET ORAL at 17:56

## 2022-05-20 RX ADMIN — ATORVASTATIN CALCIUM 80 MG: 40 TABLET, FILM COATED ORAL at 09:47

## 2022-05-20 RX ADMIN — LEVETIRACETAM 250 MG: 250 TABLET, FILM COATED ORAL at 17:56

## 2022-05-20 RX ADMIN — HEPARIN SODIUM 5000 UNITS: 5000 INJECTION INTRAVENOUS; SUBCUTANEOUS at 01:52

## 2022-05-20 RX ADMIN — BRIMONIDINE TARTRATE 1 DROP: 2 SOLUTION/ DROPS OPHTHALMIC at 17:57

## 2022-05-20 RX ADMIN — HEPARIN SODIUM 5000 UNITS: 5000 INJECTION INTRAVENOUS; SUBCUTANEOUS at 22:00

## 2022-05-20 RX ADMIN — ASPIRIN 325 MG ORAL TABLET 325 MG: 325 PILL ORAL at 09:47

## 2022-05-20 RX ADMIN — TIMOLOL MALEATE 1 DROP: 5 SOLUTION OPHTHALMIC at 09:48

## 2022-05-20 RX ADMIN — LEVOTHYROXINE SODIUM 112 MCG: 0.11 TABLET ORAL at 06:09

## 2022-05-20 RX ADMIN — HEPARIN SODIUM 5000 UNITS: 5000 INJECTION INTRAVENOUS; SUBCUTANEOUS at 13:08

## 2022-05-20 RX ADMIN — SODIUM CHLORIDE, PRESERVATIVE FREE 10 ML: 5 INJECTION INTRAVENOUS at 14:00

## 2022-05-20 RX ADMIN — LEVETIRACETAM 250 MG: 250 TABLET, FILM COATED ORAL at 09:47

## 2022-05-20 RX ADMIN — TIMOLOL MALEATE 1 DROP: 5 SOLUTION OPHTHALMIC at 17:57

## 2022-05-20 RX ADMIN — LOSARTAN POTASSIUM 100 MG: 50 TABLET, FILM COATED ORAL at 09:47

## 2022-05-20 RX ADMIN — DIBASIC SODIUM PHOSPHATE, MONOBASIC POTASSIUM PHOSPHATE AND MONOBASIC SODIUM PHOSPHATE 1 TABLET: 852; 155; 130 TABLET ORAL at 09:46

## 2022-05-20 RX ADMIN — SODIUM CHLORIDE, PRESERVATIVE FREE 10 ML: 5 INJECTION INTRAVENOUS at 01:57

## 2022-05-20 NOTE — PROGRESS NOTES
Problem: Dysphagia (Adult)  Goal: *Acute Goals and Plan of Care (Insert Text)  Description: Speech pathology goals initiated 5/18/2022   1. Patient will tolerate a puree diet/ thin liquids free of s/s aspiration  2. Patient will demonstrate timely mastication and complete oral clearance of soft solid with slp  Outcome: Progressing Towards Goal     SPEECH LANGUAGE PATHOLOGY DYSPHAGIA TREATMENT  Patient: Kimberly Barrios (62 y.o. female)  Date: 5/20/2022  Diagnosis: Stroke [facility]cerebrum) ([facility] [I63.9] <principal problem not specified>       Precautions:  Fall    ASSESSMENT:  Patient seen with breakfast tray this morning. Patient perseverative which limited PO intake, putting pureed berries from container onto plate and picking at pulse ox. When patient finally began eating, patient tolerated purees well. Solids not offered given prolonged oral phase with purees. Patient remains confused and dysarthric. PLAN:  Recommendations and Planned Interventions:  -Continue puree/thin liquid diet  -SLP to follow for diet tolerance and liberalization as mental status improves  Patient continues to benefit from skilled intervention to address the above impairments. Continue treatment per established plan of care. Discharge Recommendations: To Be Determined     SUBJECTIVE:   Patient stated No we're in [facility] when asked what city we are in. Oriented to person only.     OBJECTIVE:   Cognitive and Communication Status:  Neurologic State: Alert,Confused  Orientation Level: Oriented to person,Disoriented to place,Disoriented to situation,Disoriented to time  Cognition: Decreased attention/concentration,Decreased command following  Perception: Cues to attend left visual field,Cues to attend to left side of body  Perseveration: No perseveration noted  Safety/Judgement: Decreased awareness of environment,Decreased awareness of need for assistance,Decreased awareness of need for safety,Decreased insight into deficits  Dysphagia Treatment:     P.O. Trials:  Patient Position: upright in bed  Vocal quality prior to P.O.: No impairment (dysarthric)  Consistency Presented: Puree (breakfast tray)  How Presented: Self-fed/presented;SLP-fed/presented;Spoon     Bolus Acceptance: No impairment  Bolus Formation/Control: Impaired  Type of Impairment: Delayed  Propulsion: Delayed (# of seconds)  Oral Residue: None        Aspiration Signs/Symptoms: None           Comments: perseverative              After treatment:   Patient left in no apparent distress in bed, Call bell within reach, and Nursing notified    COMMUNICATION/EDUCATION:   Patient was too confused to benefit from education. The patient's plan of care including recommendations, planned interventions, and recommended diet changes were discussed with: Registered nurse.      BRANDY Valdez  Time Calculation: 15 mins

## 2022-05-20 NOTE — PROGRESS NOTES
Transition of Care Plan: [facility] pending medical stability and bed availability    RUR: 12% low    PCP F/U: Aaliyah Hayes MD    Disposition:  [facility]    Transportation: BLS    Main Contact: Daughter: Bishop Gerard: [phone]: Reviewed therapy notes. Recommending [facility]. Spoke with MD and would like this CM to set up. Spoke with kristina Yuan. Would like referral sent to the [facility] since it is closer. Referral sent. Dawn Gardiner RN, CRM    Transition of Care Plan:     The Plan for Transition of Care is related to the following treatment goals:[facility]    The Patient and/or patient representative was provided with a choice of provider and agrees  with the discharge plan. Yes [x] No []    A Freedom of choice list was provided with basic dialogue that supports the patient's individualized plan of care/goals and shares the quality data associated with the providers.        Yes [x] No []

## 2022-05-20 NOTE — PROGRESS NOTES
Bedside shift change report given to Randi/NEGAR (oncoming nurse) by Renata/RICHARD (offgoing nurse). Report included the following information SBAR, Kardex, Intake/Output, MAR, Accordion, Med Rec Status, Alarm Parameters , Quality Measures and Dual Neuro Assessment.

## 2022-05-20 NOTE — PROGRESS NOTES
[facility] [facility] Adult  Hospitalist Group                                                                                          Hospitalist Progress Note  Michael Cooper MD  Answering service: [phone] from in house phone        Date of Service:  2022  NAME:  Selena Aguayo  :  1928  MRN:  [id number]      Admission Summary:     A 80-year-old lady presented to ER at [facility] the evening of May 17 with left-sided weakness.  CTA showed right MCA thrombus and underwent thrombectomy early morning on May 18.    : No acute event, cooperative and pleasant improvement in strength on left side, passed swallow eval, on low-dose nicardipine this morning but this is being weaned off.  Follow-up CAT scan did not show complication and transferred out of ICU on     Interval history / Subjective:     Poor historian, she said she feels better, no left side chest pain,     Assessment & Plan:     Right MCA ischemic stroke status post thrombectomy early morning on May 18:  -improvement in left-sided weakness.   -Continue aspirin 325 mg and statin.    -started on keppra 250 bid   -Continue neuro check and supportive care   -EEG pending  -continue PT/OT  -Neurointerventional surgeon and Neurologist on board     HTN  -BP normal  -continue losartan, hydralazine as needed to maintain systolic blood pressure around -Discontinued nicardipine gtt  -monitor BP     Hypothyroidism  -TSH normal  -Continue home dose levothyroxine     Glaucoma  -Stable, continue eyedrops     Hx ot T2DM  -A1c 5.8  -continue sliding scale and monitor finger stick glucose     HFpEF  S/p PPM  -compensated, on losartan  -home demadex on hold  -monitor I/O     COPD   -not bronchospastic   -SpO2 % on 2 l/m  -monitor pulse ox    Debility  -continue PT/OT          Code status: Full Code   Prophylaxis: Heparin  Care Plan discussed with:Patient, Nurse and CM  Anticipated Disposition: Inpatient Rehab Hospital Problems  Never Reviewed          Codes Class Noted POA    Abnormal EEG ICD-10-CM: R94.01  ICD-9-CM: 794.02  5/19/2022 Unknown        Stroke (cerebrum) [facility]) ICD-10-CM: I63.9  ICD-9-CM: 434.91  5/18/2022 Unknown                 Vital Signs:    Last 24hrs VS reviewed since prior progress note. Most recent are:  Visit Vitals  /64 (BP 1 Location: Left upper arm, BP Patient Position: At rest)   Pulse 60   Temp 98 °F (36.7 °C)   Resp 16   Wt 82.6 kg (182 lb 1.6 oz)   SpO2 100%   BMI 29.39 kg/m²         Intake/Output Summary (Last 24 hours) at 5/20/2022 1519  Last data filed at 5/20/2022 1000  Gross per 24 hour   Intake 240 ml   Output --   Net 240 ml        Physical Examination:     I had a face to face encounter with this patient and independently examined them on 5/20/2022 as outlined below:          Constitutional:  No acute distress, cooperative, pleasant    ENT:  Oral mucosa moist, oropharynx benign. Resp:  CTA bilaterally. No wheezing/rhonchi/rales. No accessory muscle use. CV:  Regular rhythm, normal rate, no murmurs, gallops, rubs    GI:  Soft, non distended, non tender.  normoactive bowel sounds, no hepatosplenomegaly     Musculoskeletal:  No edema,     Neurologic:  Conscious and alert, oriented to place and person, Moves all extremities, CN II-XII reviewed            Data Review:    Review and/or order of clinical lab test  Review and/or order of tests in the radiology section of CPT  Review and/or order of tests in the medicine section of CPT      Labs:     Recent Labs     05/20/22  1006 05/19/22  0246   WBC 5.1 7.6   HGB 10.9* 10.7*   HCT 35.2 34.7*    169     Recent Labs     05/20/22  1006 05/19/22  0246 05/18/22  0507    141 139   K 3.9 3.5 3.7    103 101   CO2 35* 37* 37*   BUN 9 7 15   CREA 0.73 0.72 1.04*   GLU 99 89 123*   CA 9.5 9.0 9.0   MG 1.9 1.8 1.9   PHOS 2.9 2.1* 2.6     Recent Labs     05/20/22  1006 05/19/22  0246 05/18/22  0507   ALT 15 15 16   AP 88 86 90   TBILI 0.6 0.7 0.5   TP 6.8 7.1 7.2   ALB 2.7* 2.7* 2.7*   GLOB 4.1* 4.4* 4.5*     Recent Labs     05/17/22  2300   INR 1.1   PTP 11.2*      No results for input(s): FE, TIBC, PSAT, FERR in the last 72 hours. No results found for: FOL, RBCF   No results for input(s): PH, PCO2, PO2 in the last 72 hours. No results for input(s): CPK, CKNDX, TROIQ in the last 72 hours.     No lab exists for component: CPKMB  Lab Results   Component Value Date/Time    Cholesterol, total 170 05/18/2022 05:07 AM    HDL Cholesterol 44 05/18/2022 05:07 AM    LDL, calculated 111.8 (H) 05/18/2022 05:07 AM    Triglyceride 71 05/18/2022 05:07 AM    CHOL/HDL Ratio 3.9 05/18/2022 05:07 AM     Lab Results   Component Value Date/Time    Glucose (POC) 121 (H) 05/20/2022 12:03 PM    Glucose (POC) 80 05/20/2022 06:29 AM    Glucose (POC) 81 05/20/2022 01:49 AM    Glucose (POC) 100 05/19/2022 04:30 PM    Glucose (POC) 100 05/19/2022 11:31 AM     Lab Results   Component Value Date/Time    Color YELLOW 03/28/2012 07:30 PM    Appearance CLEAR 03/28/2012 07:30 PM    Specific gravity 1.012 03/28/2012 07:30 PM    Specific gravity 1.015 03/15/2012 07:15 PM    pH (UA) 6.0 03/28/2012 07:30 PM    Protein NEGATIVE  03/28/2012 07:30 PM    Glucose NEGATIVE  03/28/2012 07:30 PM    Ketone NEGATIVE  03/28/2012 07:30 PM    Bilirubin NEGATIVE  03/28/2012 07:30 PM    Urobilinogen 1.0 03/28/2012 07:30 PM    Nitrites NEGATIVE  03/28/2012 07:30 PM    Leukocyte Esterase NEGATIVE  03/28/2012 07:30 PM    Epithelial cells 0-5 03/28/2012 07:30 PM    Bacteria NEGATIVE  03/28/2012 07:30 PM    WBC 0-4 03/28/2012 07:30 PM    RBC 0-3 03/28/2012 07:30 PM         Medications Reviewed:     Current Facility-Administered Medications   Medication Dose Route Frequency    phosphorus (K PHOS NEUTRAL) 250 mg tablet 1 Tablet  1 Tablet Oral BID    losartan (COZAAR) tablet 100 mg  100 mg Oral DAILY    atorvastatin (LIPITOR) tablet 80 mg  80 mg Oral DAILY    aspirin tablet 325 mg  325 mg Oral DAILY    heparin (porcine) injection 5,000 Units  5,000 Units SubCUTAneous Q8H    insulin lispro (HUMALOG) injection   SubCUTAneous AC&HS    levETIRAcetam (KEPPRA) tablet 250 mg  250 mg Oral BID    acetaminophen (TYLENOL) tablet 650 mg  650 mg Oral Q4H PRN    Or    acetaminophen (TYLENOL) solution 650 mg  650 mg Per NG tube Q4H PRN    Or    acetaminophen (TYLENOL) suppository 650 mg  650 mg Rectal Q4H PRN    sodium chloride (NS) flush 5-40 mL  5-40 mL IntraVENous Q8H    sodium chloride (NS) flush 5-40 mL  5-40 mL IntraVENous PRN    glucose chewable tablet 16 g  4 Tablet Oral PRN    dextrose 10 % infusion 0-250 mL  0-250 mL IntraVENous PRN    glucagon (GLUCAGEN) injection 1 mg  1 mg IntraMUSCular PRN    levothyroxine (SYNTHROID) tablet 112 mcg  112 mcg Oral ACB    brimonidine (ALPHAGAN) 0.2 % ophthalmic solution 1 Drop  1 Drop Both Eyes BID    And    timolol (TIMOPTIC) 0.5 % ophthalmic solution 1 Drop  1 Drop Both Eyes BID     ______________________________________________________________________  EXPECTED LENGTH OF STAY: 4d 2h  ACTUAL LENGTH OF STAY:          2                 Kelsey Victor MD

## 2022-05-20 NOTE — PROGRESS NOTES
Problem: Self Care Deficits Care Plan (Adult)  Goal: *Acute Goals and Plan of Care (Insert Text)  Description: FUNCTIONAL STATUS PRIOR TO ADMISSION: Patient unable to report prior level of function or home environment information at this time. Per chart review, patient independent for ADLs and uses a walker for ambulation at baseline. HOME SUPPORT: The patient lived with family but did not require assist. Family available to assist PRN per CM note. Occupational Therapy Goals  Initiated 5/18/2022  1. Patient will perform grooming sitting EOB with moderate assistance  within 7 day(s). 2.  Patient will perform lower body dressing with maximal assistance within 7 day(s). 3.  Patient will perform anterior bathing from neck to thighs with maximal assistance within 7 day(s). 4.  Patient will perform toilet transfers with maximal assistance within 7 day(s). 5.  Patient will perform all aspects of toileting with maximal assistance within 7 day(s). 6.  Patient will participate in upper extremity therapeutic exercise/activities with moderate assistance  for 5 minutes within 7 day(s). Outcome: Progressing Towards Goal   OCCUPATIONAL THERAPY TREATMENT  Patient: Venancio Root (21 y.o. female)  Date: 5/20/2022  Diagnosis: Stroke (cerebrum) [facility]) [I63.9] <principal problem not specified>       Precautions: Fall  Chart, occupational therapy assessment, plan of care, and goals were reviewed. ASSESSMENT  Patient continues with skilled OT services and is progressing towards goals. Patient received semi supine in bed, alert and confused, agreeable to working with therapy. Patient presents with impaired balance, difficulty advancing legs in standing, hard of hearing, requiring cueing for command following, generally confused but knows she is in the hospital today, and improved sit -> stand using RW.  Patient transferred to sitting edge of bed and total A for lower extremity dressing due to poor sitting balance with posterior lean. Patient stood at edge of bed and noted brief and chux pad soiled with urine. Patient stood additional time to change pad and attempted side stepping but difficulty completing at this time, unsafe for transfer to chair today but continue to progress. Initial sit to stand with hand held assist, second attempt with RW with noted improvement with hands supported on walker, likely as patient is familiar with device at baseline. Patient returned to semi supine in bed and rolled to complete bed level toileting/brief change and lower body CHG bath. HOB raised and patient participating in simple grooming task to wash face with min A for thoroughness around eyes and mouth. Overall patient did fair with session today but remains limited by poor balance and general confusion. Patient would benefit from skilled OT services during admission to improve independence with self care and functional mobility/transfers. Recommend discharge to [facility] at this time. Current Level of Function Impacting Discharge (ADLs): min to max A x2 for mobility/transfers, min A upper extremity activities of daily living, total A lower extremity activities of daily living     Other factors to consider for discharge: prior level of function, walks with RW at baseline, family support         PLAN :  Patient continues to benefit from skilled intervention to address the above impairments. Continue treatment per established plan of care to address goals.     Recommend with staff: bed in chair position for meals, bedpan for toileting    Recommend next OT session: continue POC    Recommendation for discharge: (in order for the patient to meet his/her long term goals)  Therapy 3 hours per day 5-7 days per week    This discharge recommendation:  Has been made in collaboration with the attending provider and/or case management    IF patient discharges home will need the following DME: TBD pending progress       SUBJECTIVE:   Patient stated [employer] baby, that sounds nice.     OBJECTIVE DATA SUMMARY:   Cognitive/Behavioral Status:  Neurologic State: Alert;Confused  Orientation Level: Oriented to person;Oriented to place (able to state she is in the hospital today)  Cognition: Decreased attention/concentration;Decreased command following             Functional Mobility and Transfers for ADLs:  Bed Mobility:  Rolling: Maximum assistance  Supine to Sit: Maximum assistance;Assist x2  Sit to Supine: Maximum assistance;Assist x2  Scooting: Moderate assistance    Transfers:  Sit to Stand: Minimum assistance; Moderate assistance;Assist x2          Balance:  Sitting: Impaired; Without support  Sitting - Static: Fair (occasional)  Sitting - Dynamic: Fair (occasional)  Standing: Impaired; With support  Standing - Static: Constant support;Fair;Poor  Standing - Dynamic : Constant support;Poor    ADL Intervention:       Grooming  Position Performed: Other (comment) (HOB raised)  Washing Face: Minimum assistance (for thoroughness)         Lower Body Bathing  Bathing Assistance: Total assistance(dependent)  Perineal  : Total assistance (dependent)  Position Performed: Supine  Lower Body : Total assistance (dependent)  Position Performed: Supine         Lower Body Dressing Assistance  Underpants: Total assistance (dependent) (don/doff brief)  Socks: Total assistance (dependent)  Leg Crossed Method Used: No  Position Performed: Seated edge of bed    Toileting  Bladder Hygiene: Total assistance (dependent)  Bowel Hygiene:  Total assistance (dependent)  Clothing Management: Total assistance (dependent)    Cognitive Retraining  Orientation Retraining: Reorienting;Situation    Neuro Re-Education:             Therapeutic Exercises:   Patient able to follow commands with increased cueing to raise bilateral upper extremities in bed, left with impaired range compared to right    Pain:  Pt denied pain    Activity Tolerance:   Good and SpO2 stable on RA    After treatment patient left in no apparent distress:   Supine in bed, Call bell within reach, Bed / chair alarm activated and Side rails x 3    COMMUNICATION/COLLABORATION:   The patients plan of care was discussed with: Physical therapist and Registered nurse. Patient was educated regarding Her deficit(s) of impaired balance as this relates to Her diagnosis of CVA. She demonstrated Guarded understanding as evidenced by limited carryover of education.       Joshua Anderson, OTR/L  Time Calculation: 24 mins

## 2022-05-20 NOTE — PROGRESS NOTES
Physician Progress Note      Vadim Johnson  CSN #:                  [id number]  :                       1928  ADMIT DATE:       2022 12:51 AM  DISCH DATE:  RESPONDING  PROVIDER #:        Winston Perdomo MD          QUERY TEXT:    Pt admitted with CVA  Pt noted to have AMS corrected with surgery . If possible, please document in the progress notes and discharge summary if you are evaluating and / or treating any of the following: The medical record reflects the following:  Risk Factors: CVA  Clinical Indicators:  [facility] ED  Pt arrives via ems from home for ams.   neuro  Clinically she is alert and oriented to self/person only, does not follow commands, does talk not meaningful, spontaneously moves her right side, neglect noted on the left    Treatment: thrombectomy    Thank you,  Lenny Owens RN, CDI, CRCR, CCDS  Certified Clinical Documentation   [phone]  Options provided:  -- Encephalopathy due to CVA  -- Delirium due to, Please specify cause. -- Delirium  -- Other - I will add my own diagnosis  -- Disagree - Not applicable / Not valid  -- Disagree - Clinically unable to determine / Unknown  -- Refer to Clinical Documentation Reviewer    PROVIDER RESPONSE TEXT:    Provider is clinically unable to determine a response to this query. Query created by: Matt Olguin on 2022 12:18 PM      QUERY TEXT:    Pt admitted with CVA  Pt noted to have COPD and uses supplemental oxygen at home . If possible, please document in the progress notes and discharge summary if you are evaluating and/or treating any of the following:       The medical record reflects the following:  Risk Factors: COPD/ pleural effusion  Clinical Indicators:   PN  On 2L NC at baseline from recent pleural effusion about a month ago per daughter    Treatment: supplemental oxygen @ 2lts/min      Thank you,  Lenny Owens RN, CDI, CRCR, CCDS  Certified Clinical Documentation   [phone]  Options provided:  -- Chronic respiratory failure with hypoxia  -- Chronic respiratory failure with hypercapnia  -- Other - I will add my own diagnosis  -- Disagree - Not applicable / Not valid  -- Disagree - Clinically unable to determine / Unknown  -- Refer to Clinical Documentation Reviewer    PROVIDER RESPONSE TEXT:    Provider is clinically unable to determine a response to this query.     Query created by: Rachna Zapata on 5/20/2022 12:20 PM      Electronically signed by:  Janice Smith MD 5/20/2022 5:58 PM

## 2022-05-20 NOTE — PROGRESS NOTES
Problem: Mobility Impaired (Adult and Pediatric)  Goal: *Acute Goals and Plan of Care (Insert Text)  Description:   FUNCTIONAL STATUS PRIOR TO ADMISSION: Patient was modified independent using a rolling walker for functional mobility. HOME SUPPORT PRIOR TO ADMISSION: The patient lived alone with family to provide assistance if/as needed per  notes. Physical Therapy Goals  Initiated 5/18/2022  1. Patient will move from supine to sit and sit to supine  and scoot up and down in bed with moderate assistance  within 7 day(s). 2.  Patient will transfer from bed to chair and chair to bed with maximal assistance using the least restrictive device within 7 day(s). 3.  Patient will perform sit to stand with maximal assistance within 7 day(s). 4.  Patient will demo fair sitting balance at EOB [id number] in prep for mobility progression within 7 days. 5.  Patient will improve Santana Balance score by 7 points within 7 days. Outcome: Progressing Towards Goal   PHYSICAL THERAPY TREATMENT  Patient: Yamile Das (73 y.o. female)  Date: 5/20/2022  Diagnosis: [facility] [I63.9] <principal problem not specified>       Precautions: Fall  Chart, physical therapy assessment, plan of care and goals were reviewed. p  ASSESSMENT  Patient continues with skilled PT services and is progressing towards goals. Pt received supine in bed and agreeable to therapy. Pt tolerated session well, but continues to be limited by confusion, decreased functional mobility, impaired seated and standing balance, difficulty ambulating at this time, increased risk for falls and dependency from previous mod I baseline. Pt oriented to self and location this date. Pt continues to require max A x 2 supine to sit EOB. Pt able to initiate movement towards EOB with LEs and reaching for bed railings. Pt with posterior leaning while seated EOB and required up to mod A initially, but improved to CGA.  Pt tolerated sit<>stands with RW with min A-mod A x 2 without RW initially, but improved to min A x 2 with RW. Pt unable to take steps this date due to strong posterior leaning. Continue to recommend inpatient rehab upon discharge    Current Level of Function Impacting Discharge (mobility/balance): max A x 2 supine to sit, min-mod A x 2 sit<>Stand with RW    Other factors to consider for discharge:          PLAN :  Patient continues to benefit from skilled intervention to address the above impairments. Continue treatment per established plan of care. to address goals. Recommendation for discharge: (in order for the patient to meet his/her long term goals)  Therapy 3 hours per day 5-7 days per week    This discharge recommendation:  Has been made in collaboration with the attending provider and/or case management    IF patient discharges home will need the following DME: to be determined (TBD)       SUBJECTIVE:   Patient stated [facility] baby! Lexusenrique Melanie    OBJECTIVE DATA SUMMARY:   Critical Behavior:  Neurologic State: Alert,Confused  Orientation Level: Oriented to person,Disoriented to place,Disoriented to situation,Disoriented to time  Cognition: Decreased attention/concentration,Decreased command following  Safety/Judgement: Decreased awareness of environment,Decreased awareness of need for assistance,Decreased awareness of need for safety,Decreased insight into deficits  Functional Mobility Training:  Bed Mobility:  Rolling: Maximum assistance  Supine to Sit: Maximum assistance;Assist x2  Sit to Supine: Maximum assistance;Assist x2  Scooting: Moderate assistance        Transfers:  Sit to Stand: Minimum assistance; Moderate assistance;Assist x2  Stand to Sit: Minimum assistance;Assist x2 (with and without RW)                             Balance:  Sitting: Impaired; Without support  Sitting - Static: Fair (occasional)  Sitting - Dynamic: Fair (occasional)  Standing: Impaired; With support  Standing - Static: Constant support;Fair;Poor  Standing - Dynamic : Constant support;Poor  Ambulation/Gait Training:                                                          Pain Rating:  Pt denied pain    Activity Tolerance:   Good    After treatment patient left in no apparent distress:   Supine in bed, Call bell within reach, Bed / chair alarm activated, and Side rails x 3    COMMUNICATION/COLLABORATION:   The patients plan of care was discussed with: Occupational therapist and Registered nurse.      Jose Higgins, PT, DPT   Time Calculation: 23 mins

## 2022-05-21 LAB
GLUCOSE BLD STRIP.AUTO-MCNC: 109 MG/DL (ref 65–117)
GLUCOSE BLD STRIP.AUTO-MCNC: 109 MG/DL (ref 65–117)
GLUCOSE BLD STRIP.AUTO-MCNC: 122 MG/DL (ref 65–117)
GLUCOSE BLD STRIP.AUTO-MCNC: 99 MG/DL (ref 65–117)
SERVICE CMNT-IMP: ABNORMAL
SERVICE CMNT-IMP: NORMAL

## 2022-05-21 PROCEDURE — 74011250636 HC RX REV CODE- 250/636: Performed by: INTERNAL MEDICINE

## 2022-05-21 PROCEDURE — 74011250637 HC RX REV CODE- 250/637: Performed by: INTERNAL MEDICINE

## 2022-05-21 PROCEDURE — 82962 GLUCOSE BLOOD TEST: CPT

## 2022-05-21 PROCEDURE — 65660000001 HC RM ICU INTERMED STEPDOWN

## 2022-05-21 PROCEDURE — 74011250637 HC RX REV CODE- 250/637: Performed by: NURSE PRACTITIONER

## 2022-05-21 PROCEDURE — 74011000250 HC RX REV CODE- 250: Performed by: NURSE PRACTITIONER

## 2022-05-21 PROCEDURE — 74011250637 HC RX REV CODE- 250/637: Performed by: PSYCHIATRY & NEUROLOGY

## 2022-05-21 PROCEDURE — 74011250637 HC RX REV CODE- 250/637: Performed by: HOSPITALIST

## 2022-05-21 RX ORDER — HYDRALAZINE HYDROCHLORIDE 20 MG/ML
10 INJECTION INTRAMUSCULAR; INTRAVENOUS
Status: DISCONTINUED | OUTPATIENT
Start: 2022-05-21 | End: 2022-05-24 | Stop reason: HOSPADM

## 2022-05-21 RX ADMIN — TIMOLOL MALEATE 1 DROP: 5 SOLUTION OPHTHALMIC at 18:13

## 2022-05-21 RX ADMIN — LEVETIRACETAM 250 MG: 250 TABLET, FILM COATED ORAL at 09:18

## 2022-05-21 RX ADMIN — DIBASIC SODIUM PHOSPHATE, MONOBASIC POTASSIUM PHOSPHATE AND MONOBASIC SODIUM PHOSPHATE 1 TABLET: 852; 155; 130 TABLET ORAL at 18:13

## 2022-05-21 RX ADMIN — HEPARIN SODIUM 5000 UNITS: 5000 INJECTION INTRAVENOUS; SUBCUTANEOUS at 06:09

## 2022-05-21 RX ADMIN — LEVOTHYROXINE SODIUM 112 MCG: 0.11 TABLET ORAL at 09:12

## 2022-05-21 RX ADMIN — ASPIRIN 325 MG ORAL TABLET 325 MG: 325 PILL ORAL at 09:12

## 2022-05-21 RX ADMIN — HEPARIN SODIUM 5000 UNITS: 5000 INJECTION INTRAVENOUS; SUBCUTANEOUS at 21:13

## 2022-05-21 RX ADMIN — TIMOLOL MALEATE 1 DROP: 5 SOLUTION OPHTHALMIC at 09:17

## 2022-05-21 RX ADMIN — BRIMONIDINE TARTRATE 1 DROP: 2 SOLUTION/ DROPS OPHTHALMIC at 18:13

## 2022-05-21 RX ADMIN — SODIUM CHLORIDE, PRESERVATIVE FREE 10 ML: 5 INJECTION INTRAVENOUS at 16:15

## 2022-05-21 RX ADMIN — SODIUM CHLORIDE, PRESERVATIVE FREE 10 ML: 5 INJECTION INTRAVENOUS at 21:14

## 2022-05-21 RX ADMIN — LEVETIRACETAM 250 MG: 250 TABLET, FILM COATED ORAL at 18:13

## 2022-05-21 RX ADMIN — ATORVASTATIN CALCIUM 80 MG: 40 TABLET, FILM COATED ORAL at 09:12

## 2022-05-21 RX ADMIN — LOSARTAN POTASSIUM 100 MG: 50 TABLET, FILM COATED ORAL at 09:12

## 2022-05-21 RX ADMIN — HEPARIN SODIUM 5000 UNITS: 5000 INJECTION INTRAVENOUS; SUBCUTANEOUS at 12:24

## 2022-05-21 RX ADMIN — BRIMONIDINE TARTRATE 1 DROP: 2 SOLUTION/ DROPS OPHTHALMIC at 09:17

## 2022-05-21 RX ADMIN — DIBASIC SODIUM PHOSPHATE, MONOBASIC POTASSIUM PHOSPHATE AND MONOBASIC SODIUM PHOSPHATE 1 TABLET: 852; 155; 130 TABLET ORAL at 09:12

## 2022-05-21 NOTE — PROGRESS NOTES
Problem: Falls - Risk of  Goal: *Absence of Falls  Description: Document [place] Fall Risk and appropriate interventions in the flowsheet.   Outcome: Progressing Towards Goal  Note: Fall Risk Interventions:       Mentation Interventions: Adequate sleep, hydration, pain control,Door open when patient unattended,Evaluate medications/consider consulting pharmacy    Medication Interventions: Evaluate medications/consider consulting pharmacy    Elimination Interventions: Call light in reach,Toileting schedule/hourly rounds              Problem: Patient Education: Go to Patient Education Activity  Goal: Patient/Family Education  Outcome: Progressing Towards Goal     Problem: TIA/CVA Stroke: Day 2 Until Discharge  Goal: Nutrition/Diet  Outcome: Progressing Towards Goal  Goal: Discharge Planning  Outcome: Progressing Towards Goal  Goal: *Absence of aspiration  Outcome: Progressing Towards Goal  Goal: *Tolerating diet  Outcome: Progressing Towards Goal

## 2022-05-21 NOTE — PROGRESS NOTES
[address] [facility] Adult  Hospitalist Group                                                                                          Hospitalist Progress Note  Terry Franz MD  Answering service: [phone] from in house phone        Date of Service:  2022  NAME:  Pat Koch  :  1928  MRN:  [id number]      Admission Summary:     A 80year-old lady presented to ER at [facility] the evening of May 17 with left-sided weakness.  CTA showed right MCA thrombus and underwent thrombectomy early morning on May 18.    : No acute event, cooperative and pleasant improvement in strength on left side, passed swallow eval, on low-dose nicardipine this morning but this is being weaned off.  Follow-up CAT scan did not show complication and transferred out of ICU on     Interval history / Subjective:     Poor historian, she said she feels better, no left side chest pain     Assessment & Plan:     Right MCA ischemic stroke status post thrombectomy early morning on May 18:  -improvement in left-sided weakness.   -Continue aspirin 325 mg and statin.    -started on keppra 250 bid   -Continue neuro check and supportive care   -EEG pending  -continue PT/OT  -Neurointerventional surgeon and Neurologist on board     HTN  -BP normal  -continue losartan, hydralazine and monitor BP  -Discontinued nicardipine gtt  -monitor BP     Hypothyroidism  -TSH normal  -Continue home dose levothyroxine     Glaucoma  -Stable, continue eyedrops     Hx ot T2DM  -A1c 5.8  -continue sliding scale and monitor finger stick glucose     HFpEF  S/p PPM  -compensated, on losartan  -home demadex on hold  -monitor I/O     COPD   -not bronchospastic   -SpO2 % on 2 l/m  -monitor pulse ox    Debility  -continue PT/OT          Code status: Full Code   Prophylaxis: Heparin  Care Plan discussed with:Patient, Nurse and CM  Anticipated Disposition: Inpatient [address] [place] [address] Av Problems  Never Reviewed Codes Class Noted POA    Abnormal EEG ICD-10-CM: R94.01  ICD-9-CM: 794.02  5/19/2022 Unknown        Stroke (cerebrum) [facility]) ICD-10-CM: I63.9  ICD-9-CM: 434.91  5/18/2022 Unknown                 Vital Signs:    Last 24hrs VS reviewed since prior progress note. Most recent are:  Visit Vitals  BP (!) 151/70 (BP 1 Location: Left upper arm, BP Patient Position: At rest)   Pulse 64   Temp 98.6 °F (37 °C)   Resp 24   Wt 82.6 kg (182 lb 1.6 oz)   SpO2 96%   BMI 29.39 kg/m²         Intake/Output Summary (Last 24 hours) at 5/21/2022 1159  Last data filed at 5/20/2022 1802  Gross per 24 hour   Intake 120 ml   Output --   Net 120 ml        Physical Examination:     I had a face to face encounter with this patient and independently examined them on 5/21/2022 as outlined below:          Constitutional:  No acute distress, cooperative, pleasant    ENT:  Oral mucosa moist, oropharynx benign. Resp:  CTA bilaterally. No wheezing/rhonchi/rales. No accessory muscle use. CV:  Regular rhythm, normal rate, no murmurs, gallops, rubs    GI:  Soft, non distended, non tender.  normoactive bowel sounds, no hepatosplenomegaly     Musculoskeletal:  No edema,     Neurologic:  Conscious and alert, oriented to place and person, Moves all extremities, CN II-XII reviewed            Data Review:    Review and/or order of clinical lab test  Review and/or order of tests in the radiology section of CPT  Review and/or order of tests in the medicine section of CPT      Labs:     Recent Labs     05/20/22  1006 05/19/22  0246   WBC 5.1 7.6   HGB 10.9* 10.7*   HCT 35.2 34.7*    169     Recent Labs     05/20/22  1006 05/19/22  0246    141   K 3.9 3.5    103   CO2 35* 37*   BUN 9 7   CREA 0.73 0.72   GLU 99 89   CA 9.5 9.0   MG 1.9 1.8   PHOS 2.9 2.1*     Recent Labs     05/20/22  1006 05/19/22  0246   ALT 15 15   AP 88 86   TBILI 0.6 0.7   TP 6.8 7.1   ALB 2.7* 2.7*   GLOB 4.1* 4.4*     No results for input(s): INR, PTP, APTT, INREXT, INREXT in the last 72 hours. No results for input(s): FE, TIBC, PSAT, FERR in the last 72 hours. No results found for: FOL, RBCF   No results for input(s): PH, PCO2, PO2 in the last 72 hours. No results for input(s): CPK, CKNDX, TROIQ in the last 72 hours.     No lab exists for component: CPKMB  Lab Results   Component Value Date/Time    Cholesterol, total 170 05/18/2022 05:07 AM    HDL Cholesterol 44 05/18/2022 05:07 AM    LDL, calculated 111.8 (H) 05/18/2022 05:07 AM    Triglyceride 71 05/18/2022 05:07 AM    CHOL/HDL Ratio 3.9 05/18/2022 05:07 AM     Lab Results   Component Value Date/Time    Glucose (POC) 109 05/21/2022 08:12 AM    Glucose (POC) 87 05/20/2022 04:40 PM    Glucose (POC) 121 (H) 05/20/2022 12:03 PM    Glucose (POC) 80 05/20/2022 06:29 AM    Glucose (POC) 81 05/20/2022 01:49 AM     Lab Results   Component Value Date/Time    Color YELLOW 03/28/2012 07:30 PM    Appearance CLEAR 03/28/2012 07:30 PM    Specific gravity 1.012 03/28/2012 07:30 PM    Specific gravity 1.015 03/15/2012 07:15 PM    pH (UA) 6.0 03/28/2012 07:30 PM    Protein NEGATIVE  03/28/2012 07:30 PM    Glucose NEGATIVE  03/28/2012 07:30 PM    Ketone NEGATIVE  03/28/2012 07:30 PM    Bilirubin NEGATIVE  03/28/2012 07:30 PM    Urobilinogen 1.0 03/28/2012 07:30 PM    Nitrites NEGATIVE  03/28/2012 07:30 PM    Leukocyte Esterase NEGATIVE  03/28/2012 07:30 PM    Epithelial cells 0-5 03/28/2012 07:30 PM    Bacteria NEGATIVE  03/28/2012 07:30 PM    WBC 0-4 03/28/2012 07:30 PM    RBC 0-3 03/28/2012 07:30 PM         Medications Reviewed:     Current Facility-Administered Medications   Medication Dose Route Frequency    phosphorus (K PHOS NEUTRAL) 250 mg tablet 1 Tablet  1 Tablet Oral BID    losartan (COZAAR) tablet 100 mg  100 mg Oral DAILY    atorvastatin (LIPITOR) tablet 80 mg  80 mg Oral DAILY    aspirin tablet 325 mg  325 mg Oral DAILY    heparin (porcine) injection 5,000 Units  5,000 Units SubCUTAneous Q8H    insulin lispro (HUMALOG) injection   SubCUTAneous AC&HS    levETIRAcetam (KEPPRA) tablet 250 mg  250 mg Oral BID    acetaminophen (TYLENOL) tablet 650 mg  650 mg Oral Q4H PRN    Or    acetaminophen (TYLENOL) solution 650 mg  650 mg Per NG tube Q4H PRN    Or    acetaminophen (TYLENOL) suppository 650 mg  650 mg Rectal Q4H PRN    sodium chloride (NS) flush 5-40 mL  5-40 mL IntraVENous Q8H    sodium chloride (NS) flush 5-40 mL  5-40 mL IntraVENous PRN    glucose chewable tablet 16 g  4 Tablet Oral PRN    dextrose 10 % infusion 0-250 mL  0-250 mL IntraVENous PRN    glucagon (GLUCAGEN) injection 1 mg  1 mg IntraMUSCular PRN    levothyroxine (SYNTHROID) tablet 112 mcg  112 mcg Oral ACB    brimonidine (ALPHAGAN) 0.2 % ophthalmic solution 1 Drop  1 Drop Both Eyes BID    And    timolol (TIMOPTIC) 0.5 % ophthalmic solution 1 Drop  1 Drop Both Eyes BID     ______________________________________________________________________  EXPECTED LENGTH OF STAY: 4d 2h  ACTUAL LENGTH OF STAY:          3                 Princess Davy MD

## 2022-05-22 ENCOUNTER — APPOINTMENT (OUTPATIENT)
Dept: NON INVASIVE DIAGNOSTICS | Age: 87
DRG: 024 | End: 2022-05-22
Attending: NURSE PRACTITIONER
Payer: MEDICARE

## 2022-05-22 LAB
ALBUMIN SERPL-MCNC: 2.8 G/DL (ref 3.5–5)
ALBUMIN/GLOB SERPL: 0.7 {RATIO} (ref 1.1–2.2)
ALP SERPL-CCNC: 90 U/L (ref 45–117)
ALT SERPL-CCNC: 17 U/L (ref 12–78)
ANION GAP SERPL CALC-SCNC: 2 MMOL/L (ref 5–15)
AST SERPL-CCNC: 25 U/L (ref 15–37)
BILIRUB SERPL-MCNC: 0.8 MG/DL (ref 0.2–1)
BUN SERPL-MCNC: 12 MG/DL (ref 6–20)
BUN/CREAT SERPL: 16 (ref 12–20)
CALCIUM SERPL-MCNC: 9.3 MG/DL (ref 8.5–10.1)
CHLORIDE SERPL-SCNC: 107 MMOL/L (ref 97–108)
CO2 SERPL-SCNC: 37 MMOL/L (ref 21–32)
CREAT SERPL-MCNC: 0.76 MG/DL (ref 0.55–1.02)
ERYTHROCYTE [DISTWIDTH] IN BLOOD BY AUTOMATED COUNT: 13.5 % (ref 11.5–14.5)
GLOBULIN SER CALC-MCNC: 4.1 G/DL (ref 2–4)
GLUCOSE BLD STRIP.AUTO-MCNC: 109 MG/DL (ref 65–117)
GLUCOSE BLD STRIP.AUTO-MCNC: 112 MG/DL (ref 65–117)
GLUCOSE BLD STRIP.AUTO-MCNC: 86 MG/DL (ref 65–117)
GLUCOSE BLD STRIP.AUTO-MCNC: 93 MG/DL (ref 65–117)
GLUCOSE SERPL-MCNC: 107 MG/DL (ref 65–100)
HCT VFR BLD AUTO: 33.5 % (ref 35–47)
HGB BLD-MCNC: 10.4 G/DL (ref 11.5–16)
MAGNESIUM SERPL-MCNC: 2 MG/DL (ref 1.6–2.4)
MCH RBC QN AUTO: 28.6 PG (ref 26–34)
MCHC RBC AUTO-ENTMCNC: 31 G/DL (ref 30–36.5)
MCV RBC AUTO: 92 FL (ref 80–99)
NRBC # BLD: 0 K/UL (ref 0–0.01)
NRBC BLD-RTO: 0 PER 100 WBC
PHOSPHATE SERPL-MCNC: 2.7 MG/DL (ref 2.6–4.7)
PLATELET # BLD AUTO: 188 K/UL (ref 150–400)
PMV BLD AUTO: 10.7 FL (ref 8.9–12.9)
POTASSIUM SERPL-SCNC: 3.4 MMOL/L (ref 3.5–5.1)
PROT SERPL-MCNC: 6.9 G/DL (ref 6.4–8.2)
RBC # BLD AUTO: 3.64 M/UL (ref 3.8–5.2)
SERVICE CMNT-IMP: NORMAL
SODIUM SERPL-SCNC: 146 MMOL/L (ref 136–145)
WBC # BLD AUTO: 5.9 K/UL (ref 3.6–11)

## 2022-05-22 PROCEDURE — 74011250636 HC RX REV CODE- 250/636: Performed by: INTERNAL MEDICINE

## 2022-05-22 PROCEDURE — 74011250636 HC RX REV CODE- 250/636: Performed by: NURSE PRACTITIONER

## 2022-05-22 PROCEDURE — 82962 GLUCOSE BLOOD TEST: CPT

## 2022-05-22 PROCEDURE — 74011000250 HC RX REV CODE- 250: Performed by: NURSE PRACTITIONER

## 2022-05-22 PROCEDURE — 74011250637 HC RX REV CODE- 250/637: Performed by: NURSE PRACTITIONER

## 2022-05-22 PROCEDURE — 74011250637 HC RX REV CODE- 250/637: Performed by: HOSPITALIST

## 2022-05-22 PROCEDURE — 74011250637 HC RX REV CODE- 250/637: Performed by: PSYCHIATRY & NEUROLOGY

## 2022-05-22 PROCEDURE — 74011250637 HC RX REV CODE- 250/637: Performed by: INTERNAL MEDICINE

## 2022-05-22 PROCEDURE — 85027 COMPLETE CBC AUTOMATED: CPT

## 2022-05-22 PROCEDURE — 80053 COMPREHEN METABOLIC PANEL: CPT

## 2022-05-22 PROCEDURE — 84100 ASSAY OF PHOSPHORUS: CPT

## 2022-05-22 PROCEDURE — 65660000001 HC RM ICU INTERMED STEPDOWN

## 2022-05-22 PROCEDURE — 83735 ASSAY OF MAGNESIUM: CPT

## 2022-05-22 PROCEDURE — 36415 COLL VENOUS BLD VENIPUNCTURE: CPT

## 2022-05-22 RX ORDER — AMLODIPINE BESYLATE 5 MG/1
5 TABLET ORAL DAILY
Status: DISCONTINUED | OUTPATIENT
Start: 2022-05-22 | End: 2022-05-23

## 2022-05-22 RX ADMIN — HYDRALAZINE HYDROCHLORIDE 10 MG: 20 INJECTION, SOLUTION INTRAMUSCULAR; INTRAVENOUS at 05:00

## 2022-05-22 RX ADMIN — TIMOLOL MALEATE 1 DROP: 5 SOLUTION OPHTHALMIC at 17:55

## 2022-05-22 RX ADMIN — SODIUM CHLORIDE, PRESERVATIVE FREE 10 ML: 5 INJECTION INTRAVENOUS at 05:01

## 2022-05-22 RX ADMIN — BRIMONIDINE TARTRATE 1 DROP: 2 SOLUTION/ DROPS OPHTHALMIC at 08:55

## 2022-05-22 RX ADMIN — LOSARTAN POTASSIUM 100 MG: 50 TABLET, FILM COATED ORAL at 08:55

## 2022-05-22 RX ADMIN — LEVETIRACETAM 250 MG: 250 TABLET, FILM COATED ORAL at 17:55

## 2022-05-22 RX ADMIN — AMLODIPINE BESYLATE 5 MG: 5 TABLET ORAL at 08:55

## 2022-05-22 RX ADMIN — SODIUM CHLORIDE, PRESERVATIVE FREE 10 ML: 5 INJECTION INTRAVENOUS at 14:31

## 2022-05-22 RX ADMIN — SODIUM CHLORIDE, PRESERVATIVE FREE 10 ML: 5 INJECTION INTRAVENOUS at 21:26

## 2022-05-22 RX ADMIN — BRIMONIDINE TARTRATE 1 DROP: 2 SOLUTION/ DROPS OPHTHALMIC at 17:55

## 2022-05-22 RX ADMIN — TIMOLOL MALEATE 1 DROP: 5 SOLUTION OPHTHALMIC at 08:56

## 2022-05-22 RX ADMIN — LEVETIRACETAM 250 MG: 250 TABLET, FILM COATED ORAL at 08:55

## 2022-05-22 RX ADMIN — HEPARIN SODIUM 5000 UNITS: 5000 INJECTION INTRAVENOUS; SUBCUTANEOUS at 21:25

## 2022-05-22 RX ADMIN — ASPIRIN 325 MG ORAL TABLET 325 MG: 325 PILL ORAL at 08:55

## 2022-05-22 RX ADMIN — HEPARIN SODIUM 5000 UNITS: 5000 INJECTION INTRAVENOUS; SUBCUTANEOUS at 03:46

## 2022-05-22 RX ADMIN — HEPARIN SODIUM 5000 UNITS: 5000 INJECTION INTRAVENOUS; SUBCUTANEOUS at 14:30

## 2022-05-22 RX ADMIN — ATORVASTATIN CALCIUM 80 MG: 40 TABLET, FILM COATED ORAL at 08:55

## 2022-05-22 RX ADMIN — LEVOTHYROXINE SODIUM 112 MCG: 0.11 TABLET ORAL at 06:36

## 2022-05-22 NOTE — PROGRESS NOTES
Transition of Care  1. RUR 13% Low  2. Disposition [facility]  3. DME Walker  4. Transportation BLS  5. Follow Up PCP, Specialist      [facility] has accepted patient for admission for Monday or Tuesday. Primary CM team to follow up.      Aparna Hou MS

## 2022-05-22 NOTE — PROGRESS NOTES
[address] [facility] Adult  Hospitalist Group                                                                                          Hospitalist Progress Note  Tiana Xavier MD  Answering service: [phone] from in house phone        Date of Service:  2022  NAME:  Suman Lange  :  1928  MRN:  [id number]      Admission Summary:     A 77-year-old lady presented to ER at [facility] the evening of May 17 with left-sided weakness.  CTA showed right MCA thrombus and underwent thrombectomy early morning on May 18.    : No acute event, cooperative and pleasant improvement in strength on left side, passed swallow eval, on low-dose nicardipine this morning but this is being weaned off.  Follow-up CAT scan did not show complication and transferred out of ICU on     Interval history / Subjective:     Poor historian, she said she feels better, no left side chest pain     Assessment & Plan:     Right MCA ischemic stroke status post thrombectomy early morning on May 18:  -improvement in left-sided weakness.   -Continue aspirin 325 mg and statin.    -started on keppra 250 bid   -Continue neuro check and supportive care   -EEG pending  -continue PT/OT  -Neurointerventional surgeon and Neurologist on board     HTN  -BP fluctuating, not at goal  -on norvasc, losartan, hydralazine and monitor BP  -Discontinued nicardipine gtt  -monitor BP     Hypothyroidism  -TSH normal  -Continue home dose levothyroxine     Glaucoma  -Stable, continue eyedrops     Hx ot T2DM  -A1c 5.8  -continue sliding scale and monitor finger stick glucose     HFpEF  S/p PPM  -compensated, on losartan  -home demadex on hold  -monitor I/O     COPD   -not bronchospastic   -SpO2 % on 2 l/m  -monitor pulse ox    Debility  -continue PT/OT          Code status: Full Code   Prophylaxis: Heparin  Care Plan discussed with:Patient, Nurse and CM  Anticipated Disposition: Inpatient [facility] Problems  Never Reviewed          Codes Class Noted POA    Abnormal EEG ICD-10-CM: R94.01  ICD-9-CM: 794.02  5/19/2022 Unknown        Stroke (cerebrum) [facility]) ICD-10-CM: I63.9  ICD-9-CM: 434.91  5/18/2022 Unknown                 Vital Signs:    Last 24hrs VS reviewed since prior progress note. Most recent are:  Visit Vitals  BP (!) 171/96   Pulse 78   Temp 98 °F (36.7 °C)   Resp 21   Wt 82.6 kg (182 lb 1.6 oz)   SpO2 94%   BMI 29.39 kg/m²         Intake/Output Summary (Last 24 hours) at 5/22/2022 1248  Last data filed at 5/22/2022 0400  Gross per 24 hour   Intake --   Output 650 ml   Net -650 ml        Physical Examination:     I had a face to face encounter with this patient and independently examined them on 5/22/2022 as outlined below:          Constitutional:  No acute distress, cooperative, pleasant    ENT:  Oral mucosa moist, oropharynx benign. Resp:  CTA bilaterally. No wheezing/rhonchi/rales. No accessory muscle use. CV:  Regular rhythm, normal rate, no murmurs, gallops, rubs    GI:  Soft, non distended, non tender.  normoactive bowel sounds, no hepatosplenomegaly     Musculoskeletal:  No edema,     Neurologic:  Conscious and alert, oriented to place and person, Moves all extremities, CN II-XII reviewed            Data Review:    Review and/or order of clinical lab test  Review and/or order of tests in the radiology section of CPT  Review and/or order of tests in the medicine section of CPT      Labs:     Recent Labs     05/22/22  0106 05/20/22  1006   WBC 5.9 5.1   HGB 10.4* 10.9*   HCT 33.5* 35.2    178     Recent Labs     05/22/22  0106 05/20/22  1006   * 144   K 3.4* 3.9    107   CO2 37* 35*   BUN 12 9   CREA 0.76 0.73   * 99   CA 9.3 9.5   MG 2.0 1.9   PHOS 2.7 2.9     Recent Labs     05/22/22  0106 05/20/22  1006   ALT 17 15   AP 90 88   TBILI 0.8 0.6   TP 6.9 6.8   ALB 2.8* 2.7*   GLOB 4.1* 4.1*     No results for input(s): INR, PTP, APTT, INREXT, INREXT in the last 72 hours. No results for input(s): FE, TIBC, PSAT, FERR in the last 72 hours. No results found for: FOL, RBCF   No results for input(s): PH, PCO2, PO2 in the last 72 hours. No results for input(s): CPK, CKNDX, TROIQ in the last 72 hours.     No lab exists for component: CPKMB  Lab Results   Component Value Date/Time    Cholesterol, total 170 05/18/2022 05:07 AM    HDL Cholesterol 44 05/18/2022 05:07 AM    LDL, calculated 111.8 (H) 05/18/2022 05:07 AM    Triglyceride 71 05/18/2022 05:07 AM    CHOL/HDL Ratio 3.9 05/18/2022 05:07 AM     Lab Results   Component Value Date/Time    Glucose (POC) 109 05/22/2022 11:36 AM    Glucose (POC) 112 05/22/2022 06:38 AM    Glucose (POC) 122 (H) 05/21/2022 09:11 PM    Glucose (POC) 109 05/21/2022 04:28 PM    Glucose (POC) 99 05/21/2022 12:01 PM     Lab Results   Component Value Date/Time    Color YELLOW 03/28/2012 07:30 PM    Appearance CLEAR 03/28/2012 07:30 PM    Specific gravity 1.012 03/28/2012 07:30 PM    Specific gravity 1.015 03/15/2012 07:15 PM    pH (UA) 6.0 03/28/2012 07:30 PM    Protein NEGATIVE  03/28/2012 07:30 PM    Glucose NEGATIVE  03/28/2012 07:30 PM    Ketone NEGATIVE  03/28/2012 07:30 PM    Bilirubin NEGATIVE  03/28/2012 07:30 PM    Urobilinogen 1.0 03/28/2012 07:30 PM    Nitrites NEGATIVE  03/28/2012 07:30 PM    Leukocyte Esterase NEGATIVE  03/28/2012 07:30 PM    Epithelial cells 0-5 03/28/2012 07:30 PM    Bacteria NEGATIVE  03/28/2012 07:30 PM    WBC 0-4 03/28/2012 07:30 PM    RBC 0-3 03/28/2012 07:30 PM         Medications Reviewed:     Current Facility-Administered Medications   Medication Dose Route Frequency    amLODIPine (NORVASC) tablet 5 mg  5 mg Oral DAILY    hydrALAZINE (APRESOLINE) 20 mg/mL injection 10 mg  10 mg IntraVENous Q6H PRN    losartan (COZAAR) tablet 100 mg  100 mg Oral DAILY    atorvastatin (LIPITOR) tablet 80 mg  80 mg Oral DAILY    aspirin tablet 325 mg  325 mg Oral DAILY    heparin (porcine) injection 5,000 Units  5,000 Units SubCUTAneous Q8H    insulin lispro (HUMALOG) injection   SubCUTAneous AC&HS    levETIRAcetam (KEPPRA) tablet 250 mg  250 mg Oral BID    acetaminophen (TYLENOL) tablet 650 mg  650 mg Oral Q4H PRN    Or    acetaminophen (TYLENOL) solution 650 mg  650 mg Per NG tube Q4H PRN    Or    acetaminophen (TYLENOL) suppository 650 mg  650 mg Rectal Q4H PRN    sodium chloride (NS) flush 5-40 mL  5-40 mL IntraVENous Q8H    sodium chloride (NS) flush 5-40 mL  5-40 mL IntraVENous PRN    glucose chewable tablet 16 g  4 Tablet Oral PRN    dextrose 10 % infusion 0-250 mL  0-250 mL IntraVENous PRN    glucagon (GLUCAGEN) injection 1 mg  1 mg IntraMUSCular PRN    levothyroxine (SYNTHROID) tablet 112 mcg  112 mcg Oral ACB    brimonidine (ALPHAGAN) 0.2 % ophthalmic solution 1 Drop  1 Drop Both Eyes BID    And    timolol (TIMOPTIC) 0.5 % ophthalmic solution 1 Drop  1 Drop Both Eyes BID     ______________________________________________________________________  EXPECTED LENGTH OF STAY: 4d 2h  ACTUAL LENGTH OF STAY:          4                 Shantel Bean MD

## 2022-05-22 NOTE — PROGRESS NOTES
0000: Bedside shift change report given to Cris Meredith RN (oncoming nurse) by Wendolyn Sicard, RN (offgoing nurse). Report included the following information SBAR, Kardex, Intake/Output, MAR, Recent Results, Cardiac Rhythm A paced and Alarm Parameters . 0730: Bedside shift change report given to Chyna Wallis RN (oncoming nurse) by Cris Meredith RN (offgoing nurse). Report included the following information SBAR, Kardex, Intake/Output, MAR, Recent Results, Cardiac Rhythm A paced and Alarm Parameters .

## 2022-05-23 ENCOUNTER — APPOINTMENT (OUTPATIENT)
Dept: NON INVASIVE DIAGNOSTICS | Age: 87
DRG: 024 | End: 2022-05-23
Attending: NURSE PRACTITIONER
Payer: MEDICARE

## 2022-05-23 VITALS
HEART RATE: 66 BPM | OXYGEN SATURATION: 99 % | RESPIRATION RATE: 20 BRPM | BODY MASS INDEX: 29.76 KG/M2 | HEIGHT: 66 IN | SYSTOLIC BLOOD PRESSURE: 163 MMHG | WEIGHT: 185.19 LBS | DIASTOLIC BLOOD PRESSURE: 68 MMHG | TEMPERATURE: 98.6 F

## 2022-05-23 LAB
ALBUMIN SERPL-MCNC: 2.8 G/DL (ref 3.5–5)
ALBUMIN/GLOB SERPL: 0.7 {RATIO} (ref 1.1–2.2)
ALP SERPL-CCNC: 90 U/L (ref 45–117)
ALT SERPL-CCNC: 21 U/L (ref 12–78)
ANION GAP SERPL CALC-SCNC: 2 MMOL/L (ref 5–15)
AST SERPL-CCNC: 37 U/L (ref 15–37)
BILIRUB SERPL-MCNC: 0.7 MG/DL (ref 0.2–1)
BUN SERPL-MCNC: 9 MG/DL (ref 6–20)
BUN/CREAT SERPL: 13 (ref 12–20)
CALCIUM SERPL-MCNC: 9.9 MG/DL (ref 8.5–10.1)
CHLORIDE SERPL-SCNC: 110 MMOL/L (ref 97–108)
CO2 SERPL-SCNC: 33 MMOL/L (ref 21–32)
CREAT SERPL-MCNC: 0.72 MG/DL (ref 0.55–1.02)
ECHO AO ROOT DIAM: 3.3 CM
ECHO AO ROOT INDEX: 1.7 CM/M2
ECHO AV MEAN GRADIENT: 8 MMHG
ECHO AV MEAN VELOCITY: 1.3 M/S
ECHO AV PEAK GRADIENT: 18 MMHG
ECHO AV PEAK GRADIENT: 19 MMHG
ECHO AV PEAK VELOCITY: 2.1 M/S
ECHO AV PEAK VELOCITY: 2.2 M/S
ECHO AV VTI: 41.5 CM
ECHO LA DIAMETER INDEX: 1.6 CM/M2
ECHO LA DIAMETER: 3.1 CM
ECHO LA TO AORTIC ROOT RATIO: 0.94
ECHO LV E' LATERAL VELOCITY: 6 CM/S
ECHO LV E' SEPTAL VELOCITY: 4 CM/S
ECHO LV FRACTIONAL SHORTENING: 29 % (ref 28–44)
ECHO LV INTERNAL DIMENSION DIASTOLE INDEX: 1.8 CM/M2
ECHO LV INTERNAL DIMENSION DIASTOLIC: 3.5 CM (ref 3.9–5.3)
ECHO LV INTERNAL DIMENSION SYSTOLIC INDEX: 1.29 CM/M2
ECHO LV INTERNAL DIMENSION SYSTOLIC: 2.5 CM
ECHO LV IVSD: 1.2 CM (ref 0.6–0.9)
ECHO LV MASS 2D: 153.8 G (ref 67–162)
ECHO LV MASS INDEX 2D: 79.3 G/M2 (ref 43–95)
ECHO LV POSTERIOR WALL DIASTOLIC: 1.4 CM (ref 0.6–0.9)
ECHO LV RELATIVE WALL THICKNESS RATIO: 0.8
ECHO LVOT AV VTI INDEX: 0.61
ECHO LVOT MEAN GRADIENT: 3 MMHG
ECHO LVOT PEAK GRADIENT: 6 MMHG
ECHO LVOT PEAK VELOCITY: 1.2 M/S
ECHO LVOT VTI: 25.5 CM
ECHO MV A VELOCITY: 0.79 M/S
ECHO MV AREA PHT: 3 CM2
ECHO MV E DECELERATION TIME (DT): 249.8 MS
ECHO MV E VELOCITY: 0.52 M/S
ECHO MV E/A RATIO: 0.66
ECHO MV E/E' LATERAL: 8.67
ECHO MV E/E' RATIO (AVERAGED): 10.83
ECHO MV E/E' SEPTAL: 13
ECHO MV PRESSURE HALF TIME (PHT): 72.5 MS
ECHO PULMONARY ARTERY SYSTOLIC PRESSURE (PASP): 86 MMHG
ECHO PV MAX VELOCITY: 1 M/S
ECHO PV PEAK GRADIENT: 4 MMHG
ECHO RV TAPSE: 2.3 CM (ref 1.7–?)
ECHO TV REGURGITANT MAX VELOCITY: 4.8 M/S
ECHO TV REGURGITANT PEAK GRADIENT: 92 MMHG
ERYTHROCYTE [DISTWIDTH] IN BLOOD BY AUTOMATED COUNT: 13.9 % (ref 11.5–14.5)
GLOBULIN SER CALC-MCNC: 4.2 G/DL (ref 2–4)
GLUCOSE BLD STRIP.AUTO-MCNC: 149 MG/DL (ref 65–117)
GLUCOSE BLD STRIP.AUTO-MCNC: 79 MG/DL (ref 65–117)
GLUCOSE BLD STRIP.AUTO-MCNC: 86 MG/DL (ref 65–117)
GLUCOSE BLD STRIP.AUTO-MCNC: 93 MG/DL (ref 65–117)
GLUCOSE SERPL-MCNC: 95 MG/DL (ref 65–100)
HCT VFR BLD AUTO: 35.9 % (ref 35–47)
HGB BLD-MCNC: 11 G/DL (ref 11.5–16)
MAGNESIUM SERPL-MCNC: 2.1 MG/DL (ref 1.6–2.4)
MCH RBC QN AUTO: 28.4 PG (ref 26–34)
MCHC RBC AUTO-ENTMCNC: 30.6 G/DL (ref 30–36.5)
MCV RBC AUTO: 92.8 FL (ref 80–99)
NRBC # BLD: 0 K/UL (ref 0–0.01)
NRBC BLD-RTO: 0 PER 100 WBC
PHOSPHATE SERPL-MCNC: 2.6 MG/DL (ref 2.6–4.7)
PLATELET # BLD AUTO: 191 K/UL (ref 150–400)
PMV BLD AUTO: 10.8 FL (ref 8.9–12.9)
POTASSIUM SERPL-SCNC: 4 MMOL/L (ref 3.5–5.1)
PROT SERPL-MCNC: 7 G/DL (ref 6.4–8.2)
RBC # BLD AUTO: 3.87 M/UL (ref 3.8–5.2)
SERVICE CMNT-IMP: ABNORMAL
SERVICE CMNT-IMP: NORMAL
SODIUM SERPL-SCNC: 145 MMOL/L (ref 136–145)
WBC # BLD AUTO: 7 K/UL (ref 3.6–11)

## 2022-05-23 PROCEDURE — 74011250637 HC RX REV CODE- 250/637: Performed by: INTERNAL MEDICINE

## 2022-05-23 PROCEDURE — 74011250636 HC RX REV CODE- 250/636: Performed by: NURSE PRACTITIONER

## 2022-05-23 PROCEDURE — 36415 COLL VENOUS BLD VENIPUNCTURE: CPT

## 2022-05-23 PROCEDURE — 85027 COMPLETE CBC AUTOMATED: CPT

## 2022-05-23 PROCEDURE — 74011250636 HC RX REV CODE- 250/636: Performed by: INTERNAL MEDICINE

## 2022-05-23 PROCEDURE — 74011250637 HC RX REV CODE- 250/637: Performed by: NURSE PRACTITIONER

## 2022-05-23 PROCEDURE — 74011250637 HC RX REV CODE- 250/637: Performed by: PSYCHIATRY & NEUROLOGY

## 2022-05-23 PROCEDURE — 97535 SELF CARE MNGMENT TRAINING: CPT | Performed by: OCCUPATIONAL THERAPIST

## 2022-05-23 PROCEDURE — 92526 ORAL FUNCTION THERAPY: CPT

## 2022-05-23 PROCEDURE — 74011636637 HC RX REV CODE- 636/637: Performed by: HOSPITALIST

## 2022-05-23 PROCEDURE — 74011250637 HC RX REV CODE- 250/637: Performed by: HOSPITALIST

## 2022-05-23 PROCEDURE — 74011000250 HC RX REV CODE- 250: Performed by: NURSE PRACTITIONER

## 2022-05-23 PROCEDURE — 82962 GLUCOSE BLOOD TEST: CPT

## 2022-05-23 PROCEDURE — 80053 COMPREHEN METABOLIC PANEL: CPT

## 2022-05-23 PROCEDURE — 84100 ASSAY OF PHOSPHORUS: CPT

## 2022-05-23 PROCEDURE — 93306 TTE W/DOPPLER COMPLETE: CPT | Performed by: SPECIALIST

## 2022-05-23 PROCEDURE — 83735 ASSAY OF MAGNESIUM: CPT

## 2022-05-23 PROCEDURE — 93306 TTE W/DOPPLER COMPLETE: CPT

## 2022-05-23 RX ORDER — LEVETIRACETAM 250 MG/1
250 TABLET ORAL 2 TIMES DAILY
Qty: 60 TABLET | Refills: 0 | Status: ON HOLD | OUTPATIENT
Start: 2022-05-23 | End: 2022-09-26

## 2022-05-23 RX ORDER — TIMOLOL MALEATE 5 MG/ML
1 SOLUTION/ DROPS OPHTHALMIC 2 TIMES DAILY
Qty: 10 ML | Refills: 0 | Status: SHIPPED | OUTPATIENT
Start: 2022-05-23

## 2022-05-23 RX ORDER — LEVOTHYROXINE SODIUM 112 UG/1
112 TABLET ORAL
Qty: 30 TABLET | Refills: 0 | Status: ON HOLD | OUTPATIENT
Start: 2022-05-24 | End: 2022-09-26

## 2022-05-23 RX ORDER — AMLODIPINE BESYLATE 5 MG/1
10 TABLET ORAL DAILY
Status: DISCONTINUED | OUTPATIENT
Start: 2022-05-24 | End: 2022-05-24 | Stop reason: HOSPADM

## 2022-05-23 RX ORDER — ASPIRIN 325 MG
325 TABLET ORAL DAILY
Qty: 30 TABLET | Refills: 0 | Status: ON HOLD | OUTPATIENT
Start: 2022-05-24 | End: 2022-09-26

## 2022-05-23 RX ORDER — AMLODIPINE BESYLATE 10 MG/1
10 TABLET ORAL DAILY
Qty: 30 TABLET | Refills: 0 | Status: ON HOLD | OUTPATIENT
Start: 2022-05-24 | End: 2022-09-26

## 2022-05-23 RX ORDER — ACETAMINOPHEN 325 MG/1
650 TABLET ORAL
Qty: 30 TABLET | Refills: 0 | Status: SHIPPED
Start: 2022-05-23 | End: 2022-09-29

## 2022-05-23 RX ORDER — ATORVASTATIN CALCIUM 80 MG/1
80 TABLET, FILM COATED ORAL DAILY
Qty: 30 TABLET | Refills: 0 | Status: ON HOLD | OUTPATIENT
Start: 2022-05-24 | End: 2022-09-29 | Stop reason: SDUPTHER

## 2022-05-23 RX ADMIN — BRIMONIDINE TARTRATE 1 DROP: 2 SOLUTION/ DROPS OPHTHALMIC at 18:32

## 2022-05-23 RX ADMIN — Medication 2 UNITS: at 12:13

## 2022-05-23 RX ADMIN — LEVOTHYROXINE SODIUM 112 MCG: 0.11 TABLET ORAL at 06:31

## 2022-05-23 RX ADMIN — HYDRALAZINE HYDROCHLORIDE 10 MG: 20 INJECTION, SOLUTION INTRAMUSCULAR; INTRAVENOUS at 06:31

## 2022-05-23 RX ADMIN — LEVETIRACETAM 250 MG: 250 TABLET, FILM COATED ORAL at 09:47

## 2022-05-23 RX ADMIN — SODIUM CHLORIDE, PRESERVATIVE FREE 10 ML: 5 INJECTION INTRAVENOUS at 14:00

## 2022-05-23 RX ADMIN — ASPIRIN 325 MG ORAL TABLET 325 MG: 325 PILL ORAL at 09:46

## 2022-05-23 RX ADMIN — LOSARTAN POTASSIUM 100 MG: 50 TABLET, FILM COATED ORAL at 09:50

## 2022-05-23 RX ADMIN — HEPARIN SODIUM 5000 UNITS: 5000 INJECTION INTRAVENOUS; SUBCUTANEOUS at 03:03

## 2022-05-23 RX ADMIN — BRIMONIDINE TARTRATE 1 DROP: 2 SOLUTION/ DROPS OPHTHALMIC at 09:54

## 2022-05-23 RX ADMIN — AMLODIPINE BESYLATE 5 MG: 5 TABLET ORAL at 09:50

## 2022-05-23 RX ADMIN — SODIUM CHLORIDE, PRESERVATIVE FREE 10 ML: 5 INJECTION INTRAVENOUS at 06:31

## 2022-05-23 RX ADMIN — HEPARIN SODIUM 5000 UNITS: 5000 INJECTION INTRAVENOUS; SUBCUTANEOUS at 12:14

## 2022-05-23 RX ADMIN — ATORVASTATIN CALCIUM 80 MG: 40 TABLET, FILM COATED ORAL at 09:47

## 2022-05-23 RX ADMIN — TIMOLOL MALEATE 1 DROP: 5 SOLUTION OPHTHALMIC at 09:54

## 2022-05-23 RX ADMIN — LEVETIRACETAM 250 MG: 250 TABLET, FILM COATED ORAL at 18:00

## 2022-05-23 RX ADMIN — TIMOLOL MALEATE 1 DROP: 5 SOLUTION OPHTHALMIC at 18:32

## 2022-05-23 NOTE — DISCHARGE SUMMARY
Discharge Summary       PATIENT ID: Jayden Kelsey  MRN: [id number]   YOB: 1928    DATE OF ADMISSION: 5/18/2022 12:51 AM    DATE OF DISCHARGE: 5/23/2022   PRIMARY CARE PROVIDER: Mary Irby MD     ATTENDING PHYSICIAN: Luci Yates MD   DISCHARGING PROVIDER: Ivy Ramirez MD    To contact this individual call [phone] and ask the  to page. If unavailable ask to be transferred the Adult Hospitalist Department. CONSULTATIONS: IP CONSULT TO NEUROLOGY  IP CONSULT TO INTERVENTIONAL RADIOLOGY  IP CONSULT TO PALLIATIVE CARE - PROVIDER    PROCEDURES/SURGERIES: * No surgery found *    ADMISSION SUMMARY AND HOSPITAL COURSE:     [address] lady presented to ER at [facility] the evening of May 17 with left-sided weakness.  CTA showed right MCA thrombus and underwent thrombectomy early morning on May 18.     5/18: No acute event, cooperative and pleasant improvement in strength on left side, passed swallow eval, on low-dose nicardipine this morning but this is being weaned off.  Follow-up CAT scan did not show complication and transferred out of ICU on 5/19     Right MCA ischemic stroke status post thrombectomy early morning on May 18:  -improvement in left-sided weakness.   -Continue aspirin 325 mg and statin.    -started on keppra 250 bid   -Continue neuro check and supportive care   -EEG Abnormal EEG due to mild slowing seen in the right central temporal region along with a rare sharply contoured slow waveform.  -continue PT/OT  -Neurointerventional surgeon and Neurologist on board  HTN  -BP fairly controled  -on norvasc, losartan, hydralazine and monitor BP  -Discontinued nicardipine gtt  -monitor BP  Hypothyroidism  -TSH normal  -Continue home dose levothyroxine   Glaucoma  -Stable, continue eyedrops  Hx ot T2DM  -A1c 5.8  -continue sliding scale and monitor finger stick glucose  HFpEF  S/p PPM  -compensated, on losartan  -home demadex on hold  -monitor I/O   COPD   -not bronchospastic   -SpO2 % on 2 l/m  -monitor pulse ox    Debility  -continue PT/OT       Code status: Full Code   Prophylaxis: Heparin      DISCHARGE DIAGNOSES / PLAN:      Right MCA ischemic stroke status post thrombectomy early morning on May 18:  -Continue aspirin 325 mg, lipitor 80 mg and keppra 250 bid   -continue PT/OT  -outpatient follow up with Neurologist   HTN  -continue norvasc, losartan, hydralazine and monitor BP  -Discontinued nicardipine gtt  -monitor BP  Hypothyroidism  -Continue home dose levothyroxine   Glaucoma  -continue eyedrops  Hx ot T2DM  -continue sliding scale and monitor finger stick glucose  HFpEF  S/p PPM  -compensated, continue losartan and demadex    -monitor I/O   COPD   -not bronchospastic   -SpO2 % on RA  -monitor pulse ox    Debility  -continue PT/OT      PENDING TEST RESULTS:   At the time of discharge the following test results are still pending: None    FOLLOW UP APPOINTMENTS:    Follow-up Information     Follow up With Specialties Details Why Contact Info   1. Gisele Stephen MD Family Medicine In one week   [place] [phone]        2. Luh Pike MD, Neurologist in 3-4 weeks    DIET: Dysphagia diet-pureed    ACTIVITY: Activity as tolerated    EQUIPMENT needed: None    DISCHARGE MEDICATIONS:  Current Discharge Medication List      START taking these medications    Details   acetaminophen (TYLENOL) 325 mg tablet Take 2 Tablets by mouth every four (4) hours as needed for Fever (For temp greater than or equal to 38.5 C or 101.3 F (Unless hepatic failure or contrindicated). Give first line for fever. ). Qty: 30 Tablet, Refills: 0  Start date: 5/23/2022      atorvastatin (LIPITOR) 80 mg tablet Take 1 Tablet by mouth daily. Qty: 30 Tablet, Refills: 0  Start date: 5/24/2022      amLODIPine (NORVASC) 10 mg tablet Take 1 Tablet by mouth daily.   Qty: 30 Tablet, Refills: 0  Start date: 5/24/2022 aspirin (ASPIRIN) 325 mg tablet Take 1 Tablet by mouth daily. Qty: 30 Tablet, Refills: 0  Start date: 5/24/2022      levETIRAcetam (KEPPRA) 250 mg tablet Take 1 Tablet by mouth two (2) times a day. Qty: 60 Tablet, Refills: 0  Start date: 5/23/2022      timolol (TIMOPTIC) 0.5 % ophthalmic solution Administer 1 Drop to both eyes two (2) times a day. Qty: 10 mL, Refills: 0  Start date: 5/23/2022         CONTINUE these medications which have CHANGED    Details   levothyroxine (SYNTHROID) 112 mcg tablet Take 1 Tablet by mouth Daily (before breakfast). Qty: 30 Tablet, Refills: 0  Start date: 5/24/2022         CONTINUE these medications which have NOT CHANGED    Details   brimonidine-timoloL (Combigan) 0.2-0.5 % drop ophthalmic solution Administer 1 Drop to both eyes two (2) times a day. losartan (COZAAR) 100 mg tablet Take 100 mg by mouth daily. torsemide (DEMADEX) 10 mg tablet Take 10 mg by mouth daily. DISPOSITION:    Home With:   OT  PT  HH  RN      x Long term SNF/Inpatient Rehab    Independent/assisted living    Hospice    Other:       PATIENT CONDITION AT DISCHARGE:     Functional status    Poor    x Deconditioned     Independent      Cognition     Lucid    x Forgetful     Dementia      Catheters/lines (plus indication)    Barajas     PICC     PEG    x None      Code status    x Full code     DNR      PHYSICAL EXAMINATION AT DISCHARGE:  Patient Vitals for the past 12 hrs:   Temp Pulse Resp BP SpO2   05/23/22 1200 -- 64 -- -- --   05/23/22 1000 97.7 °F (36.5 °C) 75 -- (!) 156/68 --   05/23/22 0950 -- 76 -- (!) 174/76 --   05/23/22 0914 -- -- -- (!) 166/7 --   05/23/22 0900 -- -- -- -- 97 %   05/23/22 0657 -- -- -- (!) 166/75 --   05/23/22 0624 97.4 °F (36.3 °C) 71 16 (!) 194/89 100 %   05/23/22 0552 -- 63 -- -- --   05/23/22 0400 -- 64 -- -- --     General:          Alert, cooperative, no distress, appears stated age.      HEENT:           Atraumatic, anicteric sclerae, pink conjunctivae No oral ulcers, mucosa moist, throat clear, dentition fair  Neck:               Supple, symmetrical  Lungs:             Clear to auscultation bilaterally. No Wheezing or Rhonchi. No rales. Chest wall:      No tenderness  No Accessory muscle use. Heart:              Regular  rhythm,  No  murmur   No edema  Abdomen:        Soft, non-tender. Not distended. Bowel sounds normal  Extremities:     No cyanosis. No clubbing,                            Skin turgor normal, Capillary refill normal  Skin:                Not pale. Not Jaundiced  No rashes   Psych:             Not anxious or agitated.   Neurologic:      Alert, moves all extremities, answers questions appropriately and responds to commands       Recent Results (from the past 24 hour(s))   GLUCOSE, POC    Collection Time: 05/22/22  4:33 PM   Result Value Ref Range    Glucose (POC) 93 65 - 117 mg/dL    Performed by Kade MARKS    GLUCOSE, POC    Collection Time: 05/22/22  9:31 PM   Result Value Ref Range    Glucose (POC) 86 65 - 117 mg/dL    Performed by Jamal Gibbs    MAGNESIUM    Collection Time: 05/23/22  3:15 AM   Result Value Ref Range    Magnesium 2.1 1.6 - 2.4 mg/dL   PHOSPHORUS    Collection Time: 05/23/22  3:15 AM   Result Value Ref Range    Phosphorus 2.6 2.6 - 4.7 MG/DL   CBC W/O DIFF    Collection Time: 05/23/22  3:15 AM   Result Value Ref Range    WBC 7.0 3.6 - 11.0 K/uL    RBC 3.87 3.80 - 5.20 M/uL    HGB 11.0 (L) 11.5 - 16.0 g/dL    HCT 35.9 35.0 - 47.0 %    MCV 92.8 80.0 - 99.0 FL    MCH 28.4 26.0 - 34.0 PG    MCHC 30.6 30.0 - 36.5 g/dL    RDW 13.9 11.5 - 14.5 %    PLATELET 044 713 - 417 K/uL    MPV 10.8 8.9 - 12.9 FL    NRBC 0.0 0  WBC    ABSOLUTE NRBC 0.00 0.00 - 3.16 K/uL   METABOLIC PANEL, COMPREHENSIVE    Collection Time: 05/23/22  3:15 AM   Result Value Ref Range    Sodium 145 136 - 145 mmol/L    Potassium 4.0 3.5 - 5.1 mmol/L    Chloride 110 (H) 97 - 108 mmol/L    CO2 33 (H) 21 - 32 mmol/L    Anion gap 2 (L) 5 - 15 mmol/L    Glucose 95 65 - 100 mg/dL    BUN 9 6 - 20 MG/DL    Creatinine 0.72 0.55 - 1.02 MG/DL    BUN/Creatinine ratio 13 12 - 20      GFR est AA >60 >60 ml/min/1.73m2    GFR est non-AA >60 >60 ml/min/1.73m2    Calcium 9.9 8.5 - 10.1 MG/DL    Bilirubin, total 0.7 0.2 - 1.0 MG/DL    ALT (SGPT) 21 12 - 78 U/L    AST (SGOT) 37 15 - 37 U/L    Alk.  phosphatase 90 45 - 117 U/L    Protein, total 7.0 6.4 - 8.2 g/dL    Albumin 2.8 (L) 3.5 - 5.0 g/dL    Globulin 4.2 (H) 2.0 - 4.0 g/dL    A-G Ratio 0.7 (L) 1.1 - 2.2     GLUCOSE, POC    Collection Time: 05/23/22  6:43 AM   Result Value Ref Range    Glucose (POC) 93 65 - 117 mg/dL    Performed by Christian Hernandez    ECHO ADULT COMPLETE    Collection Time: 05/23/22  9:44 AM   Result Value Ref Range    IVSd 1.2 (A) 0.6 - 0.9 cm    LVIDd 3.5 (A) 3.9 - 5.3 cm    LVIDs 2.5 cm    LVPWd 1.4 (A) 0.6 - 0.9 cm    LVOT Peak Gradient 6 mmHg    LVOT Mean Gradient 3 mmHg    LVOT Peak Velocity 1.2 m/s    LVOT VTI 25.5 cm    LA Diameter 3.1 cm    AV Peak Gradient 19 mmHg    AV Peak Gradient 18 mmHg    AV Mean Gradient 8 mmHg    AV Peak Velocity 2.1 m/s    AV Peak Velocity 2.2 m/s    AV Mean Velocity 1.3 m/s    AV VTI 41.5 cm    MV A Velocity 0.79 m/s    MV E Wave Deceleration Time 249.8 ms    MV E Velocity 0.52 m/s    LV E' Lateral Velocity 6 cm/s    LV E' Septal Velocity 4 cm/s    MV PHT 72.5 ms    MV Area by PHT 3.0 cm2    PV Peak Gradient 4 mmHg    PV Max Velocity 1.0 m/s    TAPSE 2.3 1.7 cm    TR Peak Gradient 92 mmHg    TR Max Velocity 4.80 m/s    Aortic Root 3.3 cm    Fractional Shortening 2D 29 28 - 44 %    LVIDd Index 1.80 cm/m2    LVIDs Index 1.29 cm/m2    LV RWT Ratio 0.80     LV Mass 2D 153.8 67 - 162 g    LV Mass 2D Index 79.3 43 - 95 g/m2    MV E/A 0.66     E/E' Ratio (Averaged) 10.83     E/E' Lateral 8.67     E/E' Septal 13.00     LA Size Index 1.60 cm/m2    LA/AO Root Ratio 0.94     Ao Root Index 1.70 cm/m2    LVOT:AV VTI Index 0.61     PASP 86 mmHg   GLUCOSE, POC Collection Time: 05/23/22 11:40 AM   Result Value Ref Range    Glucose (POC) 149 (H) 65 - 117 mg/dL    Performed by Jing Gonzalez      CHRONIC MEDICAL DIAGNOSES:  Problem List as of 5/23/2022 Never Reviewed          Codes Class Noted - Resolved    Abnormal EEG ICD-10-CM: R94.01  ICD-9-CM: 794.02  5/19/2022 - Present        Stroke (cerebrum) ([facility] [address] ICD-10-CM: I63.9  ICD-9-CM: 434.91  5/18/2022 - Present        Lumbar stenosis ICD-10-CM: M48.061  ICD-9-CM: 724.02  3/13/2012 - Present        Arthritis ICD-10-CM: M19.90  ICD-9-CM: 716.90  Unknown - Present              Greater than 45 minutes were spent with the patient on counseling and coordination of care    Signed:   Lo Partida MD  5/23/2022  12:54 PM

## 2022-05-23 NOTE — PROGRESS NOTES
Bedside shift change report given to BARBER Yanez (oncoming nurse) by Brtitney Ortega RN (offgoing nurse). Report included the following information SBAR, Cardiac Rhythm nsr and Dual Neuro Assessment.

## 2022-05-23 NOTE — PROGRESS NOTES
Transition of Care Plan: [facility]   RN to call report to [phone], CLAU    RUR: 13% low     PCP F/U: Vidhi Bhardwaj MD     Disposition:  [facility]A     Transportation: [facility]-4:15-4:30pm     Main Contact: Daughter: Becca Stearns: [phone]    6314: Patient medically ready. Message sent to [facility] to see if they can accept patient today. 1049: Received message from Taryn Shin at [facility] that she is checking to confirm discharges at their facility and will let this CM know if they can accept today    1252: [facility] can accepted today at 4pm. [facility] confirmed transport time at 4:15-4:30pm. EMTALA information pending    1321: EMTALA information received. RN notified of discharge time. 1333: Spoke with daughter Julienne Ahumada. Informed her of patient's discharge and time. Daughter agreeable. Notified of IMM letter. Requests it be emailed to [email or web address]. Email sent. D/C summary to be faxed to [phone] when available. 1522: Discharge summary faxed. Vitaliy Oro RN, CRM    Medicare pt has received, reviewed, and signed IM letter informing them of their right to appeal the discharge. Signed copy has been placed on pt bedside chart.

## 2022-05-23 NOTE — PROGRESS NOTES
Problem: Self Care Deficits Care Plan (Adult)  Goal: *Acute Goals and Plan of Care (Insert Text)  Description: FUNCTIONAL STATUS PRIOR TO ADMISSION: Patient unable to report prior level of function or home environment information at this time. Per chart review, patient independent for ADLs and uses a walker for ambulation at baseline. HOME SUPPORT: The patient lived with family but did not require assist. Family available to assist PRN per CM note. Occupational Therapy Goals  Initiated 5/18/2022  1. Patient will perform grooming sitting EOB with moderate assistance  within 7 day(s). 2.  Patient will perform lower body dressing with maximal assistance within 7 day(s). 3.  Patient will perform anterior bathing from neck to thighs with maximal assistance within 7 day(s). 4.  Patient will perform toilet transfers with maximal assistance within 7 day(s). 5.  Patient will perform all aspects of toileting with maximal assistance within 7 day(s). 6.  Patient will participate in upper extremity therapeutic exercise/activities with moderate assistance  for 5 minutes within 7 day(s). Outcome: Progressing Towards Goal    OCCUPATIONAL THERAPY TREATMENT  Patient: Cabrera Goyal (54 y.o. female)  Date: 5/23/2022  Diagnosis: Stroke (cerebrum) [facility]) [I63.9] <principal problem not specified>       Precautions: Fall  Chart, occupational therapy assessment, plan of care, and goals were reviewed. ASSESSMENT  Patient continues with skilled OT services and is progressing towards goals. Patient drowsy this date and primarily maintained eyes closed throughout, noted need to have assist to open her right eye, feel due to eye secretions. Perseverated on task when washing her face. Able to push herself up in bed with LE's  in bridge, noted to lean to left in supine.      Current Level of Function Impacting Discharge (ADLs): set-up face washing with cues    Other factors to consider for discharge: to go to rehab today PLAN :  Patient continues to benefit from skilled intervention to address the above impairments. Continue treatment per established plan of care to address goals. Recommend next OT session: ADL's  seated edge of bed as tolerated    Recommendation for discharge: (in order for the patient to meet his/her long term goals)  Therapy 3 hours per day 5-7 days per week    This discharge recommendation:  Has been made in collaboration with the attending provider and/or case management    IF patient discharges home will need the following DME: none       SUBJECTIVE:   Patient stated I've been to rehab before.     OBJECTIVE DATA SUMMARY:   Cognitive/Behavioral Status:  Neurologic State: Drowsy  Orientation Level: Disoriented to place; Disoriented to situation;Disoriented to time;Oriented to person  Cognition: Decreased attention/concentration;Decreased command following     Perseveration: Perseverates during ADLS       Functional Mobility and Transfers for ADLs:  Bed Mobility:  Scooting: Moderate assistance;Assist x1;Additional time (facilitated LE's in bridge to assit with push to head of bed)      ADL Intervention:  Feeding  Feeding Assistance:  (declined to eat)    Grooming  Grooming Assistance: Minimum assistance  Position Performed: Other (comment) (in supine head of bed elevated)  Washing Face: Set-up; Supervision (cues to finish task due to perseveration on washing chin)     Instructed on use of call bell to call for RN    Facilitated bilateral LE's in bridge and provided proprioceptive cue for weight bearing through her feet to push to head of bed, completed with mod to min assist      Pain:  No sign of    Activity Tolerance:   Poor and drowsy    After treatment patient left in no apparent distress:   Supine in bed, Call bell within reach, Bed / chair alarm activated, and Side rails x 3    COMMUNICATION/COLLABORATION:   The patients plan of care was discussed with: Registered nurse and Case management.      Janette Simmons TYSHAWN Zuniga OTR/L  Time Calculation: 16 mins

## 2022-05-23 NOTE — PROGRESS NOTES
Problem: TIA/CVA Stroke: 0-24 hours  Goal: Nutrition/Diet  Outcome: Progressing Towards Goal  Goal: Discharge Planning  Outcome: Progressing Towards Goal

## 2022-05-23 NOTE — PROGRESS NOTES
Problem: Falls - Risk of  Goal: *Absence of Falls  Description: Document Karon Shelia Fall Risk and appropriate interventions in the flowsheet. Outcome: Progressing Towards Goal  Note: Fall Risk Interventions:  Mobility Interventions: Assess mobility with egress test,Bed/chair exit alarm,Communicate number of staff needed for ambulation/transfer,OT consult for ADLs,Patient to call before getting OOB,PT Consult for mobility concerns,PT Consult for assist device competence,Utilize walker, cane, or other assistive device    Mentation Interventions: Adequate sleep, hydration, pain control,Bed/chair exit alarm,Door open when patient unattended,Evaluate medications/consider consulting pharmacy,More frequent rounding,Increase mobility,Reorient patient,Room close to nurse's station,Toileting rounds,Update white board    Medication Interventions: Evaluate medications/consider consulting pharmacy,Patient to call before getting OOB,Teach patient to arise slowly    Elimination Interventions: Bed/chair exit alarm,Call light in reach,Patient to call for help with toileting needs,Stay With Me (per policy)              Problem: Pressure Injury - Risk of  Goal: *Prevention of pressure injury  Description: Document Manuel Scale and appropriate interventions in the flowsheet.   Outcome: Progressing Towards Goal  Note: Pressure Injury Interventions:  Sensory Interventions: Assess changes in LOC,Check visual cues for pain,Discuss PT/OT consult with provider,Keep linens dry and wrinkle-free,Minimize linen layers,Pressure redistribution bed/mattress (bed type)    Moisture Interventions: Absorbent underpads,Internal/External urinary devices,Limit adult briefs,Minimize layers,Moisture barrier    Activity Interventions: Increase time out of bed,Pressure redistribution bed/mattress(bed type),PT/OT evaluation    Mobility Interventions: HOB 30 degrees or less,Pressure redistribution bed/mattress (bed type),PT/OT evaluation    Nutrition Interventions: Document food/fluid/supplement intake,Discuss nutritional consult with provider    Friction and Shear Interventions: HOB 30 degrees or less,Lift sheet,Lift team/patient mobility team,Minimize layers                Problem: Pain  Goal: *Control of Pain  Outcome: Progressing Towards Goal  Goal: *PALLIATIVE CARE:  Alleviation of Pain  Outcome: Progressing Towards Goal     Problem: TIA/CVA Stroke: Day 2 Until Discharge  Goal: Off Pathway (Use only if patient is Off Pathway)  Outcome: Progressing Towards Goal  Goal: Activity/Safety  Outcome: Progressing Towards Goal  Goal: Diagnostic Test/Procedures  Outcome: Progressing Towards Goal  Goal: Nutrition/Diet  Outcome: Progressing Towards Goal  Goal: Discharge Planning  Outcome: Progressing Towards Goal  Goal: Medications  Outcome: Progressing Towards Goal  Goal: Respiratory  Outcome: Progressing Towards Goal  Goal: Treatments/Interventions/Procedures  Outcome: Progressing Towards Goal  Goal: Psychosocial  Outcome: Progressing Towards Goal  Goal: *Verbalizes anxiety and depression are reduced or absent  Outcome: Progressing Towards Goal  Goal: *Absence of aspiration  Outcome: Progressing Towards Goal  Goal: *Absence of deep venous thrombosis signs and symptoms(Stroke Metric)  Outcome: Progressing Towards Goal  Goal: *Optimal pain control at patient's stated goal  Outcome: Progressing Towards Goal  Goal: *Tolerating diet  Outcome: Progressing Towards Goal  Goal: *Ability to perform ADLs and demonstrates progressive mobility and function  Outcome: Progressing Towards Goal  Goal: *Stroke education continued(Stroke Metric)  Outcome: Progressing Towards Goal

## 2022-05-23 NOTE — PROGRESS NOTES
[facility] [facility] Adult  Hospitalist Group                                                                                          Hospitalist Progress Note  Keyla Lucero MD  Answering service: [phone] from in house phone        Date of Service:  2022  NAME:  Aurora Wyman  :  1928  MRN:  [id number]      Admission Summary:     A 80year-old lady presented to ER at [facility] the evening of May 17 with left-sided weakness.  CTA showed right MCA thrombus and underwent thrombectomy early morning on May 18.    : No acute event, cooperative and pleasant improvement in strength on left side, passed swallow eval, on low-dose nicardipine this morning but this is being weaned off.  Follow-up CAT scan did not show complication and transferred out of ICU on     Interval history / Subjective:     Poor historian, she said she feels better, no left side chest pain     Assessment & Plan:     Right MCA ischemic stroke status post thrombectomy early morning on May 18:  -improvement in left-sided weakness.   -Continue aspirin 325 mg and statin.    -continue on keppra 250 bid   -Continue neuro check and supportive care   -EEG Abnormal EEG due to mild slowing seen in the right central temporal region along with a rare sharply contoured slow waveform.  -continue PT/OT  -Neurointerventional surgeon and Neurologist on board     HTN  -BP improving,   -increased norvasc to 10  mg, losartan, hydralazine and monitor BP  -Discontinued nicardipine gtt  -monitor BP     Hypothyroidism  -TSH normal  -Continue home dose levothyroxine     Glaucoma  -Stable, continue eyedrops     Hx ot T2DM  -A1c 5.8  -continue sliding scale and monitor finger stick glucose     HFpEF  S/p PPM  -compensated, on losartan  -home demadex on hold  -monitor I/O     COPD   -not bronchospastic   -SpO2 % on 2 l/m  -monitor pulse ox    Debility  -continue PT/OT          Code status: Full Code Prophylaxis: Heparin  Care Plan discussed with:Patient, Nurse and CM  Anticipated Disposition: Inpatient [address] Problems  Never Reviewed          Codes Class Noted POA    Abnormal EEG ICD-10-CM: R94.01  ICD-9-CM: 794.02  5/19/2022 Unknown        Stroke (cerebrum) ([facility] [address] ICD-10-CM: I63.9  ICD-9-CM: 434.91  5/18/2022 Unknown                 Vital Signs:    Last 24hrs VS reviewed since prior progress note. Most recent are:  Visit Vitals  BP (!) 174/76   Pulse 75   Temp 97.4 °F (36.3 °C)   Resp 16   Ht 5' 6\" (1.676 m)   Wt 84 kg (185 lb 3 oz)   SpO2 100%   BMI 29.89 kg/m²         Intake/Output Summary (Last 24 hours) at 5/23/2022 1008  Last data filed at 5/23/2022 0432  Gross per 24 hour   Intake --   Output 1225 ml   Net -1225 ml        Physical Examination:     I had a face to face encounter with this patient and independently examined them on 5/23/2022 as outlined below:          Constitutional:  No acute distress, cooperative, pleasant    ENT:  Oral mucosa moist, oropharynx benign. Resp:  CTA bilaterally. No wheezing/rhonchi/rales. No accessory muscle use. CV:  Regular rhythm, normal rate, no murmurs, gallops, rubs    GI:  Soft, non distended, non tender.  normoactive bowel sounds, no hepatosplenomegaly     Musculoskeletal:  No edema,     Neurologic:  Conscious and alert, oriented to place and person, Moves all extremities, CN II-XII reviewed            Data Review:    Review and/or order of clinical lab test  Review and/or order of tests in the radiology section of CPT  Review and/or order of tests in the medicine section of CPT      Labs:     Recent Labs     05/23/22 0315 05/22/22  0106   WBC 7.0 5.9   HGB 11.0* 10.4*   HCT 35.9 33.5*    188     Recent Labs     05/23/22  0315 05/22/22  0106    146*   K 4.0 3.4*   * 107   CO2 33* 37*   BUN 9 12   CREA 0.72 0.76   GLU 95 107*   CA 9.9 9.3   MG 2.1 2.0   PHOS 2.6 2.7     Recent Labs     05/23/22  0315 05/22/22  0106   ALT 21 17 AP 90 90   TBILI 0.7 0.8   TP 7.0 6.9   ALB 2.8* 2.8*   GLOB 4.2* 4.1*     No results for input(s): INR, PTP, APTT, INREXT, INREXT in the last 72 hours. No results for input(s): FE, TIBC, PSAT, FERR in the last 72 hours. No results found for: FOL, RBCF   No results for input(s): PH, PCO2, PO2 in the last 72 hours. No results for input(s): CPK, CKNDX, TROIQ in the last 72 hours.     No lab exists for component: CPKMB  Lab Results   Component Value Date/Time    Cholesterol, total 170 05/18/2022 05:07 AM    HDL Cholesterol 44 05/18/2022 05:07 AM    LDL, calculated 111.8 (H) 05/18/2022 05:07 AM    Triglyceride 71 05/18/2022 05:07 AM    CHOL/HDL Ratio 3.9 05/18/2022 05:07 AM     Lab Results   Component Value Date/Time    Glucose (POC) 93 05/23/2022 06:43 AM    Glucose (POC) 86 05/22/2022 09:31 PM    Glucose (POC) 93 05/22/2022 04:33 PM    Glucose (POC) 109 05/22/2022 11:36 AM    Glucose (POC) 112 05/22/2022 06:38 AM     Lab Results   Component Value Date/Time    Color YELLOW 03/28/2012 07:30 PM    Appearance CLEAR 03/28/2012 07:30 PM    Specific gravity 1.012 03/28/2012 07:30 PM    Specific gravity 1.015 03/15/2012 07:15 PM    pH (UA) 6.0 03/28/2012 07:30 PM    Protein NEGATIVE  03/28/2012 07:30 PM    Glucose NEGATIVE  03/28/2012 07:30 PM    Ketone NEGATIVE  03/28/2012 07:30 PM    Bilirubin NEGATIVE  03/28/2012 07:30 PM    Urobilinogen 1.0 03/28/2012 07:30 PM    Nitrites NEGATIVE  03/28/2012 07:30 PM    Leukocyte Esterase NEGATIVE  03/28/2012 07:30 PM    Epithelial cells 0-5 03/28/2012 07:30 PM    Bacteria NEGATIVE  03/28/2012 07:30 PM    WBC 0-4 03/28/2012 07:30 PM    RBC 0-3 03/28/2012 07:30 PM         Medications Reviewed:     Current Facility-Administered Medications   Medication Dose Route Frequency    amLODIPine (NORVASC) tablet 5 mg  5 mg Oral DAILY    hydrALAZINE (APRESOLINE) 20 mg/mL injection 10 mg  10 mg IntraVENous Q6H PRN    losartan (COZAAR) tablet 100 mg  100 mg Oral DAILY    atorvastatin (LIPITOR) tablet 80 mg  80 mg Oral DAILY    aspirin tablet 325 mg  325 mg Oral DAILY    heparin (porcine) injection 5,000 Units  5,000 Units SubCUTAneous Q8H    insulin lispro (HUMALOG) injection   SubCUTAneous AC&HS    levETIRAcetam (KEPPRA) tablet 250 mg  250 mg Oral BID    acetaminophen (TYLENOL) tablet 650 mg  650 mg Oral Q4H PRN    Or    acetaminophen (TYLENOL) solution 650 mg  650 mg Per NG tube Q4H PRN    Or    acetaminophen (TYLENOL) suppository 650 mg  650 mg Rectal Q4H PRN    sodium chloride (NS) flush 5-40 mL  5-40 mL IntraVENous Q8H    sodium chloride (NS) flush 5-40 mL  5-40 mL IntraVENous PRN    glucose chewable tablet 16 g  4 Tablet Oral PRN    dextrose 10 % infusion 0-250 mL  0-250 mL IntraVENous PRN    glucagon (GLUCAGEN) injection 1 mg  1 mg IntraMUSCular PRN    levothyroxine (SYNTHROID) tablet 112 mcg  112 mcg Oral ACB    brimonidine (ALPHAGAN) 0.2 % ophthalmic solution 1 Drop  1 Drop Both Eyes BID    And    timolol (TIMOPTIC) 0.5 % ophthalmic solution 1 Drop  1 Drop Both Eyes BID     ______________________________________________________________________  EXPECTED LENGTH OF STAY: 4d 2h  ACTUAL LENGTH OF STAY:          5                 Carla Samuel MD

## 2022-05-24 NOTE — DISCHARGE INSTRUCTIONS
Discharge SNF/Rehab Instructions/LTAC       PATIENT ID: Kalyn Whatley  MRN: [id number]   YOB: 1928    DATE OF ADMISSION: 5/18/2022 12:51 AM    DATE OF DISCHARGE: 5/23/2022    PRIMARY CARE PROVIDER: Aaliyah Hayes MD       ATTENDING PHYSICIAN: Tom Ko MD  DISCHARGING PROVIDER: Brian Boyer MD     To contact this individual call [phone] and ask the  to page. If unavailable ask to be transferred the Adult Hospitalist Department. CONSULTATIONS: IP CONSULT TO NEUROLOGY  IP CONSULT TO INTERVENTIONAL RADIOLOGY  IP CONSULT TO PALLIATIVE CARE - PROVIDER    PROCEDURES/SURGERIES: * No surgery found *    ADMITTING [address] [place] [address] COURSE:     A 80year-old lady presented to ER at [facility] the evening of May 17 with left-sided weakness.  CTA showed right MCA thrombus and underwent thrombectomy early morning on May 18.     5/18: No acute event, cooperative and pleasant improvement in strength on left side, passed swallow eval, on low-dose nicardipine this morning but this is being weaned off.  Follow-up CAT scan did not show complication and transferred out of ICU on 5/19    Right MCA ischemic stroke status post thrombectomy early morning on May 18:  -improvement in left-sided weakness.   -Continue aspirin 325 mg and statin.    -started on keppra 250 bid   -Continue neuro check and supportive care   -EEG Abnormal EEG due to mild slowing seen in the right central temporal region along with a rare sharply contoured slow waveform.  -continue PT/OT  -Neurointerventional surgeon and Neurologist on board  HTN  -BP fairly controled  -on norvasc, losartan, hydralazine and monitor BP  -Discontinued nicardipine gtt  -monitor BP  Hypothyroidism  -TSH normal  -Continue home dose levothyroxine   Glaucoma  -Stable, continue eyedrops  Hx ot T2DM  -A1c 5.8  -continue sliding scale and monitor finger stick glucose  HFpEF  S/p PPM  -compensated, on losartan  -home demadex on hold  -monitor I/O   COPD   -not bronchospastic   -SpO2 % on 2 l/m  -monitor pulse ox    Debility  -continue PT/OT       Code status: Full Code   Prophylaxis: Heparin      DISCHARGE DIAGNOSES / PLAN:      Right MCA ischemic stroke status post thrombectomy early morning on May 18:  -Continue aspirin 325 mg, lipitor 80 mg and keppra 250 bid   -continue PT/OT  -outpatient follow up with Neurologist   HTN  -continue norvasc, losartan, hydralazine and monitor BP  -Discontinued nicardipine gtt  -monitor BP  Hypothyroidism  -Continue home dose levothyroxine   Glaucoma  -continue eyedrops  Hx ot T2DM  -continue sliding scale and monitor finger stick glucose  HFpEF  S/p PPM  -compensated, continue losartan and demadex    -monitor I/O   COPD   -not bronchospastic   -SpO2 % on RA  -monitor pulse ox    Debility  -continue PT/OT      PENDING TEST RESULTS:   At the time of discharge the following test results are still pending: None    FOLLOW UP APPOINTMENTS:    Follow-up Information     Follow up With Specialties Details Why Contact Info   1. Rebecca Foy MD Family Medicine In one week   [place] [phone]        2. Martina Brice MD, Neurologist in 3-4 weeks    ADDITIONAL CARE RECOMMENDATIONS:      DIET: Dysphagia diet-pureed     TUBE FEEDING INSTRUCTIONS: None    OXYGEN / BiPAP SETTINGS: None    ACTIVITY: Activity as tolerated    WOUND CARE: None    EQUIPMENT needed: None      DISCHARGE MEDICATIONS:   See Medication Reconciliation Form      NOTIFY YOUR PHYSICIAN FOR ANY OF THE FOLLOWING:   Fever over 101 degrees for 24 hours. Chest pain, shortness of breath, fever, chills, nausea, vomiting, diarrhea, change in mentation, falling, weakness, bleeding. Severe pain or pain not relieved by medications. Or, any other signs or symptoms that you may have questions about.     DISPOSITION:    Home With:   OT  PT  HH  RN      x SNF/Inpatient Rehab/LTAC Independent/assisted living    Hospice    Other:       PATIENT CONDITION AT DISCHARGE:     Functional status    Poor     Deconditioned    x Independent      Cognition     Lucid    x Forgetful     Dementia      Catheters/lines (plus indication)    [place]     PICC     PEG    x None      Code status   x  Full code     DNR      PHYSICAL EXAMINATION AT DISCHARGE:   Refer to Progress Note       CHRONIC MEDICAL DIAGNOSES:  Problem List as of 5/23/2022 Never Reviewed          Codes Class Noted - Resolved    Abnormal EEG ICD-10-CM: R94.01  ICD-9-CM: 794.02  5/19/2022 - Present        Stroke (cerebrum) ([facility] [address] ICD-10-CM: I63.9  ICD-9-CM: 434.91  5/18/2022 - Present        Lumbar stenosis ICD-10-CM: M48.061  ICD-9-CM: 724.02  3/13/2012 - Present        Arthritis ICD-10-CM: M19.90  ICD-9-CM: 716.90  Unknown - Present                CDMP Checked:   Yes x     PROBLEM LIST Updated:  Yes x         Signed:   Leanne Ron MD  5/23/2022  9:31 PM

## 2022-05-24 NOTE — PROGRESS NOTES
Problem: Falls - Risk of  Goal: *Absence of Falls  Description: Document Shannon Mcburney Fall Risk and appropriate interventions in the flowsheet. Outcome: Progressing Towards Goal  Note: Fall Risk Interventions:  Mobility Interventions: OT consult for ADLs,Bed/chair exit alarm,Communicate number of staff needed for ambulation/transfer,PT Consult for mobility concerns,PT Consult for assist device competence,Patient to call before getting OOB    Mentation Interventions: Adequate sleep, hydration, pain control,Bed/chair exit alarm,Door open when patient unattended,Evaluate medications/consider consulting pharmacy,More frequent rounding,Reorient patient,Room close to nurse's station,Toileting rounds,Update white board    Medication Interventions: Bed/chair exit alarm,Evaluate medications/consider consulting pharmacy,Patient to call before getting OOB,Teach patient to arise slowly    Elimination Interventions: Bed/chair exit alarm,Call light in reach,Patient to call for help with toileting needs,Stay With Me (per policy),Toilet paper/wipes in reach,Toileting schedule/hourly rounds              Problem: Pressure Injury - Risk of  Goal: *Prevention of pressure injury  Description: Document Manuel Scale and appropriate interventions in the flowsheet. Outcome: Progressing Towards Goal  Note: Pressure Injury Interventions:  Sensory Interventions: Assess changes in LOC,Keep linens dry and wrinkle-free,Minimize linen layers,Pressure redistribution bed/mattress (bed type),Turn and reposition approx.  every two hours (pillows and wedges if needed),Check visual cues for pain    Moisture Interventions: Absorbent underpads,Apply protective barrier, creams and emollients,Check for incontinence Q2 hours and as needed,Internal/External urinary devices,Limit adult briefs,Minimize layers    Activity Interventions: Increase time out of bed,Pressure redistribution bed/mattress(bed type),PT/OT evaluation    Mobility Interventions: HOB 30 degrees or less,Pressure redistribution bed/mattress (bed type),PT/OT evaluation    Nutrition Interventions: Document food/fluid/supplement intake,Discuss nutritional consult with provider    Friction and Shear Interventions: HOB 30 degrees or less,Minimize layers                Problem: Pain  Goal: *Control of Pain  Outcome: Progressing Towards Goal  Goal: *PALLIATIVE CARE:  Alleviation of Pain  Outcome: Progressing Towards Goal     Problem: TIA/CVA Stroke: Day 2 Until Discharge  Goal: Off Pathway (Use only if patient is Off Pathway)  Outcome: Progressing Towards Goal  Goal: Activity/Safety  Outcome: Progressing Towards Goal  Goal: Diagnostic Test/Procedures  Outcome: Progressing Towards Goal  Goal: Nutrition/Diet  Outcome: Progressing Towards Goal  Goal: Discharge Planning  Outcome: Progressing Towards Goal  Goal: Medications  Outcome: Progressing Towards Goal  Goal: Respiratory  Outcome: Progressing Towards Goal  Goal: Treatments/Interventions/Procedures  Outcome: Progressing Towards Goal  Goal: Psychosocial  Outcome: Progressing Towards Goal  Goal: *Verbalizes anxiety and depression are reduced or absent  Outcome: Progressing Towards Goal  Goal: *Absence of aspiration  Outcome: Progressing Towards Goal  Goal: *Absence of deep venous thrombosis signs and symptoms(Stroke Metric)  Outcome: Progressing Towards Goal  Goal: *Optimal pain control at patient's stated goal  Outcome: Progressing Towards Goal  Goal: *Tolerating diet  Outcome: Progressing Towards Goal  Goal: *Ability to perform ADLs and demonstrates progressive mobility and function  Outcome: Progressing Towards Goal  Goal: *Stroke education continued(Stroke Metric)  Outcome: Progressing Towards Goal

## 2022-05-24 NOTE — PROGRESS NOTES
TRANSFER - OUT REPORT:    Verbal report given to Symone/RN(name) on Rocio Pedroza  being transferred to [facility] (unit) for routine progression of care       Report consisted of patients Situation, Background, Assessment and   Recommendations(SBAR). Information from the following report(s) SBAR, Kardex, ED Summary, Intake/Output, MAR, Accordion, Recent Results, Quality Measures and Dual Neuro Assessment was reviewed with the receiving nurse. Lines:       Opportunity for questions and clarification was provided.       Patient transported with:   O2 @ 2 liters  Tech  [facility] transport

## 2022-05-24 NOTE — PROGRESS NOTES
Bedside shift change report given to Erica/RN (oncoming nurse) by Renata/LPN (offgoing nurse). Report included the following information SBAR, Kardex, Procedure Summary, Intake/Output, MAR, Recent Results, Alarm Parameters , Procedure Verification, Quality Measures and Dual Neuro Assessment.

## 2022-09-24 ENCOUNTER — APPOINTMENT (OUTPATIENT)
Dept: GENERAL RADIOLOGY | Age: 87
DRG: 062 | End: 2022-09-24
Attending: EMERGENCY MEDICINE
Payer: MEDICARE

## 2022-09-24 ENCOUNTER — APPOINTMENT (OUTPATIENT)
Dept: CT IMAGING | Age: 87
DRG: 062 | End: 2022-09-24
Attending: EMERGENCY MEDICINE
Payer: MEDICARE

## 2022-09-24 ENCOUNTER — HOSPITAL ENCOUNTER (INPATIENT)
Age: 87
LOS: 5 days | Discharge: HOME HEALTH | DRG: 062 | End: 2022-09-29
Attending: EMERGENCY MEDICINE | Admitting: INTERNAL MEDICINE
Payer: MEDICARE

## 2022-09-24 DIAGNOSIS — I63.9 CEREBROVASCULAR ACCIDENT (CVA), UNSPECIFIED MECHANISM (HCC): Primary | ICD-10-CM

## 2022-09-24 DIAGNOSIS — I48.0 PAROXYSMAL ATRIAL FIBRILLATION (HCC): ICD-10-CM

## 2022-09-24 LAB
ALBUMIN SERPL-MCNC: 2.5 G/DL (ref 3.5–5)
ALBUMIN/GLOB SERPL: 0.6 {RATIO} (ref 1.1–2.2)
ALP SERPL-CCNC: 94 U/L (ref 45–117)
ALT SERPL-CCNC: 38 U/L (ref 12–78)
ANION GAP SERPL CALC-SCNC: 2 MMOL/L (ref 5–15)
AST SERPL-CCNC: 35 U/L (ref 15–37)
BASOPHILS # BLD: 0 K/UL (ref 0–0.1)
BASOPHILS NFR BLD: 1 % (ref 0–1)
BILIRUB SERPL-MCNC: 0.7 MG/DL (ref 0.2–1)
BUN SERPL-MCNC: 11 MG/DL (ref 6–20)
BUN/CREAT SERPL: 12 (ref 12–20)
CALCIUM SERPL-MCNC: 9.7 MG/DL (ref 8.5–10.1)
CHLORIDE SERPL-SCNC: 108 MMOL/L (ref 97–108)
CO2 SERPL-SCNC: 34 MMOL/L (ref 21–32)
CREAT SERPL-MCNC: 0.91 MG/DL (ref 0.55–1.02)
DIFFERENTIAL METHOD BLD: ABNORMAL
EOSINOPHIL # BLD: 0.2 K/UL (ref 0–0.4)
EOSINOPHIL NFR BLD: 2 % (ref 0–7)
ERYTHROCYTE [DISTWIDTH] IN BLOOD BY AUTOMATED COUNT: 14.3 % (ref 11.5–14.5)
GLOBULIN SER CALC-MCNC: 4 G/DL (ref 2–4)
GLUCOSE BLD STRIP.AUTO-MCNC: 110 MG/DL (ref 65–117)
GLUCOSE SERPL-MCNC: 89 MG/DL (ref 65–100)
HCT VFR BLD AUTO: 26.7 % (ref 35–47)
HGB BLD-MCNC: 8.4 G/DL (ref 11.5–16)
IMM GRANULOCYTES # BLD AUTO: 0 K/UL (ref 0–0.04)
IMM GRANULOCYTES NFR BLD AUTO: 1 % (ref 0–0.5)
INR PPP: 1.1 (ref 0.9–1.1)
LYMPHOCYTES # BLD: 1 K/UL (ref 0.8–3.5)
LYMPHOCYTES NFR BLD: 12 % (ref 12–49)
MCH RBC QN AUTO: 29.8 PG (ref 26–34)
MCHC RBC AUTO-ENTMCNC: 31.5 G/DL (ref 30–36.5)
MCV RBC AUTO: 94.7 FL (ref 80–99)
MONOCYTES # BLD: 0.7 K/UL (ref 0–1)
MONOCYTES NFR BLD: 8 % (ref 5–13)
NEUTS SEG # BLD: 6.3 K/UL (ref 1.8–8)
NEUTS SEG NFR BLD: 76 % (ref 32–75)
NRBC # BLD: 0 K/UL (ref 0–0.01)
NRBC BLD-RTO: 0 PER 100 WBC
PLATELET # BLD AUTO: 252 K/UL (ref 150–400)
PMV BLD AUTO: 10.5 FL (ref 8.9–12.9)
POTASSIUM SERPL-SCNC: 3.8 MMOL/L (ref 3.5–5.1)
PROT SERPL-MCNC: 6.5 G/DL (ref 6.4–8.2)
PROTHROMBIN TIME: 11.4 SEC (ref 9–11.1)
RBC # BLD AUTO: 2.82 M/UL (ref 3.8–5.2)
SERVICE CMNT-IMP: NORMAL
SODIUM SERPL-SCNC: 144 MMOL/L (ref 136–145)
TROPONIN-HIGH SENSITIVITY: 114 NG/L (ref 0–51)
WBC # BLD AUTO: 8.2 K/UL (ref 3.6–11)

## 2022-09-24 PROCEDURE — 93005 ELECTROCARDIOGRAM TRACING: CPT

## 2022-09-24 PROCEDURE — 99285 EMERGENCY DEPT VISIT HI MDM: CPT

## 2022-09-24 PROCEDURE — 85025 COMPLETE CBC W/AUTO DIFF WBC: CPT

## 2022-09-24 PROCEDURE — 94762 N-INVAS EAR/PLS OXIMTRY CONT: CPT

## 2022-09-24 PROCEDURE — 70450 CT HEAD/BRAIN W/O DYE: CPT

## 2022-09-24 PROCEDURE — 80053 COMPREHEN METABOLIC PANEL: CPT

## 2022-09-24 PROCEDURE — 77010033678 HC OXYGEN DAILY

## 2022-09-24 PROCEDURE — 74011000250 HC RX REV CODE- 250: Performed by: INTERNAL MEDICINE

## 2022-09-24 PROCEDURE — 37195 THROMBOLYTIC THERAPY STROKE: CPT

## 2022-09-24 PROCEDURE — 84484 ASSAY OF TROPONIN QUANT: CPT

## 2022-09-24 PROCEDURE — 65610000006 HC RM INTENSIVE CARE

## 2022-09-24 PROCEDURE — 2709999900 HC NON-CHARGEABLE SUPPLY

## 2022-09-24 PROCEDURE — 70496 CT ANGIOGRAPHY HEAD: CPT

## 2022-09-24 PROCEDURE — 36415 COLL VENOUS BLD VENIPUNCTURE: CPT

## 2022-09-24 PROCEDURE — 85610 PROTHROMBIN TIME: CPT

## 2022-09-24 PROCEDURE — 3E03317 INTRODUCTION OF OTHER THROMBOLYTIC INTO PERIPHERAL VEIN, PERCUTANEOUS APPROACH: ICD-10-PCS | Performed by: EMERGENCY MEDICINE

## 2022-09-24 PROCEDURE — 4A03X5D MEASUREMENT OF ARTERIAL FLOW, INTRACRANIAL, EXTERNAL APPROACH: ICD-10-PCS | Performed by: STUDENT IN AN ORGANIZED HEALTH CARE EDUCATION/TRAINING PROGRAM

## 2022-09-24 PROCEDURE — 82962 GLUCOSE BLOOD TEST: CPT

## 2022-09-24 PROCEDURE — 0042T CT CODE NEURO PERF W CBF: CPT

## 2022-09-24 PROCEDURE — 71045 X-RAY EXAM CHEST 1 VIEW: CPT

## 2022-09-24 PROCEDURE — 74011000636 HC RX REV CODE- 636: Performed by: EMERGENCY MEDICINE

## 2022-09-24 RX ORDER — ONDANSETRON 2 MG/ML
4 INJECTION INTRAMUSCULAR; INTRAVENOUS
Status: DISCONTINUED | OUTPATIENT
Start: 2022-09-24 | End: 2022-09-29 | Stop reason: HOSPADM

## 2022-09-24 RX ORDER — SODIUM CHLORIDE 0.9 % (FLUSH) 0.9 %
5-40 SYRINGE (ML) INJECTION AS NEEDED
Status: DISCONTINUED | OUTPATIENT
Start: 2022-09-24 | End: 2022-09-29 | Stop reason: HOSPADM

## 2022-09-24 RX ORDER — ATORVASTATIN CALCIUM 40 MG/1
80 TABLET, FILM COATED ORAL
Status: DISCONTINUED | OUTPATIENT
Start: 2022-09-24 | End: 2022-09-29 | Stop reason: HOSPADM

## 2022-09-24 RX ORDER — SODIUM CHLORIDE 0.9 % (FLUSH) 0.9 %
5-40 SYRINGE (ML) INJECTION EVERY 8 HOURS
Status: DISCONTINUED | OUTPATIENT
Start: 2022-09-24 | End: 2022-09-29 | Stop reason: HOSPADM

## 2022-09-24 RX ORDER — LEVOTHYROXINE SODIUM 112 UG/1
112 TABLET ORAL
Status: DISCONTINUED | OUTPATIENT
Start: 2022-09-25 | End: 2022-09-29 | Stop reason: HOSPADM

## 2022-09-24 RX ADMIN — IOPAMIDOL 100 ML: 755 INJECTION, SOLUTION INTRAVENOUS at 14:36

## 2022-09-24 RX ADMIN — SODIUM CHLORIDE, PRESERVATIVE FREE 10 ML: 5 INJECTION INTRAVENOUS at 18:00

## 2022-09-24 RX ADMIN — Medication 21 MG: at 14:29

## 2022-09-24 RX ADMIN — SODIUM CHLORIDE, PRESERVATIVE FREE 10 ML: 5 INJECTION INTRAVENOUS at 22:00

## 2022-09-24 NOTE — PROGRESS NOTES
1630  Pt arrived to CCU via stretcher. NIH completed. Pt alert and pleasant. Oriented to person, place, and situation, but disoriented to time. NIH 9 for visual deficits on right side. Limb ataxia, L sided drift, R sided numbness and some comprehension difficulties. 1730  Family at bedside.     Luis E 36 with Dr. Teddy Daniel and order received for MRI

## 2022-09-24 NOTE — H&P
ICU ADMISSION H&P                                                                                                                  [facility]      Chief Complaint: \"Brought in as stroke alert\"    HPI: The patient is a 57-year-old female with medical history as below who we are seeing in consultation at the request of Dr. Taya Tovar for admission for stroke status post TNKase. Briefly, the patient was brought in from home with complaints of aphasia and left-sided facial droop. However by the time the patient came to ED her symptoms had resolved however her NIH score was more than 8 hence after family discussion, ED and neurology team decided to use TNKase. We been asked admit the patient to the ICU for further monitoring. Of note patient was admitted with stroke symptoms in May 2022 in [facility].  At that time she had thrombectomy done. It is also pertinent to note that patient has a history of A. fib and was on Eliquis until 2 weeks back. Eliquis was stopped because was concern for GI bleed. On my arrival, the patient is sitting comfortably in the bed she is able to answer all further questions. Denies any shortness of breath chest pain fever chills or rigors. Review of Systems: All other systems have been reviewed and are negative except per HPI    Past Medical History:  Past Medical History:   Diagnosis Date    Arthritis     Diabetes ([address]     Hard of hearing     Hypertension     Hypothyroidism     Poor historian        Past Surgical History:  Past Surgical History:   Procedure Laterality Date    HX TUBAL LIGATION      IR PERC ART MECH THROMB INFUSION INTRACRANIAL  5/18/2022       Social History:   reports that she has quit smoking. She does not have any smokeless tobacco history on file. She reports that she does not drink alcohol.      Family History:  No family history on file. Allergies:  No Known Allergies    Medications:   Current Facility-Administered Medications   Medication Dose Route Frequency Provider Last Rate Last Admin    amiodarone (CORDARONE) 375 mg/250 mL D5W infusion  1 mg/min IntraVENous CONTINUOUS Kenyetta Byers MD        Followed by    amiodarone (CORDARONE) 375 mg/250 mL D5W infusion  0.5 mg/min IntraVENous CONTINUOUS Kenyetta Byers MD           Vital Signs:  Visit Vitals  /60   Pulse 62   Temp 97.5 °F (36.4 °C)   Resp 18   SpO2 100%           Intake/Output:   Last shift:      No intake/output data recorded. Last 3 shifts: No intake/output data recorded. No intake or output data in the 24 hours ending 09/24/22 1648        Physical exam:   GEN: Intubated, sedated, exam limited by sedation. HEENT: anicteric sclerae, pink conj, ETT in place. NECK: Supple, -LAD, no neck mass, CVC in place with dressing C/D/I  CV: no murmurs noted. LUNGS: Coarse BS at bases, otherwise no wheezes, rhonchi, rales  ABD: soft, non-tender, no masses  EXT: No cyanosis, distal pulses palpable, no edema  SKIN: warm, dry and intact. NEURO: Sedated, exam is limited by sedation.       Diagnostic Studies:  Admission on 09/24/2022   Component Date Value Ref Range Status    Glucose (POC) 09/24/2022 110  65 - 117 mg/dL Final    Performed by 09/24/2022 Rohan Ramos EDT   Final    Ventricular Rate 09/24/2022 60  BPM Preliminary    Atrial Rate 09/24/2022 59  BPM Preliminary    P-R Interval 09/24/2022 208  ms Preliminary    QRS Duration 09/24/2022 78  ms Preliminary    Q-T Interval 09/24/2022 420  ms Preliminary    QTC Calculation (Bezet) 09/24/2022 420  ms Preliminary    Calculated R Axis 09/24/2022 18  degrees Preliminary    Calculated T Axis 09/24/2022 22  degrees Preliminary    Diagnosis 09/24/2022    Preliminary                    Value:Atrial-paced rhythm  Low voltage QRS  When compared with ECG of 18-MAY-2022 00:06,  No significant change was found      WBC 09/24/2022 8.2  3.6 - 11.0 K/uL Final    RBC 09/24/2022 2.82 (A)  3.80 - 5.20 M/uL Final    HGB 09/24/2022 8.4 (A)  11.5 - 16.0 g/dL Final    HCT 09/24/2022 26.7 (A)  35.0 - 47.0 % Final    MCV 09/24/2022 94.7  80.0 - 99.0 FL Final    MCH 09/24/2022 29.8  26.0 - 34.0 PG Final    MCHC 09/24/2022 31.5  30.0 - 36.5 g/dL Final    RDW 09/24/2022 14.3  11.5 - 14.5 % Final    PLATELET 94/34/0256 654  150 - 400 K/uL Final    MPV 09/24/2022 10.5  8.9 - 12.9 FL Final    NRBC 09/24/2022 0.0  0  WBC Final    ABSOLUTE NRBC 09/24/2022 0.00  0.00 - 0.01 K/uL Final    NEUTROPHILS 09/24/2022 76 (A)  32 - 75 % Final    LYMPHOCYTES 09/24/2022 12  12 - 49 % Final    MONOCYTES 09/24/2022 8  5 - 13 % Final    EOSINOPHILS 09/24/2022 2  0 - 7 % Final    BASOPHILS 09/24/2022 1  0 - 1 % Final    IMMATURE GRANULOCYTES 09/24/2022 1 (A)  0.0 - 0.5 % Final    ABS. NEUTROPHILS 09/24/2022 6.3  1.8 - 8.0 K/UL Final    ABS. LYMPHOCYTES 09/24/2022 1.0  0.8 - 3.5 K/UL Final    ABS. MONOCYTES 09/24/2022 0.7  0.0 - 1.0 K/UL Final    ABS. EOSINOPHILS 09/24/2022 0.2  0.0 - 0.4 K/UL Final    ABS. BASOPHILS 09/24/2022 0.0  0.0 - 0.1 K/UL Final    ABS. IMM.  GRANS. 09/24/2022 0.0  0.00 - 0.04 K/UL Final    DF 09/24/2022 AUTOMATED    Final    Sodium 09/24/2022 144  136 - 145 mmol/L Final    Potassium 09/24/2022 3.8  3.5 - 5.1 mmol/L Final    Chloride 09/24/2022 108  97 - 108 mmol/L Final    CO2 09/24/2022 34 (A)  21 - 32 mmol/L Final    Anion gap 09/24/2022 2 (A)  5 - 15 mmol/L Final    Glucose 09/24/2022 89  65 - 100 mg/dL Final    BUN 09/24/2022 11  6 - 20 MG/DL Final    Creatinine 09/24/2022 0.91  0.55 - 1.02 MG/DL Final    BUN/Creatinine ratio 09/24/2022 12  12 - 20   Final    GFR est AA 09/24/2022 >60  >60 ml/min/1.73m2 Final    GFR est non-AA 09/24/2022 58 (A)  >60 ml/min/1.73m2 Final    Calcium 09/24/2022 9.7  8.5 - 10.1 MG/DL Final    Bilirubin, total 09/24/2022 0.7  0.2 - 1.0 MG/DL Final    ALT (SGPT) 09/24/2022 38  12 - 78 U/L Final    AST (SGOT) 09/24/2022 35  15 - 37 U/L Final    Alk. phosphatase 09/24/2022 94  45 - 117 U/L Final    Protein, total 09/24/2022 6.5  6.4 - 8.2 g/dL Final    Albumin 09/24/2022 2.5 (A)  3.5 - 5.0 g/dL Final    Globulin 09/24/2022 4.0  2.0 - 4.0 g/dL Final    A-G Ratio 09/24/2022 0.6 (A)  1.1 - 2.2   Final    INR 09/24/2022 1.1  0.9 - 1.1   Final    Prothrombin time 09/24/2022 11.4 (A)  9.0 - 11.1 sec Final    Troponin-High Sensitivity 09/24/2022 114 (A)  0 - 51 ng/L Final         maging:  CXR Results  (Last 48 hours)      None            CT Results  (Last 48 hours)                 09/24/22 1437  CTA CODE NEURO HEAD AND NECK W CONT Final result    Impression:      1. No acute vascular abnormality, no large vessel occlusion. Normal perfusion. 2. 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 3. Enlarged main pulmonary artery, compatible with pulmonary arterial   hypertension. Narrative:  CLINICAL HISTORY: Code stroke       EXAMINATION:  CT ANGIOGRAPHY HEAD AND NECK, CT PERFUSION       COMPARISON: None       TECHNIQUE:  Following the uneventful administration of iodinated contrast   material, axial CT angiography of the head and neck was performed. Delayed axial   images through the head were also obtained. Coronal and sagittal reconstructions   were obtained. Manual postprocessing of images was performed. 3-D  Sagittal   maximal intensity projection images were obtained. 3-D Coronal maximal   intensity projections were obtained. CT brain perfusion was performed with   generation of hemodynamic maps of multiple parameters, including cerebral blood   flow, cerebral blood volume, mean transit time, and TMAX. CT dose reduction was   achieved through use of a standardized protocol tailored for this examination   and automatic exposure control for dose modulation. This study was analyzed by the [address] ai algorithm.        FINDINGS:       DELAYED ENHANCEMENT HEAD CT   No intra or extra-axial mass or collection. Periventricular white matter disease   compatible with chronic small vessel ischemic change. Ventricles are normal in   size and configuration. The basal cisterns are patent. Dural venous sinuses are patent. No abnormal parenchymal or meningeal   enhancement. Mastoid air cells and paranasal sinuses are clear. CTA NECK:       Enlarged main pulmonary artery, 3.4 cm. Great vessels: Normal arch anatomy with the origins patent. Right subclavian artery: Patent       Left subclavian artery: Patent        Right common carotid artery: Patent        Left common carotid artery: Patent        Cervical right internal carotid artery: Patent with no significant stenosis by   NASCET criteria. Cervical left internal carotid artery: Patent with no significant stenosis by   NASCET criteria. Right vertebral artery: Ostial stenosis on the right. Left vertebral artery: Patent       The lung apices are clear. The thyroid is homogeneous. No cervical   lymphadenopathy. Measurements utilize NASCET criteria. CTA HEAD:       Right cavernous internal carotid artery: Patent       Left cavernous internal carotid artery: Patent       Anterior cerebral arteries: Patent       Anterior communicating artery: Patent       Right middle cerebral artery: Patent       Left middle cerebral artery: Patent       Posterior communicating arteries: Hypoplastic bilaterally       Posterior cerebral arteries: Patent       Basilar artery: Patent       Distal vertebral arteries: Patent       No evidence for intracranial aneurysm or hemodynamically significant stenosis. CT Perfusion:   Normal CT perfusion. 09/24/22 1437  CT CODE NEURO PERF W CBF Final result    Impression:      1. No acute vascular abnormality, no large vessel occlusion. Normal perfusion. 2. 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    3. Enlarged main pulmonary artery, compatible with pulmonary arterial hypertension. Narrative:  CLINICAL HISTORY: Code stroke       EXAMINATION:  CT ANGIOGRAPHY HEAD AND NECK, CT PERFUSION       COMPARISON: None       TECHNIQUE:  Following the uneventful administration of iodinated contrast   material, axial CT angiography of the head and neck was performed. Delayed axial   images through the head were also obtained. Coronal and sagittal reconstructions   were obtained. Manual postprocessing of images was performed. 3-D  Sagittal   maximal intensity projection images were obtained. 3-D Coronal maximal   intensity projections were obtained. CT brain perfusion was performed with   generation of hemodynamic maps of multiple parameters, including cerebral blood   flow, cerebral blood volume, mean transit time, and TMAX. CT dose reduction was   achieved through use of a standardized protocol tailored for this examination   and automatic exposure control for dose modulation. This study was analyzed by the [address] ai algorithm. FINDINGS:       DELAYED ENHANCEMENT HEAD CT   No intra or extra-axial mass or collection. Periventricular white matter disease   compatible with chronic small vessel ischemic change. Ventricles are normal in   size and configuration. The basal cisterns are patent. Dural venous sinuses are patent. No abnormal parenchymal or meningeal   enhancement. Mastoid air cells and paranasal sinuses are clear. CTA NECK:       Enlarged main pulmonary artery, 3.4 cm. Great vessels: Normal arch anatomy with the origins patent. Right subclavian artery: Patent       Left subclavian artery: Patent        Right common carotid artery: Patent        Left common carotid artery: Patent        Cervical right internal carotid artery: Patent with no significant stenosis by   NASCET criteria. Cervical left internal carotid artery: Patent with no significant stenosis by   NASCET criteria.        Right vertebral artery: Ostial stenosis on the right. Left vertebral artery: Patent       The lung apices are clear. The thyroid is homogeneous. No cervical   lymphadenopathy. Measurements utilize NASCET criteria. CTA HEAD:       Right cavernous internal carotid artery: Patent       Left cavernous internal carotid artery: Patent       Anterior cerebral arteries: Patent       Anterior communicating artery: Patent       Right middle cerebral artery: Patent       Left middle cerebral artery: Patent       Posterior communicating arteries: Hypoplastic bilaterally       Posterior cerebral arteries: Patent       Basilar artery: Patent       Distal vertebral arteries: Patent       No evidence for intracranial aneurysm or hemodynamically significant stenosis. CT Perfusion:   Normal CT perfusion. 09/24/22 1401  CT CODE NEURO HEAD WO CONTRAST Final result    Impression:  No acute findings. Atrophy and chronic small vessel ischemic disease the white   matter is again shown with intramural chronic infarction involving the right   external capsule and corona radiata. Narrative:  EXAM: CT CODE NEURO HEAD WO CONTRAST       INDICATION: Code Stroke       COMPARISON: CT 5/19/2022. CONTRAST: None. TECHNIQUE: Unenhanced CT of the head was performed using 5 mm images. Brain and   bone windows were generated. Coronal and sagittal reformats. CT dose reduction   was achieved through use of a standardized protocol tailored for this   examination and automatic exposure control for dose modulation. FINDINGS:   Mild prominence of the ventricles and cortical sulci is again shown, unchanged,   consistent with mild atrophy. There is mild bilateral periventricular diminished   attenuation in the cerebrum again noted as with chronic small vessel ischemic   disease the white matter. There is interim diminished attenuation in the right   external capsule and corona radiata indicating interval remote infarct. . .  There   is no intracranial hemorrhage, extra-axial collection, or mass effect. The   basilar cisterns are open. No CT evidence of acute infarct. The bone windows demonstrate no abnormalities. The visualized portions of the   paranasal sinuses and mastoid air cells are clear. Assessment :    The patient is a 70-year-old female with medical history as below who we are seeing in consultation at the request of Dr. Dayan Hardy for admission for stroke status post TNKase. Ischemic stroke    - Repeat CT head in 24 hours - early if there is any neurological deterioration. She received TNKase around 230 pm on 09/24.  - MRI in am.  - Will start on anti-platelet after 24 hours scan rules out bleeding.  - Con't Neuro checks q 1h while in ICU. - Will use antihypertensive with Cardene if systolic BP  >184 or diastolic BP >766 with Cardene gtt.  - HOB elevated >30 degrees. - Aspiration and seizure precautions  - Bedside swallow evaluation and if fails, initiate SLP consult. - If patient unable to swallow, initiate early enteral feeds.  - Early mobilization with PT/OT and Rehab consult if recommended. - Neurology consult    Critical care time spent excluding procedures and ELEAZAR visit: 45 minutes. Kevin Currie MD., DIONNE, Daniela Lockwood, FCCM, ATSF, FACP  Interventional Pulmonology and [address] [place] [address]      Portions of this record may be dictated using voice recognition software. Variances in spelling and vocabulary are possible and unintentional. Some errors may not be recognized or corrected at time of dictation.

## 2022-09-24 NOTE — ED PROVIDER NOTES
EMERGENCY DEPARTMENT HISTORY AND PHYSICAL EXAM      Date: 9/24/2022  Patient Name: Keri Campbell    History of Presenting Illness     CC: Aphasia, left side paralysis      History Provided By: EMS    HPI: Keri Campbell, 80 y.o. female presents to the ED with cc of left-sided paralysis and speech loss. Patient was brought in by [facility] from seen. Patient does have a history of A. fib as well as prior stroke. Patient had a stroke in May 2022 which resulted in a thrombectomy of large MCA clot. Patient had recovered well and had resolution of all of her symptoms. Patient had been on Eliquis for her A. fib however this was discontinued 2 weeks ago secondary to upper GI bleed. Upon arrival is noted patient's blood pressure in the  systolic range 1 L of IV fluids started. Per [facility] patient had been in A. Fib. [facility] was unable to give any further information patient at this time is unable to give any information patient alerted as a level 1 stroke and taken straight to CT. At the time of arrival to the Emergency Department patient aphasic and flaccid on the left. There are no other complaints, changes, or physical findings at this time. PCP: Suzanna Lizarraga MD    No current facility-administered medications on file prior to encounter. Current Outpatient Medications on File Prior to Encounter   Medication Sig Dispense Refill    acetaminophen (TYLENOL) 325 mg tablet Take 2 Tablets by mouth every four (4) hours as needed for Fever (For temp greater than or equal to 38.5 C or 101.3 F (Unless hepatic failure or contrindicated). Give first line for fever. ). 30 Tablet 0    atorvastatin (LIPITOR) 80 mg tablet Take 1 Tablet by mouth daily. 30 Tablet 0    amLODIPine (NORVASC) 10 mg tablet Take 1 Tablet by mouth daily. 30 Tablet 0    aspirin (ASPIRIN) 325 mg tablet Take 1 Tablet by mouth daily.  30 Tablet 0    levothyroxine (SYNTHROID) 112 mcg tablet Take 1 Tablet by mouth Daily (before breakfast). 30 Tablet 0    levETIRAcetam (KEPPRA) 250 mg tablet Take 1 Tablet by mouth two (2) times a day. 60 Tablet 0    timolol (TIMOPTIC) 0.5 % ophthalmic solution Administer 1 Drop to both eyes two (2) times a day. 10 mL 0    brimonidine-timoloL (Combigan) 0.2-0.5 % drop ophthalmic solution Administer 1 Drop to both eyes two (2) times a day. losartan (COZAAR) 100 mg tablet Take 100 mg by mouth daily. torsemide (DEMADEX) 10 mg tablet Take 10 mg by mouth daily. Past History     Past Medical History:  Past Medical History:   Diagnosis Date    Arthritis     Diabetes ([address]     Hard of hearing     Hypertension     Hypothyroidism     Poor historian        Past Surgical History:  Past Surgical History:   Procedure Laterality Date    HX TUBAL LIGATION      IR PERC ART MECH THROMB INFUSION INTRACRANIAL  5/18/2022       Family History:  No family history on file. Social History:  Social History     Tobacco Use    Smoking status: Former   Substance Use Topics    Alcohol use: No       Allergies:  No Known Allergies      Review of Systems   Review of Systems   Unable to perform ROS: Acuity of condition     Physical Exam   Physical Exam  Vitals and nursing note reviewed. Constitutional:       Appearance: She is well-developed. She is obese. HENT:      Head: Normocephalic and atraumatic. Right Ear: External ear normal.      Left Ear: External ear normal.      Mouth/Throat:      Mouth: Mucous membranes are moist.   Eyes:      General: No scleral icterus. Extraocular Movements: Extraocular movements intact. Conjunctiva/sclera: Conjunctivae normal.      Pupils: Pupils are equal, round, and reactive to light. Neck:      Vascular: No JVD. Trachea: No tracheal deviation. Cardiovascular:      Rate and Rhythm: Tachycardia present. Rhythm irregular. Heart sounds: Normal heart sounds. No murmur heard. Pulmonary:      Effort: Pulmonary effort is normal. No respiratory distress. Breath sounds: Normal breath sounds. No stridor. No wheezing or rales. Abdominal:      General: Bowel sounds are normal. There is no distension. Palpations: Abdomen is soft. Tenderness: There is no abdominal tenderness. There is no guarding or rebound. Musculoskeletal:         General: Normal range of motion. Cervical back: Normal range of motion and neck supple. Right lower leg: No edema. Left lower leg: No edema. Skin:     General: Skin is warm and dry. Capillary Refill: Capillary refill takes less than 2 seconds. Neurological:      Mental Status: She is alert and oriented to person, place, and time. Cranial Nerves: No cranial nerve deficit. Comments: Initially aphasic, flaccid paralysis on the left-hand side with left facial droop   Psychiatric:      Comments: Unable to assess       Diagnostic Study Results     Labs -     Recent Results (from the past 12 hour(s))   GLUCOSE, POC    Collection Time: 09/24/22  1:55 PM   Result Value Ref Range    Glucose (POC) 110 65 - 117 mg/dL    Performed by Judge Borges    CBC WITH AUTOMATED DIFF    Collection Time: 09/24/22  2:12 PM   Result Value Ref Range    WBC 8.2 3.6 - 11.0 K/uL    RBC 2.82 (L) 3.80 - 5.20 M/uL    HGB 8.4 (L) 11.5 - 16.0 g/dL    HCT 26.7 (L) 35.0 - 47.0 %    MCV 94.7 80.0 - 99.0 FL    MCH 29.8 26.0 - 34.0 PG    MCHC 31.5 30.0 - 36.5 g/dL    RDW 14.3 11.5 - 14.5 %    PLATELET 199 147 - 701 K/uL    MPV 10.5 8.9 - 12.9 FL    NRBC 0.0 0  WBC    ABSOLUTE NRBC 0.00 0.00 - 0.01 K/uL    NEUTROPHILS 76 (H) 32 - 75 %    LYMPHOCYTES 12 12 - 49 %    MONOCYTES 8 5 - 13 %    EOSINOPHILS 2 0 - 7 %    BASOPHILS 1 0 - 1 %    IMMATURE GRANULOCYTES 1 (H) 0.0 - 0.5 %    ABS. NEUTROPHILS 6.3 1.8 - 8.0 K/UL    ABS. LYMPHOCYTES 1.0 0.8 - 3.5 K/UL    ABS. MONOCYTES 0.7 0.0 - 1.0 K/UL    ABS. EOSINOPHILS 0.2 0.0 - 0.4 K/UL    ABS. BASOPHILS 0.0 0.0 - 0.1 K/UL    ABS. IMM.  GRANS. 0.0 0.00 - 0.04 K/UL    DF AUTOMATED Radiologic Studies -   CT CODE NEURO HEAD WO CONTRAST   Final Result   No acute findings. Atrophy and chronic small vessel ischemic disease the white   matter is again shown with intramural chronic infarction involving the right   external capsule and corona radiata. CTA CODE NEURO HEAD AND NECK W CONT    (Results Pending)   CT CODE NEURO PERF W CBF    (Results Pending)   XR CHEST PORT    (Results Pending)     CT Results  (Last 48 hours)                 09/24/22 1401  CT CODE NEURO HEAD WO CONTRAST Final result    Impression:  No acute findings. Atrophy and chronic small vessel ischemic disease the white   matter is again shown with intramural chronic infarction involving the right   external capsule and corona radiata. Narrative:  EXAM: CT CODE NEURO HEAD WO CONTRAST       INDICATION: Code Stroke       COMPARISON: CT 5/19/2022. CONTRAST: None. TECHNIQUE: Unenhanced CT of the head was performed using 5 mm images. Brain and   bone windows were generated. Coronal and sagittal reformats. CT dose reduction   was achieved through use of a standardized protocol tailored for this   examination and automatic exposure control for dose modulation. FINDINGS:   Mild prominence of the ventricles and cortical sulci is again shown, unchanged,   consistent with mild atrophy. There is mild bilateral periventricular diminished   attenuation in the cerebrum again noted as with chronic small vessel ischemic   disease the white matter. There is interim diminished attenuation in the right   external capsule and corona radiata indicating interval remote infarct. . . There   is no intracranial hemorrhage, extra-axial collection, or mass effect. The   basilar cisterns are open. No CT evidence of acute infarct. The bone windows demonstrate no abnormalities. The visualized portions of the   paranasal sinuses and mastoid air cells are clear.                  CXR Results  (Last 48 hours) None            Medical Decision Making   I am the first provider for this patient. I reviewed the vital signs, available nursing notes, past medical history, past surgical history, family history and social history. Vital Signs-Reviewed the patient's vital signs. EKG interpretation: (Preliminary)  Attrial paced rate 60    Records Reviewed: Nursing Notes, Old Medical Records, Ambulance Run Sheet, Previous Radiology Studies, and Previous Laboratory Studies  Patient with an acute right MCA stroke 5/17/2022 patient did undergo arterial thrombectomy patient had been discharged on Eliquis however patient recently had lower GI bleed and was taken off her Eliquis 2 weeks ago    Provider Notes (Medical Decision Making):  DDx-ICH, CVA, TIA, electrolyte abnormality, UTI    ED Course:   Initial assessment performed. The patients presenting problems have been discussed, and they are in agreement with the care plan formulated and outlined with them. I have encouraged them to ask questions as they arise throughout their visit. Consult note:  Case discussed with Dr. Jerica Chavez, dry CT no hemorrhage    After the initial dry CT patient's mental status had improved some and is now able to speak some with some incomprehensible speech at times. Patient does have left arm drift as well as left leg drift however significantly improved from initial presentation. Patient seen by telemetry neurology NIH of 8 patient is not currently on any anticoagulation. I had discussed the risk benefits with family over the phone including 5% possibility of major intracranial hemorrhage that could result in death or worsening symptoms. They understand the risk and would like the medicine to be given. Patient received weight-based dose 0.25 mix per ca of TN K at 1429    Patient's blood pressure systolic 354 will order IV fluid bolus. Following TNKase administration CTA with perfusion flow obtained no LVO.   Patient will be admitted here to the ICU    Patient in A. fib with RVR we will order amnio drip at 1 mg/min. Just prior to amnio drip being started patient spontaneously converted back into an atrial paced rhythm. We will hold amio. Consult note:  Case discussed with the intensivist.  Patient will be evaluated admitted. Critical Care Time:   CRITICAL CARE NOTE :    2:37 PM    IMPENDING DETERIORATION -Cardiovascular and CNS  ASSOCIATED RISK FACTORS - Dysrhythmia and CNS Decompensation  MANAGEMENT- Bedside Assessment and Supervision of Care  INTERPRETATION -  Xrays, CT Scan, ECG, Blood Pressure, Cardiac Output Measures , and labs  INTERVENTIONS - hemodynamic mngmt and Neurologic interventions   CASE REVIEW - Hospitalist/Intensivist, Medical Sub-Specialist, Nursing, and Family  TREATMENT RESPONSE -Improved  PERFORMED BY - Self    NOTES   :  I have spent 90 minutes of critical care time involved in lab review, consultations with specialist, family decision- making, bedside attention and documentation. This time excludes time spent in any separate billed procedures. During this entire length of time I was immediately available to the patient . Dulce Milian DO      Disposition:      Diagnosis     Clinical Impression:   1. Cerebrovascular accident (CVA), unspecified mechanism ([address]    2. Paroxysmal atrial fibrillation ([address]        Attestations:    Dulce Milian DO        Please note that this dictation was completed with [employer], the computer voice recognition software. Quite often unanticipated grammatical, syntax, homophones, and other interpretive errors are inadvertently transcribed by the computer software. Please disregard these errors. Please excuse any errors that have escaped final proofreading. Thank you.

## 2022-09-24 NOTE — ROUTINE PROCESS
.Patient is being transferred to [facility], Room 2552. Report given to Angel Rebollar RN on Keri Campbell for routine progression of care. Report consisted of the following information SBAR and ED Summary. Patient transferred to receiving unit by: Pacheco Roper (RN or tech name). Outstanding consults needed: No     Next labs due: No     The following personal items will be sent with the patient during transfer to the floor: All valuables:    Cardiac monitoring ordered: Yes    The following CURRENT information was reported to the receiving RN:    Code status: Prior at time of transfer    Last set of vital signs:  Vital Signs  Temp: 97.5 °F (36.4 °C) (09/24/22 1504)  Temp Source: Oral (09/24/22 1504)  Pulse (Heart Rate): 62 (09/24/22 1545)  Heart Rate Source: Monitor (09/24/22 1504)  Cardiac Rhythm: Atrial Fib (09/24/22 1504)  Resp Rate: 18 (09/24/22 1545)  BP: 109/60 (09/24/22 1545)  MAP (Monitor): 74 (09/24/22 1545)  MAP (Calculated): 76 (09/24/22 1545)  BP 1 Location: Right upper arm (09/24/22 1504)  BP 1 Method: Automatic (09/24/22 1504)  BP Patient Position: At rest (09/24/22 1504)         Oxygen Therapy  O2 Sat (%): 100 % (09/24/22 1545)  Pulse via Oximetry: 59 beats per minute (09/24/22 1545)  O2 Device: Nasal cannula (09/24/22 1504)  O2 Flow Rate (L/min): 2 l/min (09/24/22 1504)      Last pain assessment:         Wounds: No     Urinary catheter: sol  Is there a sol order: Yes    LDAs:       Peripheral IV 09/24/22 Right; Inner Forearm (Active)       Peripheral IV 09/24/22 Left Antecubital (Active)         Opportunity for questions and clarification was provided.     Jazlyn Torres RN

## 2022-09-24 NOTE — PROGRESS NOTES
-Please complete MRI History and Safety Screening Form   - Patient cannot be scanned until this form is completed, including signatures, and reviewed in MRI to ensure patient is SAFE and eligible for MRI. - CALL MRI when this has been successfully completed at [phone].

## 2022-09-25 ENCOUNTER — APPOINTMENT (OUTPATIENT)
Dept: CT IMAGING | Age: 87
DRG: 062 | End: 2022-09-25
Attending: INTERNAL MEDICINE
Payer: MEDICARE

## 2022-09-25 LAB
ANION GAP SERPL CALC-SCNC: 3 MMOL/L (ref 5–15)
ATRIAL RATE: 59 BPM
BUN SERPL-MCNC: 13 MG/DL (ref 6–20)
BUN/CREAT SERPL: 16 (ref 12–20)
CALCIUM SERPL-MCNC: 9.4 MG/DL (ref 8.5–10.1)
CALCULATED R AXIS, ECG10: 18 DEGREES
CALCULATED T AXIS, ECG11: 22 DEGREES
CHLORIDE SERPL-SCNC: 110 MMOL/L (ref 97–108)
CO2 SERPL-SCNC: 32 MMOL/L (ref 21–32)
CREAT SERPL-MCNC: 0.81 MG/DL (ref 0.55–1.02)
DIAGNOSIS, 93000: NORMAL
ERYTHROCYTE [DISTWIDTH] IN BLOOD BY AUTOMATED COUNT: 14.4 % (ref 11.5–14.5)
GLUCOSE BLD STRIP.AUTO-MCNC: 106 MG/DL (ref 65–117)
GLUCOSE BLD STRIP.AUTO-MCNC: 128 MG/DL (ref 65–117)
GLUCOSE BLD STRIP.AUTO-MCNC: 139 MG/DL (ref 65–117)
GLUCOSE BLD STRIP.AUTO-MCNC: 50 MG/DL (ref 65–117)
GLUCOSE BLD STRIP.AUTO-MCNC: 62 MG/DL (ref 65–117)
GLUCOSE BLD STRIP.AUTO-MCNC: 63 MG/DL (ref 65–117)
GLUCOSE SERPL-MCNC: 61 MG/DL (ref 65–100)
HCT VFR BLD AUTO: 22.3 % (ref 35–47)
HEMOCCULT STL QL: POSITIVE
HGB BLD-MCNC: 6.9 G/DL (ref 11.5–16)
HGB BLD-MCNC: 8 G/DL (ref 11.5–16)
HISTORY CHECKED?,CKHIST: NORMAL
MCH RBC QN AUTO: 29 PG (ref 26–34)
MCHC RBC AUTO-ENTMCNC: 30.9 G/DL (ref 30–36.5)
MCV RBC AUTO: 93.7 FL (ref 80–99)
NRBC # BLD: 0 K/UL (ref 0–0.01)
NRBC BLD-RTO: 0 PER 100 WBC
P-R INTERVAL, ECG05: 208 MS
PLATELET # BLD AUTO: 183 K/UL (ref 150–400)
PMV BLD AUTO: 10.8 FL (ref 8.9–12.9)
POTASSIUM SERPL-SCNC: 3.8 MMOL/L (ref 3.5–5.1)
Q-T INTERVAL, ECG07: 420 MS
QRS DURATION, ECG06: 78 MS
QTC CALCULATION (BEZET), ECG08: 420 MS
RBC # BLD AUTO: 2.38 M/UL (ref 3.8–5.2)
SERVICE CMNT-IMP: ABNORMAL
SERVICE CMNT-IMP: NORMAL
SODIUM SERPL-SCNC: 145 MMOL/L (ref 136–145)
VENTRICULAR RATE, ECG03: 60 BPM
WBC # BLD AUTO: 6.4 K/UL (ref 3.6–11)

## 2022-09-25 PROCEDURE — 65610000006 HC RM INTENSIVE CARE

## 2022-09-25 PROCEDURE — 97116 GAIT TRAINING THERAPY: CPT

## 2022-09-25 PROCEDURE — 80048 BASIC METABOLIC PNL TOTAL CA: CPT

## 2022-09-25 PROCEDURE — 85018 HEMOGLOBIN: CPT

## 2022-09-25 PROCEDURE — 97165 OT EVAL LOW COMPLEX 30 MIN: CPT | Performed by: OCCUPATIONAL THERAPIST

## 2022-09-25 PROCEDURE — 36415 COLL VENOUS BLD VENIPUNCTURE: CPT

## 2022-09-25 PROCEDURE — 82272 OCCULT BLD FECES 1-3 TESTS: CPT

## 2022-09-25 PROCEDURE — C9113 INJ PANTOPRAZOLE SODIUM, VIA: HCPCS | Performed by: HOSPITALIST

## 2022-09-25 PROCEDURE — 97530 THERAPEUTIC ACTIVITIES: CPT

## 2022-09-25 PROCEDURE — 99291 CRITICAL CARE FIRST HOUR: CPT | Performed by: PSYCHIATRY & NEUROLOGY

## 2022-09-25 PROCEDURE — 77010033678 HC OXYGEN DAILY

## 2022-09-25 PROCEDURE — 74011250636 HC RX REV CODE- 250/636: Performed by: HOSPITALIST

## 2022-09-25 PROCEDURE — 86923 COMPATIBILITY TEST ELECTRIC: CPT

## 2022-09-25 PROCEDURE — 82962 GLUCOSE BLOOD TEST: CPT

## 2022-09-25 PROCEDURE — 85027 COMPLETE CBC AUTOMATED: CPT

## 2022-09-25 PROCEDURE — 74011250637 HC RX REV CODE- 250/637: Performed by: HOSPITALIST

## 2022-09-25 PROCEDURE — 86900 BLOOD TYPING SEROLOGIC ABO: CPT

## 2022-09-25 PROCEDURE — 70450 CT HEAD/BRAIN W/O DYE: CPT

## 2022-09-25 PROCEDURE — 74011250637 HC RX REV CODE- 250/637: Performed by: INTERNAL MEDICINE

## 2022-09-25 PROCEDURE — 74011000250 HC RX REV CODE- 250: Performed by: HOSPITALIST

## 2022-09-25 PROCEDURE — 97530 THERAPEUTIC ACTIVITIES: CPT | Performed by: OCCUPATIONAL THERAPIST

## 2022-09-25 PROCEDURE — 97161 PT EVAL LOW COMPLEX 20 MIN: CPT

## 2022-09-25 PROCEDURE — 74011000250 HC RX REV CODE- 250: Performed by: INTERNAL MEDICINE

## 2022-09-25 RX ORDER — DEXTROSE MONOHYDRATE 100 MG/ML
INJECTION, SOLUTION INTRAVENOUS
Status: DISPENSED
Start: 2022-09-25 | End: 2022-09-26

## 2022-09-25 RX ORDER — MAGNESIUM SULFATE 100 %
4 CRYSTALS MISCELLANEOUS AS NEEDED
Status: DISCONTINUED | OUTPATIENT
Start: 2022-09-25 | End: 2022-09-29 | Stop reason: HOSPADM

## 2022-09-25 RX ORDER — DEXTROSE MONOHYDRATE 100 MG/ML
0-250 INJECTION, SOLUTION INTRAVENOUS AS NEEDED
Status: DISCONTINUED | OUTPATIENT
Start: 2022-09-25 | End: 2022-09-29 | Stop reason: HOSPADM

## 2022-09-25 RX ORDER — LEVETIRACETAM 250 MG/1
250 TABLET ORAL 2 TIMES DAILY
Status: DISCONTINUED | OUTPATIENT
Start: 2022-09-25 | End: 2022-09-29 | Stop reason: HOSPADM

## 2022-09-25 RX ORDER — DEXTROSE MONOHYDRATE 100 MG/ML
20 INJECTION, SOLUTION INTRAVENOUS CONTINUOUS
Status: DISCONTINUED | OUTPATIENT
Start: 2022-09-25 | End: 2022-09-29 | Stop reason: HOSPADM

## 2022-09-25 RX ORDER — SODIUM CHLORIDE 9 MG/ML
250 INJECTION, SOLUTION INTRAVENOUS AS NEEDED
Status: DISCONTINUED | OUTPATIENT
Start: 2022-09-25 | End: 2022-09-29 | Stop reason: HOSPADM

## 2022-09-25 RX ADMIN — PANTOPRAZOLE SODIUM 40 MG: 40 INJECTION, POWDER, FOR SOLUTION INTRAVENOUS at 21:20

## 2022-09-25 RX ADMIN — LEVETIRACETAM 250 MG: 250 TABLET, FILM COATED ORAL at 18:52

## 2022-09-25 RX ADMIN — LEVETIRACETAM 250 MG: 250 TABLET, FILM COATED ORAL at 12:23

## 2022-09-25 RX ADMIN — DEXTROSE MONOHYDRATE 20 ML/HR: 100 INJECTION, SOLUTION INTRAVENOUS at 15:09

## 2022-09-25 RX ADMIN — DEXTROSE MONOHYDRATE 250 ML: 100 INJECTION, SOLUTION INTRAVENOUS at 13:30

## 2022-09-25 RX ADMIN — PANTOPRAZOLE SODIUM 40 MG: 40 INJECTION, POWDER, FOR SOLUTION INTRAVENOUS at 15:12

## 2022-09-25 RX ADMIN — ATORVASTATIN CALCIUM 80 MG: 40 TABLET, FILM COATED ORAL at 21:20

## 2022-09-25 RX ADMIN — SODIUM CHLORIDE, PRESERVATIVE FREE 10 ML: 5 INJECTION INTRAVENOUS at 21:20

## 2022-09-25 RX ADMIN — SODIUM CHLORIDE, PRESERVATIVE FREE 10 ML: 5 INJECTION INTRAVENOUS at 06:00

## 2022-09-25 RX ADMIN — SODIUM CHLORIDE, PRESERVATIVE FREE 10 ML: 5 INJECTION INTRAVENOUS at 15:10

## 2022-09-25 NOTE — PROGRESS NOTES
Critical Care Progress Note  Dedrick Hale MD          Date of Service:  2022  NAME:  Geraline Lesches  :  1928  MRN:  [id number]      Subjective/Hospital course:      : Patient reports feeling fine. She is a bit confused and disoriented. No acute events overnight. Problem list:     -Acute ischemic stroke.  -DM2.  -HTN. -Hypothyroidism.  -Hard of hearing. -H/o GI bleed. Assessment/Plan:     Acute ischemic stroke. - Status post thrombolysis. - Serial neuro exams.  - Consulted neurology. - Obtain CT head 24 hours after tPA. - Obtain MRI without contrast.  - Obtain 2D echo. - Monitor for A. fib. - If there is any acute change in neurological status then obtain stat CT head. - Speech therapy consult for swallowing evaluation.  - Consult PT/OT. - Check fasting lipid profile. - On high intensity statins. - Check HbA1c.  - Monitor blood pressure. - Hold off on anticoagulation/antiplatelet therapy for 24 hours until the repeat CT head is done and bleeding is ruled out. Hypothyroidism:  -Continue levothyroxine. HTN:  -Holding home BP meds for now. Code status: Full code  SUP: NA  DVT prophylaxis: SCD      I personally spent --- minutes of critical care time. This is time spent at this critically ill patient's bedside actively involved in patient care as well as the coordination of care and discussions with the patient's family. This does not include any procedural time which has been billed separately. Review of Systems:   Review of Systems   All other systems reviewed and are negative.        Vital Signs:   Patient Vitals for the past 4 hrs:   BP Temp Pulse Resp SpO2   22 0800 (!) 150/66 97.8 °F (36.6 °C) 60 16 100 %   22 0700 (!) 149/67 -- 64 23 100 %   22 0600 (!) 149/65 -- 68 18 100 %        Intake/Output Summary (Last 24 hours) at 2022 0947  Last data filed at 2022 0800  Gross per 24 hour   Intake --   Output 700 ml   Net -700 ml        Physical Examination:    Physical Exam  Constitutional:       Appearance: Normal appearance. HENT:      Head: Normocephalic and atraumatic. Mouth/Throat:      Mouth: Mucous membranes are moist.   Eyes:      Extraocular Movements: Extraocular movements intact. Conjunctiva/sclera: Conjunctivae normal.   Cardiovascular:      Rate and Rhythm: Normal rate and regular rhythm. Pulmonary:      Effort: Pulmonary effort is normal. No respiratory distress. Breath sounds: Normal breath sounds. Abdominal:      General: Abdomen is flat. There is no distension. Palpations: Abdomen is soft. Tenderness: There is no abdominal tenderness. There is no guarding. Musculoskeletal:      Cervical back: Normal range of motion and neck supple. Neurological:      Mental Status: She is alert and oriented to person, place, and time. Labs and Imaging:   Reviewed.       Medications:     Current Facility-Administered Medications   Medication Dose Route Frequency    amiodarone (CORDARONE) 375 mg/250 mL D5W infusion  0.5 mg/min IntraVENous CONTINUOUS    sodium chloride (NS) flush 5-40 mL  5-40 mL IntraVENous Q8H    sodium chloride (NS) flush 5-40 mL  5-40 mL IntraVENous PRN    ondansetron (ZOFRAN) injection 4 mg  4 mg IntraVENous Q6H PRN    atorvastatin (LIPITOR) tablet 80 mg  80 mg Oral QHS    levothyroxine (SYNTHROID) tablet 112 mcg  112 mcg Oral ACB     ______________________________________________________________________  EXPECTED LENGTH OF STAY: - - -  ACTUAL LENGTH OF STAY:          [address] Yaquelin Padron MD   Pulmonary/CCM  [address]  [phone]  9/25/2022

## 2022-09-25 NOTE — PROGRESS NOTES
0700: Bedside and shift report received from [address] (Offgoing nurse). 0800: Shift assessment completed. Pt is oriented to person and place but disoriented to situation and time. Pupils are equal, round and reactive. NIH stroke scale being done Q hour.  are unequal R > L. Pt is calm and cooperative. Pt follows commands and can move all extremities. Pt is sinus rhythm on the monitor. Pt has a pacemaker. Lungs sounds are clear/diminished bilaterally. Abdomen is soft and non-tender. Bowel sounds are active. Pt had a BM that is slightly maroon color. Fecal occult for blood sent. Pt has a [place] that is patent and draining. UOP adequate. 1200: Fecal occult blood test positive and Hgb 6.9. Per Dr. Lauren White, GI consult needed and pt to receive one unit of blood. 1200: Shift reassessment completed. See changes on flow sheet. 1330: Dextrose 10% infusion given @ 250 mL over 20 minutes for BS less than 60.     1400: BS rechecked - 62. Per Dr. Lauren White, repeat Dextrose 10% infusion 250 mL over 20 minutes, then 20 mL/hr after that.     1600: Shift reassessment completed. See changes on flow sheet. 1700: Dr. Lauren White at bedside and spoke with family. Consent for blood transfusion completed and is in chart. Dr. Genevieve Rodriguez at bedside, will follow pt but no interventions at this time. 1900: Shift report given to Melonie Rubalcava RN (oncoming nurse).

## 2022-09-25 NOTE — CONSULTS
Gastroenterology Consult     Referring Physician:    Consult Date: 9/25/2022     Subjective:     Chief Complaint: acute blood loss anemia    History of Present Illness: Monse Singer is a 80 y.o. female who is seen in consultation for acute blood loss anemia. Called urgently to bedside. Discussed case with RN and ICU attending Dr. Anna Griffith. EGD 9/20/2022 at [facility] - via care everywhere - reportedly normal EGD no sign of bleeding     Outside [facility] with severe pulmonary artery hypertension >60 via [facility] in September. 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 This morning she had one or two brown stools with maroon coloring in it. No hematemesis. No abd pain. No change in vital signs per RN. She feels mildly fatigued but otherwise much better than yesterday. Past Medical History:   Diagnosis Date    Arthritis     Diabetes ([address]     Hard of hearing     Hypertension     Hypothyroidism     Poor historian      Past Surgical History:   Procedure Laterality Date    HX TUBAL LIGATION      IR PERC ART MECH THROMB INFUSION INTRACRANIAL  5/18/2022      No family history on file.   Social History     Tobacco Use    Smoking status: Former    Smokeless tobacco: Not on file   Substance Use Topics    Alcohol use: No      No Known Allergies  Current Facility-Administered Medications   Medication Dose Route Frequency    levETIRAcetam (KEPPRA) tablet 250 mg  250 mg Oral BID    pantoprazole (PROTONIX) 40 mg in 0.9% sodium chloride 10 mL injection  40 mg IntraVENous Q12H    0.9% sodium chloride infusion 250 mL  250 mL IntraVENous PRN    glucose chewable tablet 16 g  4 Tablet Oral PRN    glucagon (GLUCAGEN) injection 1 mg  1 mg IntraMUSCular PRN    dextrose 10% infusion 0-250 mL  0-250 mL IntraVENous PRN    dextrose 10% 10 % infusion        dextrose 10% infusion  20 mL/hr IntraVENous CONTINUOUS    amiodarone (CORDARONE) 375 mg/250 mL D5W infusion  0.5 mg/min IntraVENous CONTINUOUS    sodium chloride (NS) flush 5-40 mL  5-40 mL IntraVENous Q8H    sodium chloride (NS) flush 5-40 mL  5-40 mL IntraVENous PRN    ondansetron (ZOFRAN) injection 4 mg  4 mg IntraVENous Q6H PRN    atorvastatin (LIPITOR) tablet 80 mg  80 mg Oral QHS    levothyroxine (SYNTHROID) tablet 112 mcg  112 mcg Oral ACB        Review of Systems:  14pt Ros neg except per HPI      Objective:     Physical Exam:  Visit Vitals  BP (!) 156/55   Pulse 65   Temp 97.8 °F (36.6 °C)   Resp 20   SpO2 93%      Gen: elderly lady no distress  Skin:  Extremities and face reveal no rashes. No sin erythema. No telangiectasias on the chest wall. HEENT: Sclerae anicteric. Extra-occular muscles are intact. No oral ulcers. No abnormal pigmentation of the lips. The neck is supple. Cardiovascular: regular rhythm rate normal. No murmurs, gallops, or rubs. Respiratory:  Comfortable breathing with no accessory muscle use. Clear breath sounds with no wheezes, rales, or rhonchi. GI:  Abdomen nondistended, soft, and nontender. Normal active bowel sounds. No enlargement of the liver or spleen. No masses palpable. Rectal:  Deferred. Discussed stools with RN who witnessed  Musculoskeletal:  No pitting edema of the lower legs. Extremities have good range of motion. No costovertebral tenderness. Neurological:  Gross memory appears intact. Patient is alert and oriented. CN 2-12 appreciated and grossly normal.  Psychiatric:  Mood appears appropriate with judgement intact. Lymphatic:  No cervical or supraclavicular adenopathy.     Laboratory:    Recent Results (from the past 24 hour(s))   CBC W/O DIFF    Collection Time: 09/25/22 10:00 AM   Result Value Ref Range    WBC 6.4 3.6 - 11.0 K/uL    RBC 2.38 (L) 3.80 - 5.20 M/uL    HGB 6.9 (L) 11.5 - 16.0 g/dL    HCT 22.3 (L) 35.0 - 47.0 %    MCV 93.7 80.0 - 99.0 FL    MCH 29.0 26.0 - 34.0 PG    MCHC 30.9 30.0 - 36.5 g/dL    RDW 14.4 11.5 - 14.5 %    PLATELET 812 696 - 897 K/uL    MPV 10.8 8.9 - 12.9 FL    NRBC 0.0 0  WBC    ABSOLUTE NRBC 0.00 0.00 - 9.92 K/uL   METABOLIC PANEL, BASIC    Collection Time: 09/25/22 10:00 AM   Result Value Ref Range    Sodium 145 136 - 145 mmol/L    Potassium 3.8 3.5 - 5.1 mmol/L    Chloride 110 (H) 97 - 108 mmol/L    CO2 32 21 - 32 mmol/L    Anion gap 3 (L) 5 - 15 mmol/L    Glucose 61 (L) 65 - 100 mg/dL    BUN 13 6 - 20 MG/DL    Creatinine 0.81 0.55 - 1.02 MG/DL    BUN/Creatinine ratio 16 12 - 20      GFR est AA >60 >60 ml/min/1.73m2    GFR est non-AA >60 >60 ml/min/1.73m2    Calcium 9.4 8.5 - 10.1 MG/DL   OCCULT BLOOD, STOOL    Collection Time: 09/25/22 11:22 AM   Result Value Ref Range    Occult blood, stool Positive (A) NEG     GLUCOSE, POC    Collection Time: 09/25/22  1:06 PM   Result Value Ref Range    Glucose (POC) 50 (LL) 65 - 117 mg/dL    Performed by Gerry Downing RN    GLUCOSE, POC    Collection Time: 09/25/22  2:03 PM   Result Value Ref Range    Glucose (POC) 63 (L) 65 - 117 mg/dL    Performed by Gerry Downing RN    GLUCOSE, POC    Collection Time: 09/25/22  2:04 PM   Result Value Ref Range    Glucose (POC) 84 65 - 117 mg/dL    Performed by Gerry Downing RN    GLUCOSE, POC    Collection Time: 09/25/22  2:08 PM   Result Value Ref Range    Glucose (POC) 62 (L) 65 - 117 mg/dL    Performed by Gerry Downing RN    GLUCOSE, POC    Collection Time: 09/25/22  3:04 PM   Result Value Ref Range    Glucose (POC) 139 (H) 65 - 117 mg/dL    Performed by Gerry Downing RN      CT Results (most recent):  Results from Hospital Encounter encounter on 09/24/22    CT HEAD WO CONT    Narrative  Indication:  s/p TNKase    Comparison: CT yesterday    Findings: 5 mm axial images were obtained from the skull base through the  vertex.     CT dose reduction was achieved through the use of a standardized protocol  tailored for this examination and automatic exposure control for dose  modulation. The ventricles and cortical sulci are prominent, compatible with age related  volume loss. There is no evidence of intracranial hemorrhage, mass, mass effect,  or acute infarct. There is periventricular white matter disease. No extra-axial  fluid collections are seen. The visualized paranasal sinuses and mastoid air  cells are clear. The orbital structures are unremarkable. No osseous  abnormalities are seen. Impression  1. No evidence of acute infarct or intracranial hemorrhage. No significant  change. 2. Periventricular white matter disease is likely secondary to chronic small  vessel ischemic changes. 05/18/22    ECHO ADULT COMPLETE 05/23/2022 5/23/2022    Interpretation Summary    Left Ventricle: Normal left ventricular systolic function with a visually estimated EF of 70 - 75%. Left ventricle size is normal. Increased wall thickness. Normal wall motion. Abnormal diastolic function. Aortic Valve: Valve structure is normal. Moderate sclerosis of the aortic valve cusp. No stenosis. Tricuspid Valve: Moderate regurgitation. Pulmonary Arteries: Severe pulmonary hypertension present. The estimated PASP is 86 mmHg. Signed by: Leopold Linen, MD on 5/23/2022  1:17 PM          Assessment/Plan:     Active Problems:    Ischemic stroke [facility]) (9/24/2022)       Mrs. 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 Discussed at length with ICU attending Dr. Dillon Rivera RN, and family at bedside. No hemodynamic instability. Awaiting transfusion. Maintain hgb >8 in setting of stroke. Recommend empiric PPI. Excellent news that EGD 9/20/2022 was normal via [facility].   Recommend close monitoring and if recurrent transfusion dependent blood loss, will need endoscopic evaluation. However I suspect as the TNKase is metabolized any oozing throughout the GI tract will cease. She is a very high risk patient for a low-risk procedure and family agrees with conservative monitoring. Will follow.

## 2022-09-25 NOTE — PROGRESS NOTES
Problem: Self Care Deficits Care Plan (Adult)  Goal: *Acute Goals and Plan of Care (Insert Text)  Description: FUNCTIONAL STATUS PRIOR TO ADMISSION: pt reports that she lives with her daughter and granddaughter who care for her. They assist her with bathing/self care. Family was not present to provide details re: PLOF. HOME SUPPORT: The patient lived with daughter and granddaughter who assist her. .    Occupational Therapy Goals  Initiated 9/25/2022   1. Patient will perform self-feeding with supervision/set-up within 7 day(s). 2.  Patient will perform grooming with supervision/set-up within 7 day(s). 3.  Patient will perform upper body bathing with moderate assistance  within 7 day(s). 4.  Patient will perform supine to sit EOB in preparation for adls with maximal assistance within 7 day(s). 5.  Patient will sit EOB with supervision in preparation for self care within 7 days. 6.  Patient will participate in upper extremity therapeutic exercise/activities with supervision/set-up within 7 day(s). 7.  Patient will perform toilet transfer, using best equipment with maximal assistance within 7 days. 8.  Patient will participate in performing [id number] test of UE function, within 7 days. Outcome: Not Met   OCCUPATIONAL THERAPY EVALUATION  Patient: Fanny Harrell (35 y.o. female)  Date: 9/25/2022  Primary Diagnosis: Ischemic stroke [facility]) [I63.9]       Precautions: fall       ASSESSMENT  Based on the objective data described below, the patient presents with recent R MCA stroke in May and [facility]. Pt was admitted due to aphasia and facial droop and given TNKase. Pt may not be a reliable  of her PLOF as she is oriented to self only at this time.   Pt demonstrates weakness LUE (minus approx 75% shoulder ROM ) and when attempting to perform supine to sit pt - was strongly leaning/pushing to the right and requiring max assist X2, but in time and with support, was able to sit EOB with supervision/CGA. She was able to stand and take a few side steps to [facility] with assist X2. Pt is unable to report her PLOF, and family was not present this session;  pt is likely functioning well below her baseline and will benefit from acute OT services. .    Current Level of Function Impacting Discharge (ADLs/self-care): up to max A X2 for adls and mobility    Functional Outcome Measure: the Fugl-Dye Assessment was not performed this date. .  The Barthel Index was performed and indicated 5/100 score indicating \"total dependency\" in adls. Other factors to consider for discharge: prior CVA in May and had rehab, lives with family that pt reports assist her with bathing. Unsure of PLOF, and level of deficit of previous CVA from May. Patient will benefit from skilled therapy intervention to address the above noted impairments. PLAN :  Recommendations and Planned Interventions: self care training, functional mobility training, therapeutic exercise, balance training, visual/perceptual training, therapeutic activities, cognitive retraining, endurance activities, neuromuscular re-education, patient education, home safety training, and family training/education    Frequency/Duration: Patient will be followed by occupational therapy 4 times a week to address goals. Recommendation for discharge: (in order for the patient to meet his/her long term goals)  Therapy up to 5 days/week in SNF setting    This discharge recommendation:  Has not yet been discussed the attending provider and/or case management    IF patient discharges home will need the following DME: tbd       SUBJECTIVE:   Patient stated my daughter and granddaughter help me.     OBJECTIVE DATA SUMMARY:   HISTORY:   Past Medical History:   Diagnosis Date    Arthritis     Diabetes ([address]     Hard of hearing     Hypertension     Hypothyroidism     Poor historian      Past Surgical History:   Procedure Laterality Date    HX TUBAL LIGATION      IR PER[facility]  [address]  5/18/2022     Expanded or extensive additional review of patient history: Hx CVA 5/2022 with  thrombectomy went to rehab and then had [address]: Private residence  One/Two Story Residence: One story  Living Alone: No  Support Systems: Child(piotr), Other Family Member(s) (daughter and granddaughter)  Patient Expects to be Discharged to[de-identified] Home  Current DME Used/Available at Home: Wheelchair, Walker, rolling, Raised toilet seat, Grab bars, Oxygen, portable    Hand dominance: Right    EXAMINATION OF PERFORMANCE DEFICITS:  Cognitive/Behavioral Status:  Neurologic State: Alert  Orientation Level: Oriented to person (knows her birthmonth and day)  Cognition: Follows commands  Perception:  (unable to accurately assess this session)  Perseveration: No perseveration noted  Safety/Judgement: Decreased awareness of environment;Decreased awareness of need for assistance;Decreased awareness of need for safety;Decreased insight into deficits    Skin: generally intact, appears dry    Edema: none observed    Hearing: Auditory  Auditory Impairment: None    Vision/Perceptual:                           Acuity:  (not formally assessed)    Corrective Lenses:  (none present)    Range of Motion:  BUEs:    AROM: RUE:  Generally decreased, functional               LUE:  impaired shoulder flex to approx minus 75%  Able to  once assisted with placing hand on bed rail                      Strength:  LUE decreased // RUE generally functional  Strength: Generally decreased, functional                Coordination:  Coordination: Within functional limits  Fine Motor Skills-Upper: Left Impaired;Right Impaired    Gross Motor Skills-Upper: Left Impaired;Right Impaired (L impaired > R)    Tone & Sensation:  Sensation: not assessed  Tone: Normal                         Balance:  Sitting: Impaired  Sitting - Static: Fair (occasional); Poor (constant support) (initially poor due to active pushing, progressing to fair)  Sitting - Dynamic: Fair (occasional); Poor (constant support) (initially poor due to active pushing, progressing to fair)  Standing: Impaired; With support (RW)  Standing - Static: Fair;Constant support  Standing - Dynamic : Fair;Constant support    Functional Mobility and Transfers for ADLs:  Bed Mobility:  Rolling: Maximum assistance;Assist x2  Supine to Sit: Maximum assistance;Assist x2  Sit to Supine: Maximum assistance;Assist x2  Scooting: Maximum assistance;Assist x2    Transfers:  Sit to Stand: Moderate assistance;Assist x2  Stand to Sit: Moderate assistance;Assist x2    ADL Assessment:  Feeding:  (passed STAND this a.m. and food will be ordered for her) not assessed     Oral Facial Hygiene/Grooming: Maximum assistance    Bathing: Maximum assistance (family assists at baseline)    Type of Bath: Basin/Soap/Water    Upper Body Dressing: Maximum assistance    Lower Body Dressing: Total assistance    Toileting: Total assistance (sol and incontinent of stool)                ADL Intervention and task modifications:        Pt is requiring maximal assistance at this time. Cognitive Retraining  Safety/Judgement: Decreased awareness of environment;Decreased awareness of need for assistance;Decreased awareness of need for safety;Decreased insight into deficits      Functional Measure:    Barthel Index:  Bathin  Bladder: 0  Bowels: 0  Groomin  Dressin  Feedin  Mobility: 0  Stairs: 0  Toilet Use: 0  Transfer (Bed to Chair and Back): 0  Total: 5/100      The Barthel ADL Index: Guidelines  1. The index should be used as a record of what a patient does, not as a record of what a patient could do. 2. The main aim is to establish degree of independence from any help, physical or verbal, however minor and for whatever reason. 3. The need for supervision renders the patient not independent.   4. A patient's performance should be established using the best available evidence. Asking the patient, friends/relatives and nurses are the usual sources, but direct observation and common sense are also important. However direct testing is not needed. 5. Usually the patient's performance over the preceding 24-48 hours is important, but occasionally longer periods will be relevant. 6. Middle categories imply that the patient supplies over 50 per cent of the effort. 7. Use of aids to be independent is allowed. Score Interpretation (from [address] [facility])    Independent   60-79 Minimally independent   40-59 Partially dependent   20-39 Very dependent   <20 Totally dependent     -Pamela Odonnell., Barthel, D.W. (1965). Functional evaluation: the Barthel Index. [address] ([address] (1997). The Barthel activities of daily living index: self-reporting versus actual performance in the old (> or = 75 years). Journal of [address] [place] [address] 45(7), [address] [facility], STEPAN, Eder Taylor., Samreen Tsang. (1999). Measuring the change in disability after inpatient rehabilitation; comparison of the responsiveness of the Barthel Index and Functional Edgar Measure. Journal of Neurology, Neurosurgery, and Psychiatry, 66(4), 587-305. Taran Layne, N.J.A, JONES Hopkins, & Jacinta Faye M.A. (2004) Assessment of post-stroke quality of life in cost-effectiveness studies: The usefulness of the Barthel Index and the EuroQoL-5D.  Quality of Life Research, 15, 214-14        Occupational Therapy Evaluation Charge Determination   History Examination Decision-Making   MEDIUM Complexity : Expanded review of history including physical, cognitive and psychosocial  history  MEDIUM Complexity : 3-5 performance deficits relating to physical, cognitive , or psychosocial skils that result in activity limitations and / or participation restrictions MEDIUM Complexity : Patient may present with comorbidities that affect occupational performnce. Miniml to moderate modification of tasks or assistance (eg, physical or verbal ) with assesment(s) is necessary to enable patient to complete evaluation       Based on the above components, the patient evaluation is determined to be of the following complexity level: MEDIUM  Pain Rating:  No complaints of pain    Activity Tolerance:   Fair    After treatment patient left in no apparent distress:    Supine in bed, Heels elevated for pressure relief, Call bell within reach, Bed / chair alarm activated, Side rails x 3, and nurse present    COMMUNICATION/EDUCATION:   The patients plan of care was discussed with: Physical therapist and Registered nurse. Patient is unable to participate in goal setting and plan of care. This patients plan of care is not appropriate for delegation to JENNIFFER. Will need FM testing performed as appropriate.       Thank you for this referral.  Michaela Barfield OTR/L  Time Calculation: 33 mins

## 2022-09-25 NOTE — CONSULTS
Date of Consultation:  September 25, 2022    Referring Physician: Genaro Andujar MD     Reason for Consultation:  Stroke? Chief Complaint   Patient presents with    Stroke       History of Present Illness:   Luis Pedro is a 80 y.o. female with hx of hypertension, diabetes and recent right MCA stroke s/p thrombectomy who is admitted to the hospital with aphasia and left facial droop. History is obtained from chart review as patient does not remember the events that led her to this hospitalization. It appears that patient had been at her baseline state of health when she suddenly developed weakness on the left side as well as trouble with speech. She was brought to the hospital for further evaluation. She did undergo evaluation for possible stroke with a noncontrast head CT that showed no acute hemorrhage and she was given TNKase. She did have a CTA with perfusion that showed no large vessel occlusion. She was admitted to the hospital for further evaluation. It appears that her symptoms resolved shortly after arrival in the emergency room. This morning patient reports that she is doing well. She does not remember the events that led her to this hospitalization however was able to tell me about her stroke in May. Of note it is also reported that patient had been on Eliquis for anticoagulation due to her atrial fibrillation however this was stopped 2 weeks ago due to concerns for possible GI bleed. Past Medical History:   Diagnosis Date    Arthritis     Diabetes ([address]     Hard of hearing     Hypertension     Hypothyroidism     Poor historian         Past Surgical History:   Procedure Laterality Date    HX TUBAL LIGATION      IR [facility] THROMB INFUSION INTRACRANIAL  5/18/2022        No family history on file.      Social History     Tobacco Use    Smoking status: Former    Smokeless tobacco: Not on file   Substance Use Topics    Alcohol use: No        No Known Allergies     Prior to Admission medications    Medication Sig Start Date End Date Taking? Authorizing Provider   aspirin (ASPIRIN) 325 mg tablet Take 1 Tablet by mouth daily. 5/24/22  Yes Davidson Dawson MD   levothyroxine (SYNTHROID) 112 mcg tablet Take 1 Tablet by mouth Daily (before breakfast). 5/24/22  Yes Dvaidson Dawson MD   levETIRAcetam (KEPPRA) 250 mg tablet Take 1 Tablet by mouth two (2) times a day. 5/23/22  Yes Davidson Dawson MD   timolol (TIMOPTIC) 0.5 % ophthalmic solution Administer 1 Drop to both eyes two (2) times a day. 5/23/22  Yes Davidson Dawson MD   brimonidine-timoloL (COMBIGAN) 0.2-0.5 % drop ophthalmic solution Administer 1 Drop to both eyes two (2) times a day. Yes Provider, Historical   acetaminophen (TYLENOL) 325 mg tablet Take 2 Tablets by mouth every four (4) hours as needed for Fever (For temp greater than or equal to 38.5 C or 101.3 F (Unless hepatic failure or contrindicated). Give first line for fever. ). 5/23/22   Davidson Dawson MD   atorvastatin (LIPITOR) 80 mg tablet Take 1 Tablet by mouth daily. Patient not taking: Reported on 9/24/2022 5/24/22   Davidson Dawson MD   amLODIPine (NORVASC) 10 mg tablet Take 1 Tablet by mouth daily. Patient not taking: Reported on 9/24/2022 5/24/22   Davidson Dawson MD   losartan (COZAAR) 100 mg tablet Take 100 mg by mouth daily. Patient not taking: Reported on 9/24/2022    Provider, Historical   torsemide (DEMADEX) 10 mg tablet Take 10 mg by mouth daily.   Patient not taking: Reported on 9/24/2022    Provider, Historical       Review of Systems:    General, constitutional: negative  Eyes, vision: negative  Ears, nose, throat: negative  Cardiovascular, heart: negative  Respiratory: negative  Gastrointestinal: negative  Genitourinary: negative  Musculoskeletal: negative  Skin and integumentary: negative  Psychiatric: negative  Endocrine: negative  Neurological: negative, except for HPI  Hematologic/lymphatic: negative  Allergy/immunology: negative    PHYSICAL EXAMINATION:   Visit Vitals  BP (!) 172/66 (BP 1 Location: Right upper arm, BP Patient Position: At rest;Lying)   Pulse 76   Temp 97.8 °F (36.6 °C)   Resp 17   SpO2 100%       Physical Exam:  General:  no acute distress  Neck: no carotid bruits  Lungs: clear to auscultation  Heart:  no murmurs, regular rate and rhythm   Lower extremity: no edema    Neurological exam:  Mental Status: Awake, alert, oriented to person, place October stated that  Speech and Language: Mild dysarthria. Able to name, repeat and follow commands   Fund of knowledge was preserved    Cranial nerves: II-XII  Pupils equal and reactive, visual fields intact by confrontation   Extraocular movements intact, no evidence of nystagmus or ptosis   Facial sensation intact   Facial movements symmetric   Hearing intact to soft rub bilaterally   Shoulder shrug symmetric and strong   Tongue protrusion full and midline without fasciculation or atrophy    Motor:   Normal tone and Bulk   Drift: No evidence of pronator drift     Strength testing:   deltoid triceps biceps Wrist ext. Wrist flex. intrinsics   Right 5 5 5 5 5 5   Left 5 5 5 5 5 5     Patient had some difficulty with hearing and following commands when checking strength of the lower extremities. She was able to lift her right lower extremity antigravity with no drift. Her left lower extremity had difficulty with antigravity movements. It also appeared that she had left foot drop. Sensory:  Sensation was intact to light touch per patient.     Reflexes:   Biceps Triceps  Brachiorad Patellar Achilles Plantar Hoffmans   Right  2 1 2 1 - Down Neg   Left  2 1 2 1 - Down Neg      Cerebellar testing: Finger-nose-finger was intact    Gait was deferred as patient was in the ICU    LAB DATA REVIEWED:    Lab Results   Component Value Date/Time    Hemoglobin A1c 5.8 (H) 05/18/2022 05:07 AM    Sodium 145 09/25/2022 10:00 AM    Potassium 3.8 09/25/2022 10:00 AM    Chloride 110 (H) 09/25/2022 10:00 AM    Glucose 61 (L) 09/25/2022 10:00 AM    BUN 13 09/25/2022 10:00 AM    Creatinine 0.81 09/25/2022 10:00 AM    Calcium 9.4 09/25/2022 10:00 AM    WBC 6.4 09/25/2022 10:00 AM    HCT 22.3 (L) 09/25/2022 10:00 AM    HGB 6.9 (L) 09/25/2022 10:00 AM    PLATELET 311 99/91/1061 10:00 AM       Stroke workup    MRI Brain  Pending    CTA head  Independent review of the CTA head and neck revealed mild atherosclerotic disease in the anterior and posterior circulation. There is no flow-limiting stenosis. TTE:   Pending    Stroke labs:    HgBA1c    Lab Results   Component Value Date/Time    Hemoglobin A1c 5.8 (H) 05/18/2022 05:07 AM       LDL   Lab Results   Component Value Date/Time    LDL, calculated 111.8 (H) 05/18/2022 05:07 AM       EKG: A. fib      Assessment and Plan:   Junior Ortiz is a 80 y.o. female with hx of hypertension, diabetes and recent right MCA stroke s/p thrombectomy who is admitted to the hospital with aphasia and left facial droop concerning for possible stroke status post TNKase. It appears her neurological symptoms did improve throughout the course the admission. Differential does include seizure    Left-sided weakness associated with difficulty with speech: Etiology is concerning for possible stroke as she is not currently on anticoagulation   - Recommend maintaining SBP <220/120 for 24 hrs from symptom onset and then BP goal is less than 140/90 (this is the long term goal)   - Would hold off on any antiplatelet and anticoagulation for 24 hours after receiving TNKase.  - Once a noncontrast head CT was repeated and shows no evidence of hemorrhage, she can be started on aspirin 81 mg daily for secondary stroke prevention.   As she does have a history of atrial fibrillation we will need to consider anticoagulation however she may need further evaluation of possible GI bleed  -Patient will repeat head CT 24 hours after receiving TNK  - Risk factor modification: would recommend obtaining lipid panel and A1c    - if LDL > 70, would start atorvastatin 40mg daily   - Would obtain MRI brain without contrast to rule out acute ischemia   - please obtain TTE to rule out intracardiac thrombus   - would monitor on telemetry to rule out arrhythmias    - please obtain PT/OT and speech consultations    Atrial fibrillation: Off anticoagulation due to drop in hemoglobin which is concerning for possible GI bleed.  -We will need to consider restarting anticoagulation after evaluation for her drop in hemoglobin    We discussed extensively the importance of lifestyle modification including smoking cessation, diet, and incorporating exercise into daily routine. Thank you for the consult, we will continue to follow. Please call with any additional questions. Pati Hernadez MD     35 minutes was spent providing medical care of this critically ill patient reviewing records, obtaining additional history from family, examining patient , discussing with collaborating physicians and nursing, and discussing treatment plans.

## 2022-09-25 NOTE — PROGRESS NOTES
Problem: Mobility Impaired (Adult and Pediatric)  Goal: *Acute Goals and Plan of Care (Insert Text)  Description: FUNCTIONAL STATUS PRIOR TO ADMISSION: Pt oriented to self only therefore question accuracy of report. However, pt reports ambulating with use of rolling walker, also states she uses a wheelchair? Wears 2L O2 at baseline. Receiving HHPT after returning home from rehab ([facility] May 2022)    HOME SUPPORT PRIOR TO ADMISSION: The patient lived with daughter and granddaughter, pt unable to report how much assist/supervision they provide. Physical Therapy Goals  Initiated 9/25/2022  1. Patient will move from supine to sit and sit to supine , scoot up and down, and roll side to side in bed with minimal assistance  within 7 day(s). 2.  Patient will transfer from bed to chair and chair to bed with minimal assistance using the least restrictive device within 7 day(s). 3.  Patient will perform sit to stand with minimal assistance within 7 day(s). 4.  Patient will ambulate with minimal assistance for 25 feet with the least restrictive device within 7 day(s). 6.  Patient will improve Santana Balance score by 7 points within 7 days. Outcome: Progressing Towards Goal   PHYSICAL THERAPY EVALUATION  Patient: Lazarus Salmons (89 y.o. female)  Date: 9/25/2022  Primary Diagnosis: Ischemic stroke [facility]) [I63.9]       Precautions:       ASSESSMENT  Based on the objective data described below, the patient presents with increased confusion, impaired activity tolerance, impaired sitting and standing balance, residual L sided weakness from previous CVA, hypertension, decreased coordination, and overall impaired functional mobility. Pt received supine in bed, oriented to self only, agreeable to participation with therapy. Pt required maxAx2 in order to assume seated position EOB.  Pt noted to be extremely rigid in LUE and actively pushing towards R side upon assuming seated position EOB, requiring max-totalAx2 to maintain midline. However, with time, sitting balance improved with pt requiring supervision only, and no active pushing noted. Pt sit>>stand w/ modAx2 and sidestepped along EOB w/ RW and Mary x2. Pt with increased difficulty advancing LLE during gait and demonstrated increased trunk flexion and posterior lean. Pt fatigued quickly with minimal activity and was returning to supine position in bed at completion of therapy session. Will need further clarification of pt's prior level of function as well as available family assist however recommend additional rehab at discharge. Current Level of Function Impacting Discharge (mobility/balance): maxAx2 for bed mobility, modAx2 for sit<>stand, minAx2 w/ RW while sidestepping EOB    Functional Outcome Measure: The patient scored 1/56 on the Santana Balance test outcome measure which is indicative of high falls risk. Other factors to consider for discharge: confusion, hx of CVA, falls risk, poor activity tolerance     Patient will benefit from skilled therapy intervention to address the above noted impairments. PLAN :  Recommendations and Planned Interventions: bed mobility training, transfer training, gait training, therapeutic exercises, patient and family training/education, and therapeutic activities      Frequency/Duration: Patient will be followed by physical therapy:  4 times a week to address goals. Recommendation for discharge: (in order for the patient to meet his/her long term goals)  Therapy up to 5 days/week in SNF setting    This discharge recommendation:  Has been made in collaboration with the attending provider and/or case management    IF patient discharges home will need the following DME: to be determined (TBD)         SUBJECTIVE:   Patient stated The year is Sunday.     OBJECTIVE DATA SUMMARY:   HISTORY:    Past Medical History:   Diagnosis Date    Arthritis     Diabetes ([address]     Hard of hearing     Hypertension     Hypothyroidism     Poor historian      Past Surgical History:   Procedure Laterality Date    HX TUBAL LIGATION      IR [facility] THROMB INFUSION INTRACRANIAL  5/18/2022       Personal factors and/or comorbidities impacting plan of care: hx of CVA s/p thrombectomy    Home Situation  Home Environment: Private residence  One/Two Story Residence: One story  Living Alone: No  Support Systems: Child(piotr), Other Family Member(s) (daughter and granddaughter)  Patient Expects to be Discharged to[de-identified] Home  Current DME Used/Available at Home: Wheelchair, Walker, rolling, Raised toilet seat, Grab bars, Oxygen, portable    EXAMINATION/PRESENTATION/DECISION MAKING:   Critical Behavior:  Neurologic State: Alert  Orientation Level: Oriented to person, Oriented to place, Disoriented to situation, Disoriented to time  Cognition: Follows commands, Recognition of people/places     Hearing: Auditory  Auditory Impairment: None  Skin:  intact  Edema: none noted  Range Of Motion:  AROM: Generally decreased, functional                       Strength:    Strength: Generally decreased, functional                    Tone & Sensation:   Tone: Normal                              Coordination:  Coordination: Within functional limits  Vision:      Functional Mobility:  Bed Mobility:  Rolling: Maximum assistance;Assist x2  Supine to Sit: Maximum assistance;Assist x2  Sit to Supine: Maximum assistance;Assist x2  Scooting: Maximum assistance;Assist x2  Transfers:  Sit to Stand: Moderate assistance;Assist x2  Stand to Sit: Moderate assistance;Assist x2                       Balance:   Sitting: Impaired  Sitting - Static: Fair (occasional); Poor (constant support) (initially poor due to active pushing, progressing to fair)  Sitting - Dynamic: Fair (occasional); Poor (constant support) (initially poor due to active pushing, progressing to fair)  Standing: Impaired; With support (RW)  Standing - Static: Fair;Constant support  Standing - Dynamic : Fair;Constant support  Ambulation/Gait Training:  Distance (ft): 2 Feet (ft)  Assistive Device: Walker, rolling;Gait belt  Ambulation - Level of Assistance: Minimal assistance;Assist x2        Gait Abnormalities: Decreased step clearance;Shuffling gait (strong posterior lean)              Speed/Nadine: Shuffled;Pace decreased (<100 feet/min)  Step Length: Left shortened;Right shortened                      Functional Measure:  Santana Balance Test:    Sitting to Standin  Standing Unsupported: 0  Sitting with Back Unsupported: 1  Standing to Sittin  Transfers: 0  Standing Unsupported with Eyes Closed: 0  Standing Unsupported with Feet Together: 0  Reach Forward with Outstretched Arm: 0   Object: 0  Turn to Look Over Shoulders: 0  Turn 360 Degrees: 0  Alternate Foot on Step/Stool: 0  Standing Unsupported One Foot in Front: 0  Stand on One Le  Total:          56=Maximum possible score;   0-20=High fall risk  21-40=Moderate fall risk   41-56=Low fall risk               Physical Therapy Evaluation Charge Determination   History Examination Presentation Decision-Making   MEDIUM  Complexity : 1-2 comorbidities / personal factors will impact the outcome/ POC  MEDIUM Complexity : 3 Standardized tests and measures addressing body structure, function, activity limitation and / or participation in recreation  MEDIUM Complexity : Evolving with changing characteristics  MEDIUM Complexity : FOTO score of 26-74      Based on the above components, the patient evaluation is determined to be of the following complexity level: MEDIUM    Pain Rating:  Did not report pain    Activity Tolerance:   Hypertensive however BP stable on RA    After treatment patient left in no apparent distress:   Supine in bed, Heels elevated for pressure relief, Call bell within reach, Bed / chair alarm activated, and Side rails x 3    COMMUNICATION/EDUCATION:   The patients plan of care was discussed with: Occupational therapist and Registered nurse. Fall prevention education was provided and the patient/caregiver indicated understanding., Patient/family have participated as able in goal setting and plan of care. , and Patient/family agree to work toward stated goals and plan of care.     Thank you for this referral.  Avani Dejesus, PT, DPT   Time Calculation: 32 mins

## 2022-09-26 LAB
ALBUMIN SERPL-MCNC: 2.2 G/DL (ref 3.5–5)
ALBUMIN/GLOB SERPL: 0.6 {RATIO} (ref 1.1–2.2)
ALP SERPL-CCNC: 84 U/L (ref 45–117)
ALT SERPL-CCNC: 34 U/L (ref 12–78)
ANION GAP SERPL CALC-SCNC: 1 MMOL/L (ref 5–15)
AST SERPL-CCNC: 40 U/L (ref 15–37)
BILIRUB SERPL-MCNC: 1.3 MG/DL (ref 0.2–1)
BUN SERPL-MCNC: 11 MG/DL (ref 6–20)
BUN/CREAT SERPL: 14 (ref 12–20)
CALCIUM SERPL-MCNC: 9 MG/DL (ref 8.5–10.1)
CHLORIDE SERPL-SCNC: 109 MMOL/L (ref 97–108)
CHOLEST SERPL-MCNC: 96 MG/DL
CO2 SERPL-SCNC: 31 MMOL/L (ref 21–32)
CREAT SERPL-MCNC: 0.81 MG/DL (ref 0.55–1.02)
ERYTHROCYTE [DISTWIDTH] IN BLOOD BY AUTOMATED COUNT: 14 % (ref 11.5–14.5)
ERYTHROCYTE [DISTWIDTH] IN BLOOD BY AUTOMATED COUNT: 14.2 % (ref 11.5–14.5)
EST. AVERAGE GLUCOSE BLD GHB EST-MCNC: ABNORMAL MG/DL
GLOBULIN SER CALC-MCNC: 3.5 G/DL (ref 2–4)
GLUCOSE BLD STRIP.AUTO-MCNC: 103 MG/DL (ref 65–117)
GLUCOSE BLD STRIP.AUTO-MCNC: 122 MG/DL (ref 65–117)
GLUCOSE BLD STRIP.AUTO-MCNC: 84 MG/DL (ref 65–117)
GLUCOSE BLD STRIP.AUTO-MCNC: 94 MG/DL (ref 65–117)
GLUCOSE SERPL-MCNC: 99 MG/DL (ref 65–100)
HBA1C MFR BLD: <3.8 % (ref 4–5.6)
HCT VFR BLD AUTO: 20.7 % (ref 35–47)
HCT VFR BLD AUTO: 24.9 % (ref 35–47)
HDLC SERPL-MCNC: 36 MG/DL
HDLC SERPL: 2.7 {RATIO} (ref 0–5)
HGB BLD-MCNC: 6.5 G/DL (ref 11.5–16)
HGB BLD-MCNC: 7 G/DL (ref 11.5–16)
HGB BLD-MCNC: 7.8 G/DL (ref 11.5–16)
HGB BLD-MCNC: 8 G/DL (ref 11.5–16)
LDLC SERPL CALC-MCNC: 48.4 MG/DL (ref 0–100)
MAGNESIUM SERPL-MCNC: 2 MG/DL (ref 1.6–2.4)
MCH RBC QN AUTO: 29.3 PG (ref 26–34)
MCH RBC QN AUTO: 29.3 PG (ref 26–34)
MCHC RBC AUTO-ENTMCNC: 31.3 G/DL (ref 30–36.5)
MCHC RBC AUTO-ENTMCNC: 31.4 G/DL (ref 30–36.5)
MCV RBC AUTO: 93.2 FL (ref 80–99)
MCV RBC AUTO: 93.6 FL (ref 80–99)
NRBC # BLD: 0 K/UL (ref 0–0.01)
NRBC # BLD: 0 K/UL (ref 0–0.01)
NRBC BLD-RTO: 0 PER 100 WBC
NRBC BLD-RTO: 0 PER 100 WBC
PHOSPHATE SERPL-MCNC: 2.4 MG/DL (ref 2.6–4.7)
PLATELET # BLD AUTO: 196 K/UL (ref 150–400)
PLATELET # BLD AUTO: 201 K/UL (ref 150–400)
PMV BLD AUTO: 10.4 FL (ref 8.9–12.9)
PMV BLD AUTO: 11 FL (ref 8.9–12.9)
POTASSIUM SERPL-SCNC: 3.8 MMOL/L (ref 3.5–5.1)
PROT SERPL-MCNC: 5.7 G/DL (ref 6.4–8.2)
RBC # BLD AUTO: 2.22 M/UL (ref 3.8–5.2)
RBC # BLD AUTO: 2.66 M/UL (ref 3.8–5.2)
SERVICE CMNT-IMP: ABNORMAL
SERVICE CMNT-IMP: NORMAL
SODIUM SERPL-SCNC: 141 MMOL/L (ref 136–145)
TRIGL SERPL-MCNC: 58 MG/DL (ref ?–150)
VLDLC SERPL CALC-MCNC: 11.6 MG/DL
WBC # BLD AUTO: 8 K/UL (ref 3.6–11)
WBC # BLD AUTO: 8.4 K/UL (ref 3.6–11)

## 2022-09-26 PROCEDURE — 97530 THERAPEUTIC ACTIVITIES: CPT | Performed by: OCCUPATIONAL THERAPIST

## 2022-09-26 PROCEDURE — 65270000046 HC RM TELEMETRY

## 2022-09-26 PROCEDURE — 83735 ASSAY OF MAGNESIUM: CPT

## 2022-09-26 PROCEDURE — 92610 EVALUATE SWALLOWING FUNCTION: CPT

## 2022-09-26 PROCEDURE — 76937 US GUIDE VASCULAR ACCESS: CPT

## 2022-09-26 PROCEDURE — 74011250637 HC RX REV CODE- 250/637: Performed by: NURSE PRACTITIONER

## 2022-09-26 PROCEDURE — 85027 COMPLETE CBC AUTOMATED: CPT

## 2022-09-26 PROCEDURE — 74011000250 HC RX REV CODE- 250: Performed by: HOSPITALIST

## 2022-09-26 PROCEDURE — C9113 INJ PANTOPRAZOLE SODIUM, VIA: HCPCS | Performed by: HOSPITALIST

## 2022-09-26 PROCEDURE — 80053 COMPREHEN METABOLIC PANEL: CPT

## 2022-09-26 PROCEDURE — 83036 HEMOGLOBIN GLYCOSYLATED A1C: CPT

## 2022-09-26 PROCEDURE — 82962 GLUCOSE BLOOD TEST: CPT

## 2022-09-26 PROCEDURE — 77010033678 HC OXYGEN DAILY

## 2022-09-26 PROCEDURE — 99291 CRITICAL CARE FIRST HOUR: CPT | Performed by: PSYCHIATRY & NEUROLOGY

## 2022-09-26 PROCEDURE — 74011250637 HC RX REV CODE- 250/637: Performed by: INTERNAL MEDICINE

## 2022-09-26 PROCEDURE — 84100 ASSAY OF PHOSPHORUS: CPT

## 2022-09-26 PROCEDURE — 36415 COLL VENOUS BLD VENIPUNCTURE: CPT

## 2022-09-26 PROCEDURE — 74011000250 HC RX REV CODE- 250: Performed by: INTERNAL MEDICINE

## 2022-09-26 PROCEDURE — 80061 LIPID PANEL: CPT

## 2022-09-26 PROCEDURE — 74011250636 HC RX REV CODE- 250/636: Performed by: HOSPITALIST

## 2022-09-26 PROCEDURE — 74011250637 HC RX REV CODE- 250/637: Performed by: HOSPITALIST

## 2022-09-26 PROCEDURE — 74011250636 HC RX REV CODE- 250/636

## 2022-09-26 PROCEDURE — 85018 HEMOGLOBIN: CPT

## 2022-09-26 RX ORDER — FLUTICASONE PROPIONATE 50 MCG
SPRAY, SUSPENSION (ML) NASAL
COMMUNITY
Start: 2022-08-31

## 2022-09-26 RX ORDER — TORSEMIDE 20 MG/1
20 TABLET ORAL DAILY
COMMUNITY
End: 2022-09-29

## 2022-09-26 RX ORDER — LOSARTAN POTASSIUM 25 MG/1
75 TABLET ORAL DAILY
COMMUNITY
End: 2022-09-29

## 2022-09-26 RX ORDER — LORATADINE 10 MG/1
10 TABLET ORAL DAILY
COMMUNITY
Start: 2022-06-24

## 2022-09-26 RX ORDER — BUDESONIDE AND FORMOTEROL FUMARATE DIHYDRATE 80; 4.5 UG/1; UG/1
2 AEROSOL RESPIRATORY (INHALATION) DAILY
COMMUNITY
Start: 2022-09-23

## 2022-09-26 RX ORDER — LEVETIRACETAM 100 MG/ML
250 SOLUTION ORAL 2 TIMES DAILY
COMMUNITY
End: 2022-09-29

## 2022-09-26 RX ORDER — BRIMONIDINE TARTRATE 2 MG/ML
1 SOLUTION/ DROPS OPHTHALMIC EVERY 12 HOURS
Status: DISCONTINUED | OUTPATIENT
Start: 2022-09-26 | End: 2022-09-29 | Stop reason: HOSPADM

## 2022-09-26 RX ORDER — GABAPENTIN 100 MG/1
100 CAPSULE ORAL 3 TIMES DAILY
COMMUNITY
Start: 2022-08-22

## 2022-09-26 RX ORDER — AMLODIPINE BESYLATE 5 MG/1
5 TABLET ORAL DAILY
COMMUNITY

## 2022-09-26 RX ORDER — GUAIFENESIN 100 MG/5ML
81 LIQUID (ML) ORAL DAILY
Status: DISCONTINUED | OUTPATIENT
Start: 2022-09-26 | End: 2022-09-29 | Stop reason: HOSPADM

## 2022-09-26 RX ORDER — GUAIFENESIN 100 MG/5ML
81 LIQUID (ML) ORAL DAILY
COMMUNITY
End: 2022-09-29

## 2022-09-26 RX ORDER — PANTOPRAZOLE SODIUM 40 MG/1
TABLET, DELAYED RELEASE ORAL
COMMUNITY
Start: 2022-09-14 | End: 2022-09-29

## 2022-09-26 RX ORDER — POTASSIUM CHLORIDE 20MEQ/15ML
LIQUID (ML) ORAL
COMMUNITY
Start: 2022-08-31 | End: 2022-09-29

## 2022-09-26 RX ORDER — TIMOLOL MALEATE 5 MG/ML
1 SOLUTION/ DROPS OPHTHALMIC EVERY 12 HOURS
Status: DISCONTINUED | OUTPATIENT
Start: 2022-09-26 | End: 2022-09-29 | Stop reason: HOSPADM

## 2022-09-26 RX ORDER — LEVOTHYROXINE SODIUM 125 UG/1
125 TABLET ORAL DAILY
COMMUNITY
Start: 2022-08-22

## 2022-09-26 RX ORDER — FERROUS SULFATE TAB 325 MG (65 MG ELEMENTAL FE) 325 (65 FE) MG
325 TAB ORAL 2 TIMES DAILY WITH MEALS
COMMUNITY
Start: 2022-08-22

## 2022-09-26 RX ADMIN — LEVETIRACETAM 250 MG: 250 TABLET, FILM COATED ORAL at 08:22

## 2022-09-26 RX ADMIN — ATORVASTATIN CALCIUM 80 MG: 40 TABLET, FILM COATED ORAL at 23:10

## 2022-09-26 RX ADMIN — PANTOPRAZOLE SODIUM 40 MG: 40 INJECTION, POWDER, FOR SOLUTION INTRAVENOUS at 08:22

## 2022-09-26 RX ADMIN — BRIMONIDINE TARTRATE 1 DROP: 2 SOLUTION OPHTHALMIC at 23:21

## 2022-09-26 RX ADMIN — PANTOPRAZOLE SODIUM 40 MG: 40 INJECTION, POWDER, FOR SOLUTION INTRAVENOUS at 23:10

## 2022-09-26 RX ADMIN — BRIMONIDINE TARTRATE 1 DROP: 2 SOLUTION OPHTHALMIC at 11:34

## 2022-09-26 RX ADMIN — LEVETIRACETAM 250 MG: 250 TABLET, FILM COATED ORAL at 17:35

## 2022-09-26 RX ADMIN — ASPIRIN 81 MG: 81 TABLET, CHEWABLE ORAL at 11:34

## 2022-09-26 RX ADMIN — SODIUM CHLORIDE, PRESERVATIVE FREE 10 ML: 5 INJECTION INTRAVENOUS at 14:18

## 2022-09-26 RX ADMIN — DIBASIC SODIUM PHOSPHATE, MONOBASIC POTASSIUM PHOSPHATE AND MONOBASIC SODIUM PHOSPHATE 1 TABLET: 852; 155; 130 TABLET ORAL at 05:27

## 2022-09-26 RX ADMIN — LEVOTHYROXINE SODIUM 112 MCG: 0.11 TABLET ORAL at 07:22

## 2022-09-26 RX ADMIN — TIMOLOL MALEATE 1 DROP: 5 SOLUTION/ DROPS OPHTHALMIC at 11:34

## 2022-09-26 RX ADMIN — SODIUM CHLORIDE, PRESERVATIVE FREE 10 ML: 5 INJECTION INTRAVENOUS at 05:27

## 2022-09-26 RX ADMIN — TIMOLOL MALEATE 1 DROP: 5 SOLUTION/ DROPS OPHTHALMIC at 23:17

## 2022-09-26 RX ADMIN — SODIUM CHLORIDE, PRESERVATIVE FREE 10 ML: 5 INJECTION INTRAVENOUS at 23:25

## 2022-09-26 NOTE — PROGRESS NOTES
Critical Care Progress Note  Stefanie Garcia MD          Date of Service:  2022  NAME:  Caterina Flores  :  1928  MRN:  [id number]      Subjective/Hospital course:      : Patient reports feeling fine. She is a bit confused and disoriented. No acute events overnight.   : Patient looks the same as yesterday. No acute events overnight. No signs of overt GI bleed overnight. Yesterday she had some maroon colored stools. Problem list:     -Acute ischemic stroke.  -DM2.  -HTN. -Hypothyroidism.  -Hard of hearing. -H/o GI bleed. Assessment/Plan:     Acute ischemic stroke. - Status post thrombolysis. - Serial neuro exams.  - Consulted neurology. - Obtain CT head 24 hours after tPA. - Obtain MRI without contrast.  - Obtain 2D echo. - Monitor for A. fib. - If there is any acute change in neurological status then obtain stat CT head. - Speech therapy consult for swallowing evaluation.  - Consult PT/OT. - Check fasting lipid profile. - On high intensity statins. - Check HbA1c.  - Monitor blood pressure. - Hold off on anticoagulation/antiplatelet therapy for 24 hours until the repeat CT head is done and bleeding is ruled out. GI bleed:  -Hb stabilized. -Hold off on [facility] [place], can start aspirin if ok with GI today. Hypothyroidism:  -Continue levothyroxine. HTN:  -Holding home BP meds for now. Code status: Full code  SUP: NA  DVT prophylaxis: SCD    Transfer out of ICU today. I personally spent --- minutes of critical care time. This is time spent at this critically ill patient's bedside actively involved in patient care as well as the coordination of care and discussions with the patient's family. This does not include any procedural time which has been billed separately. Review of Systems:   Review of Systems   All other systems reviewed and are negative.        Vital Signs:   Patient Vitals for the past 4 hrs:   BP Temp Pulse Resp SpO2   22 0800 (!) 150/60 -- 61 11 100 %   09/26/22 0730 (!) 131/51 97.1 °F (36.2 °C) 62 17 100 %   09/26/22 0700 (!) 131/59 -- 61 14 100 %   09/26/22 0600 (!) 120/58 -- 60 18 100 %          Intake/Output Summary (Last 24 hours) at 9/26/2022 0314  Last data filed at 9/26/2022 0800  Gross per 24 hour   Intake 397 ml   Output 1095 ml   Net -698 ml          Physical Examination:    Physical Exam  Constitutional:       Appearance: Normal appearance. HENT:      Head: Normocephalic and atraumatic. Mouth/Throat:      Mouth: Mucous membranes are moist.   Eyes:      Extraocular Movements: Extraocular movements intact. Conjunctiva/sclera: Conjunctivae normal.   Cardiovascular:      Rate and Rhythm: Normal rate and regular rhythm. Pulmonary:      Effort: Pulmonary effort is normal. No respiratory distress. Breath sounds: Normal breath sounds. Abdominal:      General: Abdomen is flat. There is no distension. Palpations: Abdomen is soft. Tenderness: There is no abdominal tenderness. There is no guarding. Musculoskeletal:      Cervical back: Normal range of motion and neck supple. Neurological:      Mental Status: She is alert and oriented to person, place, and time. Labs and Imaging:   Reviewed.       Medications:     Current Facility-Administered Medications   Medication Dose Route Frequency    levETIRAcetam (KEPPRA) tablet 250 mg  250 mg Oral BID    pantoprazole (PROTONIX) 40 mg in 0.9% sodium chloride 10 mL injection  40 mg IntraVENous Q12H    0.9% sodium chloride infusion 250 mL  250 mL IntraVENous PRN    glucose chewable tablet 16 g  4 Tablet Oral PRN    glucagon (GLUCAGEN) injection 1 mg  1 mg IntraMUSCular PRN    dextrose 10% infusion 0-250 mL  0-250 mL IntraVENous PRN    dextrose 10% infusion  20 mL/hr IntraVENous CONTINUOUS    amiodarone (CORDARONE) 375 mg/250 mL D5W infusion  0.5 mg/min IntraVENous CONTINUOUS    sodium chloride (NS) flush 5-40 mL  5-40 mL IntraVENous Q8H    sodium chloride (NS) flush 5-40 mL  5-40 mL IntraVENous PRN    ondansetron (ZOFRAN) injection 4 mg  4 mg IntraVENous Q6H PRN    atorvastatin (LIPITOR) tablet 80 mg  80 mg Oral QHS    levothyroxine (SYNTHROID) tablet 112 mcg  112 mcg Oral ACB     ______________________________________________________________________  EXPECTED LENGTH OF STAY: - - -  ACTUAL LENGTH OF STAY:          MD Bonnie Tate/[facility]  [phone]  [phone]  9/26/2022

## 2022-09-26 NOTE — PROGRESS NOTES
1100 TRANSFER - IN REPORT:    Verbal report received from [facility], [facility] (name) on Leanne Hernandez  being received from CCU (unit) for routine progression of care      Report consisted of patients Situation, Background, Assessment and   Recommendations(SBAR). Information from the following report(s) SBAR, Kardex, Intake/Output, MAR, and Recent Results was reviewed with the receiving nurse. Opportunity for questions and clarification was provided. Assessment completed upon patients arrival to unit and care assumed. Primary Nurse Robyn Talbert RN and Rosalina Holguin RN performed a dual skin assessment on this patient. Impairment noted- see wound doc flow sheet. Manuel score is 15.    1500 End of Shift Note    Bedside shift change report given to Rosalina Holguin RN (oncoming nurse) by Robyn Talbert RN (offgoing nurse). Report included the following information SBAR, Kardex, Intake/Output, MAR, Recent Results, and Cardiac Rhythm NSR    Shift worked:  [id number]     Shift summary and any significant changes:     Pt transferred out of CCU, SLP eval and cleared pt for pureed diet, no plan for GI intervention at this time, next H/H at 1630     Concerns for physician to address:  Advance diet?       Zone phone for oncoming shift:           Activity:  Activity Level: Bed Rest  Number times ambulated in hallways past shift: 0  Number of times OOB to chair past shift: 0    Cardiac:   Cardiac Monitoring: Yes      Cardiac Rhythm: Sinus Rhythm    Access:  Current line(s): PIV     Genitourinary:   Urinary status: sol    Respiratory:   O2 Device: Nasal cannula  Chronic home O2 use?: NO  Incentive spirometer at bedside: YES       GI:  Last Bowel Movement Date: 09/25/22  Current diet:  ADULT DIET Dysphagia - Pureed  Passing flatus: YES  Tolerating current diet: YES       Pain Management:   Patient states pain is manageable on current regimen: YES    Skin:  Manuel Score: 15  Interventions: increase time out of bed, PT/OT consult, and internal/external urinary devices    Patient Safety:  Fall Score:  Total Score: 4  Interventions: bed/chair alarm, assistive device (walker, cane, etc), gripper socks, and pt to call before getting OOB  High Fall Risk: Yes    Length of Stay:  Expected LOS: 2d 21h  Actual LOS: [address], RN

## 2022-09-26 NOTE — PROGRESS NOTES
Bedside shift change report given to Farida BERRY (oncoming nurse) by Tanya Munoz (offgoing nurse). Report included the following information SBAR and Kardex. 0800 - Full assessment complete. Handoff NIH completed, no changed. MRI needs pacemaker card and cardiologist before test can be done. Per GI, OK to advance diet. No bleeding overnight. 0300 - TRANSFER - OUT REPORT:    Verbal report given to [facility], RN (name) on Santi Douglass  being transferred to Neuro Tele (unit) for routine progression of care       Report consisted of patients Situation, Background, Assessment and   Recommendations(SBAR). Information from the following report(s) SBAR and Kardex was reviewed with the receiving nurse. Lines:   Peripheral IV 09/24/22 Left Antecubital (Active)   Site Assessment Clean, dry, & intact 09/26/22 0800   Phlebitis Assessment 0 09/26/22 0800   Infiltration Assessment 0 09/26/22 0800   Dressing Status Clean, dry, & intact 09/26/22 0800   Dressing Type Transparent;Tape 09/26/22 0800   Hub Color/Line Status Blue;Flushed; Infusing 09/26/22 0800   Action Taken Open ports on tubing capped 09/26/22 0800   Alcohol Cap Used Yes 09/26/22 0800        Opportunity for questions and clarification was provided. Patient transported with:   Monitor  Registered Nurse    [address] team for q6h H&H. SLP to assess pt before diet orders. 1020 - PICC team at bedside. Attempting labs and endurance catheter. SLP to work with pt this afternoon. 1140 - Updated patient's daughter over the phone. Daughter will bring pacemaker card to the hospital this afternoon.

## 2022-09-26 NOTE — CONSULTS
Date of Consultation:  September 26, 2022    Reason for Consultation:  Stroke? Chief Complaint   Patient presents with    Stroke       History of Present Illness:   Kelsey Restrepo is a 80 y.o. female with hx of hypertension, diabetes and recent right MCA stroke s/p thrombectomy who is admitted to the hospital with aphasia and left facial droop. History is obtained from chart review as patient does not remember the events that led her to this hospitalization. It appears that patient had been at her baseline state of health when she suddenly developed weakness on the left side as well as trouble with speech. She was brought to the hospital for further evaluation. She did undergo evaluation for possible stroke with a noncontrast head CT that showed no acute hemorrhage and she was given TNKase. She did have a CTA with perfusion that showed no large vessel occlusion. She was admitted to the hospital for further evaluation. It appears that her symptoms resolved shortly after arrival in the emergency room. This morning patient reports that she is doing well. She does not remember the events that led her to this hospitalization however was able to tell me about her stroke in May. Of note it is also reported that patient had been on Eliquis for anticoagulation due to her atrial fibrillation however this was stopped 2 weeks ago due to concerns for possible GI bleed. Interval hx:   No acute events reported overnight. This morning patient reports that she is doing well and has no complaints. She continues to complain of weakness in her left leg    Past Medical History:   Diagnosis Date    Arthritis     Diabetes ([address]     Hard of hearing     Hypertension     Hypothyroidism     Poor historian         Past Surgical History:   Procedure Laterality Date    HX TUBAL LIGATION      IR PERC ART MECH THROMB INFUSION INTRACRANIAL  5/18/2022        No family history on file.      Social History     Tobacco Use    Smoking status: Former    Smokeless tobacco: Not on file   Substance Use Topics    Alcohol use: No        No Known Allergies     Prior to Admission medications    Medication Sig Start Date End Date Taking? Authorizing Provider   levETIRAcetam (Keppra) 100 mg/mL solution Take 250 mg by mouth two (2) times a day. Yes Provider, Historical   timolol (TIMOPTIC) 0.5 % ophthalmic solution Administer 1 Drop to both eyes two (2) times a day. 5/23/22  Yes Mansi Maciel MD   brimonidine-timoloL (COMBIGAN) 0.2-0.5 % drop ophthalmic solution Administer 1 Drop to both eyes two (2) times a day. Yes Provider, Historical   FeroSuL 325 mg (65 mg iron) tablet Take 325 mg by mouth two (2) times daily (with meals). 8/22/22   Provider, Historical   fluticasone propionate (FLONASE) 50 mcg/actuation nasal spray SHAKE LIQUID AND USE 2 SPRAYS IN EACH NOSTRIL DAILY 8/31/22   Provider, Historical   Symbicort 80-4.5 mcg/actuation HFAA Take 2 Puffs by inhalation daily. 9/23/22   Provider, Historical   gabapentin (NEURONTIN) 100 mg capsule Take 100 mg by mouth three (3) times daily. 8/22/22   Provider, Historical   loratadine (CLARITIN) 10 mg tablet Take 10 mg by mouth daily. 6/24/22   Provider, Historical   pantoprazole (PROTONIX) 40 mg tablet  9/14/22   Provider, Historical   potassium chloride (KAON 10%) 20 mEq/15 mL solution TAKE 15 ML BY MOUTH DAILY 8/31/22   Provider, Historical   levothyroxine (SYNTHROID) 125 mcg tablet Take 125 mcg by mouth daily. 8/22/22   Provider, Historical   amLODIPine (NORVASC) 5 mg tablet Take 5 mg by mouth daily. Provider, Historical   aspirin 81 mg chewable tablet Take 81 mg by mouth daily. Provider, Historical   losartan (COZAAR) 25 mg tablet Take 75 mg by mouth daily. Provider, Historical   torsemide (DEMADEX) 20 mg tablet Take 20 mg by mouth daily.     Provider, Historical   acetaminophen (TYLENOL) 325 mg tablet Take 2 Tablets by mouth every four (4) hours as needed for Fever (For temp greater than or equal to 38.5 C or 101.3 F (Unless hepatic failure or contrindicated). Give first line for fever. ). 5/23/22   Roberto Carlos Olguin MD   atorvastatin (LIPITOR) 80 mg tablet Take 1 Tablet by mouth daily. Patient not taking: Reported on 9/24/2022 5/24/22   Roberto Carlos Olguin MD       Review of Systems:    General, constitutional: negative  Eyes, vision: negative  Ears, nose, throat: negative  Cardiovascular, heart: negative  Respiratory: negative  Gastrointestinal: negative  Genitourinary: negative  Musculoskeletal: negative  Skin and integumentary: negative  Psychiatric: negative  Endocrine: negative  Neurological: negative, except for HPI  Hematologic/lymphatic: negative  Allergy/immunology: negative    PHYSICAL EXAMINATION:   Visit Vitals  BP (!) 143/52 (BP 1 Location: Left arm, BP Patient Position: At rest)   Pulse 84   Temp 98 °F (36.7 °C)   Resp 16   SpO2 100%       Physical Exam:  General:  no acute distress  Neck: no carotid bruits  Lungs: clear to auscultation  Heart:  no murmurs, regular rate and rhythm   Lower extremity: no edema    Neurological exam:  Mental Status: Awake, alert, oriented to person, place October stated that  Speech and Language: Mild dysarthria. Able to name, repeat and follow commands   Fund of knowledge was preserved    Cranial nerves: II-XII  Pupils equal and reactive, visual fields intact by confrontation   Extraocular movements intact, no evidence of nystagmus or ptosis   Facial sensation intact   Facial movements symmetric   Hearing intact to soft rub bilaterally   Shoulder shrug symmetric and strong   Tongue protrusion full and midline without fasciculation or atrophy    Motor:   Normal tone and Bulk   Drift: No evidence of pronator drift     Strength testing:   deltoid triceps biceps Wrist ext. Wrist flex. intrinsics   Right 5 5 5 5 5 5   Left 5 5 5 5 5 5     Patient had some difficulty with hearing and following commands when checking strength of the lower extremities. She was able to lift her right lower extremity antigravity with no drift. Her left lower extremity had difficulty with antigravity movements. It also appeared that she had left foot drop. Sensory:  Sensation was intact to light touch per patient. Reflexes:   Biceps Triceps  Brachiorad Patellar Achilles Plantar Hoffmans   Right  2 1 2 1 - Down Neg   Left  2 1 2 1 - Down Neg      Cerebellar testing: Finger-nose-finger was intact    Gait was deferred as patient was in the ICU    LAB DATA REVIEWED:    Lab Results   Component Value Date/Time    Hemoglobin A1c 5.8 (H) 05/18/2022 05:07 AM    Sodium 141 09/26/2022 02:37 AM    Potassium 3.8 09/26/2022 02:37 AM    Chloride 109 (H) 09/26/2022 02:37 AM    Glucose 99 09/26/2022 02:37 AM    BUN 11 09/26/2022 02:37 AM    Creatinine 0.81 09/26/2022 02:37 AM    Calcium 9.0 09/26/2022 02:37 AM    WBC 8.4 09/26/2022 03:35 AM    HCT 24.9 (L) 09/26/2022 03:35 AM    HGB 7.0 (L) 09/26/2022 10:25 AM    PLATELET 450 93/58/8270 03:35 AM       Stroke workup    MRI Brain  Pending    CTA head  Independent review of the CTA head and neck revealed mild atherosclerotic disease in the anterior and posterior circulation. There is no flow-limiting stenosis. TTE:   Pending    Stroke labs:    HgBA1c    Lab Results   Component Value Date/Time    Hemoglobin A1c 5.8 (H) 05/18/2022 05:07 AM       LDL   Lab Results   Component Value Date/Time    LDL, calculated 48.4 09/26/2022 10:25 AM       EKG: A. fib      Assessment and Plan:   Silvina Reid is a 80 y.o. female with hx of hypertension, diabetes and recent right MCA stroke s/p thrombectomy who is admitted to the hospital with aphasia and left facial droop concerning for possible stroke status post TNKase. It appears her neurological symptoms did improve throughout the course the admission.   Differential does include seizure    Left-sided weakness associated with difficulty with speech: Etiology is concerning for possible stroke as she is not currently on anticoagulation   - Recommend maintaining SBP <220/120 for 24 hrs from symptom onset and then BP goal is less than 140/90 (this is the long term goal)   -Given that patient does have a drop in hemoglobin, would hold off on aspirin 81 mg for now. - Risk factor modification: LDL 48 and hemoglobin A1c is 5.8%   - if LDL > 70, would start atorvastatin 40mg daily   - Would obtain MRI brain without contrast to rule out acute ischemia   - please obtain TTE to rule out intracardiac thrombus   - would monitor on telemetry to rule out arrhythmias    - please obtain PT/OT and speech consultations    Atrial fibrillation: Off anticoagulation due to drop in hemoglobin which is concerning for possible GI bleed.  -We will need to consider restarting anticoagulation and antiplatelet after evaluation for her drop in hemoglobin    We discussed extensively the importance of lifestyle modification including smoking cessation, diet, and incorporating exercise into daily routine. Thank you for the consult, we will continue to follow. Please call with any additional questions. Caitlin Shrestha MD     30 minutes was spent providing medical care of this critically ill patient reviewing records, obtaining additional history from family, examining patient , discussing with collaborating physicians and nursing, and discussing treatment plans.

## 2022-09-26 NOTE — PROGRESS NOTES
End of Shift Note    Bedside shift change report given to Massie Essex, RN (oncoming nurse) by Wily Lundberg RN (offgoing nurse). Report included the following information SBAR, Kardex, Intake/Output, MAR, Recent Results, and Cardiac Rhythm NSR    Shift worked:  [id number]     Shift summary and any significant changes:     -Q6 H&H NEXT DUE AT 2300   -LAST HGB 8.0  -LAST BM 9/26/22 TARWALTER LOOKING; MD AWARE     Concerns for physician to address: NONE     Zone phone for oncoming shift:           Activity:  Activity Level: Chair  Number times ambulated in hallways past shift: 0  Number of times OOB to chair past shift: 0    Cardiac:   Cardiac Monitoring: Yes      Cardiac Rhythm: AV Paced    Access:  Current line(s): PIV     Genitourinary:   Urinary status: sol    Respiratory:   O2 Device: Nasal cannula  Chronic home O2 use?: NO  Incentive spirometer at bedside: YES       GI:  Last Bowel Movement Date: 09/25/22  Current diet:  ADULT DIET Dysphagia - Pureed  Passing flatus: YES  Tolerating current diet: YES       Pain Management:   Patient states pain is manageable on current regimen: YES    Skin:  Manuel Score: 16  Interventions: increase time out of bed, PT/OT consult, and internal/external urinary devices    Patient Safety:  Fall Score:  Total Score: 4  Interventions: bed/chair alarm, assistive device (walker, cane, etc), gripper socks, and pt to call before getting OOB  High Fall Risk: Yes    Length of Stay:  Expected LOS: 2d 21h  Actual LOS: [address], RN

## 2022-09-26 NOTE — INTERDISCIPLINARY ROUNDS
Interdisciplinary team rounds were held 9/26/2022 with the following team members:Care Management, Diabetes Treatment Specialist, Nursing, Nutrition, Pharmacy, and Physician. Plan of care discussed. See clinical pathway and/or care plan for interventions and desired outcomes. Goals of the Day:  Transfer out of unit soon.

## 2022-09-26 NOTE — PROGRESS NOTES
Speech Pathology Note    Chart reviewed and spoke with RN. PICC team currently at bedside and patient about to transfer to Neuro, per RN. Will defer SLP evaluation and follow up when patient is available. Thank you.     Ash Castillo M.S., CCC-SLP

## 2022-09-26 NOTE — PROGRESS NOTES
Endurance Catheter insertion note     Inserted 20 G, 8 cm long  Endurance Extended Dwell Peripheral Catheter into right upper arm using ultrasound guidance and sterile technique. Positive blood return. Patient tolerated procedure well. Denies questions or concerns at this time. The Endurance catheter is an extended dwell peripheral catheter and may remain in place 29 days. Blood samples can be obtained from this catheter. To obtain labs, a tourniquet may be used, flush with 10ml NS, waste 3ml, draw labs, flush with 20ml NS. Dressings needs to be changed with central line dressing kit using bio patch and stat lock every 7 days by nurse. Primary nurse, Laura Watkins RN notified.           Zuleyka Ly RN BSN, Vascular Access Team. PICC Nurse

## 2022-09-26 NOTE — PROGRESS NOTES
Problem: Dysphagia (Adult)  Goal: *Acute Goals and Plan of Care (Insert Text)  Description: Speech Pathology Goals  Initiated 9/26/2022    1. Patient will tolerate Puree/Thin Liquids without signs of aspiration or adverse effects within 7 days. Outcome: Progressing Towards Goal     SPEECH LANGUAGE PATHOLOGY BEDSIDE SWALLOW EVALUATION  Patient: Diana Walker (15 y.o. female)  Date: 9/26/2022  Primary Diagnosis: Ischemic stroke [facility]) [I63.9]       Precautions:        ASSESSMENT :  Based on the objective data described below, the patient presents with moderate oral dysphagia and a grossly functional pharyngeal phase. Patient observed to be edentulous, but patient reports she does not typically wear her dentures at baseline. Patient observed with incomplete and significantly prolonged mastication of solids, leading to suspected significantly delayed propulsion. Patient tolerated thin liquids via straw and cup without difficulty or signs of aspiration. Note CT showed \"No evidence of acute infarct or intracranial hemorrhage. No significant change. \" MRI pending. Recommend Puree/Thin Liquids with aspiration precautions outlined below. RN educated re: SLP evaluation. Patient will benefit from skilled intervention to address the above impairments. Patients rehabilitation potential is considered to be fair. PLAN :  Recommendations and Planned Interventions:  -- Puree/Thin Liquids  -- Medication as Tolerated  -- 1:1 Assistance with PO, as needed  -- Small Bites/Sips  -- Sitting Upright for all PO    Frequency/Duration: Patient will be followed by speech-language pathology 3 times a week to address goals. Discharge Recommendations: To Be Determined     SUBJECTIVE:   Patient stated Zula Bridges when asked her name. Patient eventually self-corrected. Patient is hard of hearing.     OBJECTIVE:     Past Medical History:   Diagnosis Date    Arthritis     Diabetes ([facility] [address]     Hard of hearing     Hypertension Hypothyroidism     Poor historian      Past Surgical History:   Procedure Laterality Date    HX TUBAL LIGATION      IR PERC ART MECH THROMB INFUSION INTRACRANIAL  5/18/2022     Prior Level of Function/Home Situation:   Home Situation  Home Environment: Private residence  One/Two Story Residence: One story  Living Alone: No  Support Systems: Child(piotr), Other Family Member(s) (daughter and granddaughter)  Patient Expects to be Discharged to[de-identified] Home  Current DME Used/Available at Home: Wheelchair, Walker, rolling, Raised toilet seat, Grab bars, Oxygen, portable    Diet prior to admission: On Puree/Thin Liquids, during prior admission  Current Diet:  NPO     Cognitive and Communication Status:  Neurologic State: Alert  Orientation Level: Oriented to person, Oriented to place, Disoriented to situation, Disoriented to time  Cognition: Follows commands    Perseveration: No perseveration noted  Safety/Judgement: Awareness of environment, Decreased insight into deficits    Oral Assessment:  Oral Assessment  Labial: No impairment  Dentition: Edentulous  Oral Hygiene: Mild white lingual coating  Lingual: No impairment  Velum: Unable to visualize  Mandible: No impairment    P.O. Trials:  Patient Position: Upright in bed  Vocal quality prior to P.O.: No impairment  Consistency Presented: Ice chips; Thin liquid;Puree; Solid  How Presented: Self-fed/presented;Straw;Successive swallows;SLP-fed/presented;Spoon     Bolus Acceptance: No impairment  Bolus Formation/Control: Impaired  Type of Impairment: Delayed; Incomplete;Mastication  Propulsion: Delayed (# of seconds) (With solid trial)  Oral Residue: None        Aspiration Signs/Symptoms: None  Pharyngeal Phase Characteristics: No impairment, issues, or problems                    NOMS:   The NOMS functional outcome measure was used to quantify this patient's level of swallowing impairment. Based on the NOMS, the patient was determined to be at level 4 for swallow function.      NOMS Swallowing Levels:  Level 1 (CN): NPO  Level 2 (CM): NPO but takes consistency in therapy  Level 3 (CL): Takes less than 50% of nutrition p.o. and continues with nonoral feedings; and/or safe with mod cues; and/or max diet restriction  Level 4 (CK): Safe swallow but needs mod cues; and/or mod diet restriction; and/or still requires some nonoral feeding/supplements  Level 5 (CJ): Safe swallow with min diet restriction; and/or needs min cues  Level 6 (CI): Independent with p.o.; rare cues; usually self cues; may need to avoid some foods or needs extra time  Level 7 ([address] [place] [address]): Independent for all p.o.  MARK. (2003). National Outcomes Measurement System (NOMS): Adult Speech-Language Pathology User's Guide. Pain:  Pain Scale 1: Numeric (0 - 10)  Pain Intensity 1: 0       After treatment:   Patient left in no apparent distress in bed, Call bell within reach, and Nursing notified    COMMUNICATION/EDUCATION:   Patient was educated regarding role of SLP and SLP POC. She demonstrated good understanding as evidenced by verbalizing understanding. The patient's plan of care including recommendations, planned interventions, and recommended diet changes were discussed with: Registered nurse. Patient/family have participated as able in goal setting and plan of care. Patient/family agree to work toward stated goals and plan of care.     Thank you for this referral.  BRANDY Sarkar  Time Calculation: 15 mins

## 2022-09-26 NOTE — PROGRESS NOTES
Gastroenterology Daily Progress Note   Meryl Mcgrath [place] for Dr. Mary Jane Ridley)   [facility]    Admit Date: 9/24/2022     Follow up of anemia    Subjective:       No melena/maroon stools overnight, last episode was yesterday afternoon  Never got blood transfusion  Hgb 7.8 this am     Latest Reference Range & Units 9/24/22 14:12 9/25/22 10:00 9/25/22 20:01 9/26/22 02:37 9/26/22 03:35   HGB 11.5 - 16.0 g/dL 8.4 (L) 6.9 (L) 8.0 (L) 6.5 (L) 7.8 (L)   (L): Data is abnormally low    The hgb >7 were peripheral sticks and the hgb <7 were drawn from a line and felt to be diluted, falsely low by nursing    Current Facility-Administered Medications   Medication Dose Route Frequency    levETIRAcetam (KEPPRA) tablet 250 mg  250 mg Oral BID    pantoprazole (PROTONIX) 40 mg in 0.9% sodium chloride 10 mL injection  40 mg IntraVENous Q12H    0.9% sodium chloride infusion 250 mL  250 mL IntraVENous PRN    glucose chewable tablet 16 g  4 Tablet Oral PRN    glucagon (GLUCAGEN) injection 1 mg  1 mg IntraMUSCular PRN    dextrose 10% infusion 0-250 mL  0-250 mL IntraVENous PRN    dextrose 10% infusion  20 mL/hr IntraVENous CONTINUOUS    amiodarone (CORDARONE) 375 mg/250 mL D5W infusion  0.5 mg/min IntraVENous CONTINUOUS    sodium chloride (NS) flush 5-40 mL  5-40 mL IntraVENous Q8H    sodium chloride (NS) flush 5-40 mL  5-40 mL IntraVENous PRN    ondansetron (ZOFRAN) injection 4 mg  4 mg IntraVENous Q6H PRN    atorvastatin (LIPITOR) tablet 80 mg  80 mg Oral QHS    levothyroxine (SYNTHROID) tablet 112 mcg  112 mcg Oral ACB        Objective:     Visit Vitals  BP (!) 131/59   Pulse 61   Temp 97.7 °F (36.5 °C)   Resp 14   SpO2 100%   Blood pressure (!) 131/59, pulse 61, temperature 97.7 °F (36.5 °C), resp. rate 14, SpO2 100 %. No intake/output data recorded. 09/24 1901 - 09/26 0700  In: 397 [P.O.:100;  I.V.:297]  Out: 1445 [Urine:1445]      Intake/Output Summary (Last 24 hours) at 9/26/2022 0734  Last data filed at 9/26/2022 0636  Gross per 24 hour   Intake 397 ml   Output 1170 ml   Net -773 ml         Physical Exam:       General: BF nad  Chest:  CTA, No rhonchi, rales or rubs. Nasal cannula  Heart: S1, S2, RRR  GI: Soft, NT, ND + bowel sounds  Extremities: no edema  CNS: A&O x 3      Labs:       Recent Results (from the past 24 hour(s))   CBC W/O DIFF    Collection Time: 09/25/22 10:00 AM   Result Value Ref Range    WBC 6.4 3.6 - 11.0 K/uL    RBC 2.38 (L) 3.80 - 5.20 M/uL    HGB 6.9 (L) 11.5 - 16.0 g/dL    HCT 22.3 (L) 35.0 - 47.0 %    MCV 93.7 80.0 - 99.0 FL    MCH 29.0 26.0 - 34.0 PG    MCHC 30.9 30.0 - 36.5 g/dL    RDW 14.4 11.5 - 14.5 %    PLATELET 406 478 - 612 K/uL    MPV 10.8 8.9 - 12.9 FL    NRBC 0.0 0  WBC    ABSOLUTE NRBC 0.00 0.00 - 7.96 K/uL   METABOLIC PANEL, BASIC    Collection Time: 09/25/22 10:00 AM   Result Value Ref Range    Sodium 145 136 - 145 mmol/L    Potassium 3.8 3.5 - 5.1 mmol/L    Chloride 110 (H) 97 - 108 mmol/L    CO2 32 21 - 32 mmol/L    Anion gap 3 (L) 5 - 15 mmol/L    Glucose 61 (L) 65 - 100 mg/dL    BUN 13 6 - 20 MG/DL    Creatinine 0.81 0.55 - 1.02 MG/DL    BUN/Creatinine ratio 16 12 - 20      GFR est AA >60 >60 ml/min/1.73m2    GFR est non-AA >60 >60 ml/min/1.73m2    Calcium 9.4 8.5 - 10.1 MG/DL   OCCULT BLOOD, STOOL    Collection Time: 09/25/22 11:22 AM   Result Value Ref Range    Occult blood, stool Positive (A) NEG     GLUCOSE, POC    Collection Time: 09/25/22  1:06 PM   Result Value Ref Range    Glucose (POC) 50 (LL) 65 - 117 mg/dL    Performed by Shayne Rabago RN    RBC, ALLOCATE    Collection Time: 09/25/22  1:15 PM   Result Value Ref Range    HISTORY CHECKED?  Historical check performed    GLUCOSE, POC    Collection Time: 09/25/22  2:03 PM   Result Value Ref Range    Glucose (POC) 63 (L) 65 - 117 mg/dL    Performed by Shayne Rabago RN    GLUCOSE, POC    Collection Time: 09/25/22  2:04 PM   Result Value Ref Range    Glucose (POC) 84 65 - 117 mg/dL    Performed by Herman Rodriguez RN    GLUCOSE, POC    Collection Time: 09/25/22  2:08 PM   Result Value Ref Range    Glucose (POC) 62 (L) 65 - 117 mg/dL    Performed by Herman Rodriguez RN    GLUCOSE, POC    Collection Time: 09/25/22  3:04 PM   Result Value Ref Range    Glucose (POC) 139 (H) 65 - 117 mg/dL    Performed by Herman Rodriguez RN    TYPE & SCREEN    Collection Time: 09/25/22  4:21 PM   Result Value Ref Range    Crossmatch Expiration 09/28/2022,2359     ABO/Rh(D) B POSITIVE     Antibody screen NEG     Unit number [id number]     Blood component type RC LR     Unit division 00     Status of unit ALLOCATED     Crossmatch result Compatible    HEMOGLOBIN    Collection Time: 09/25/22  8:01 PM   Result Value Ref Range    HGB 8.0 (L) 11.5 - 16.0 g/dL   GLUCOSE, POC    Collection Time: 09/25/22  8:16 PM   Result Value Ref Range    Glucose (POC) 128 (H) 65 - 117 mg/dL    Performed by Layla Quinn RN    GLUCOSE, POC    Collection Time: 09/25/22 11:36 PM   Result Value Ref Range    Glucose (POC) 106 65 - 117 mg/dL    Performed by Layla Quinn RN    METABOLIC PANEL, COMPREHENSIVE    Collection Time: 09/26/22  2:37 AM   Result Value Ref Range    Sodium 141 136 - 145 mmol/L    Potassium 3.8 3.5 - 5.1 mmol/L    Chloride 109 (H) 97 - 108 mmol/L    CO2 31 21 - 32 mmol/L    Anion gap 1 (L) 5 - 15 mmol/L    Glucose 99 65 - 100 mg/dL    BUN 11 6 - 20 MG/DL    Creatinine 0.81 0.55 - 1.02 MG/DL    BUN/Creatinine ratio 14 12 - 20      GFR est AA >60 >60 ml/min/1.73m2    GFR est non-AA >60 >60 ml/min/1.73m2    Calcium 9.0 8.5 - 10.1 MG/DL    Bilirubin, total 1.3 (H) 0.2 - 1.0 MG/DL    ALT (SGPT) 34 12 - 78 U/L    AST (SGOT) 40 (H) 15 - 37 U/L    Alk.  phosphatase 84 45 - 117 U/L    Protein, total 5.7 (L) 6.4 - 8.2 g/dL    Albumin 2.2 (L) 3.5 - 5.0 g/dL    Globulin 3.5 2.0 - 4.0 g/dL    A-G Ratio 0.6 (L) 1.1 - 2.2     CBC W/O DIFF    Collection Time: 09/26/22  2:37 AM   Result Value Ref Range    WBC 8.0 3.6 - 11.0 K/uL    RBC 2.22 (L) 3.80 - 5.20 M/uL    HGB 6.5 (L) 11.5 - 16.0 g/dL    HCT 20.7 (L) 35.0 - 47.0 %    MCV 93.2 80.0 - 99.0 FL    MCH 29.3 26.0 - 34.0 PG    MCHC 31.4 30.0 - 36.5 g/dL    RDW 14.2 11.5 - 14.5 %    PLATELET 039 787 - 507 K/uL    MPV 11.0 8.9 - 12.9 FL    NRBC 0.0 0  WBC    ABSOLUTE NRBC 0.00 0.00 - 0.01 K/uL   MAGNESIUM    Collection Time: 09/26/22  2:37 AM   Result Value Ref Range    Magnesium 2.0 1.6 - 2.4 mg/dL   PHOSPHORUS    Collection Time: 09/26/22  2:37 AM   Result Value Ref Range    Phosphorus 2.4 (L) 2.6 - 4.7 MG/DL   CBC W/O DIFF    Collection Time: 09/26/22  3:35 AM   Result Value Ref Range    WBC 8.4 3.6 - 11.0 K/uL    RBC 2.66 (L) 3.80 - 5.20 M/uL    HGB 7.8 (L) 11.5 - 16.0 g/dL    HCT 24.9 (L) 35.0 - 47.0 %    MCV 93.6 80.0 - 99.0 FL    MCH 29.3 26.0 - 34.0 PG    MCHC 31.3 30.0 - 36.5 g/dL    RDW 14.0 11.5 - 14.5 %    PLATELET 714 155 - 554 K/uL    MPV 10.4 8.9 - 12.9 FL    NRBC 0.0 0  WBC    ABSOLUTE NRBC 0.00 0.00 - 0.01 K/uL   GLUCOSE, POC    Collection Time: 09/26/22  6:29 AM   Result Value Ref Range    Glucose (POC) 103 65 - 117 mg/dL    Performed by Odilia Duran RN    LABRCNT(wbc:2,hgb:2,hct:2,plt:2,)  Recent Labs     09/26/22  0237 09/25/22  1000 09/24/22  1412    145 144   K 3.8 3.8 3.8   * 110* 108   CO2 31 32 34*   BUN 11 13 11   CREA 0.81 0.81 0.91   GLU 99 61* 89   CA 9.0 9.4 9.7   MG 2.0  --   --    PHOS 2.4*  --   --    LABRCNT(sgot:3,gpt:3,ap:3,tbiL:3,TP:3,ALB:3,GLOB:3,ggt:3,aml:3,amyp:3,lpse:3,hlpse:3)  Recent Labs     09/24/22  1412   INR 1.1   PTP 11.4*     Recent Labs     09/26/22  0237 09/24/22  1412   AP 84 94   TP 5.7* 6.5   ALB 2.2* 2.5*   GLOB 3.5 4.0   BRIEFLAB(B12,FOL,FOLAT,RBCF)No results found for: FOL, RBCFLABRCNT(CPK:3,CpKMB:3,ckndx:3,troiq:3)No components found for: GLPOCBRIEFLAB(CHOL,CHOLX,CHOLP,CHLST,CHOLV,HDL,HDLC,HDLP,LDL,DLDL,LDLC,DLDLP,TGL,TGLX,TRIGL,TRIGP,CHHD,CHHDX)No results for input(s): PH, PCO2, PO2 in the last 72 hours.LABRCNT(CPK:3,CpKMB:3,ckndx:3,troiq:3)QUOC Rivera  No results for input(s): CPK, CKNDX, TROIQ in the last 72 hours. 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 No plans for repeat EGD at this time. Advance diet per primary team.  Continue empiric ppi. Recommend close monitoring and if recurrent transfusion dependent blood loss, will need endoscopic evaluation. However I suspect as the TNKase is metabolized any oozing throughout the GI tract will cease. She is a very high risk patient for a low-risk procedure and family agrees with conservative monitoring. Will follow. QUOC Mix    9/26/2022 [address] [facility], [address] [place] [address] [place]: [phone]        She has a hx of ischemic CVA and was given TNKAse on 9.24. She has had no more bleeding since yesterday. Hgb is stable. Agree with close observation and following hgb and/or for recurring bleeding. We will follow with you. Pertinent labs, provider notes and radiological testing was reviewed by me  I discussed the case with the Advance Practice Provider. I have personally reviewed the history and independently examined the patient. I have reviewed the chart and agree with the documentation recorded by the Mid Level Provider, including the assessment, treatment plan, and disposition. Jose Orozco MD

## 2022-09-26 NOTE — PROGRESS NOTES
Problem: Self Care Deficits Care Plan (Adult)  Goal: *Acute Goals and Plan of Care (Insert Text)  Description: FUNCTIONAL STATUS PRIOR TO ADMISSION: pt reports that she lives with her daughter and granddaughter who care for her. They assist her with bathing/self care. Family was not present to provide details re: PLOF. HOME SUPPORT: The patient lived with daughter and granddaughter who assist her. .    Occupational Therapy Goals  Initiated 9/25/2022   1. Patient will perform self-feeding with supervision/set-up within 7 day(s). 2.  Patient will perform grooming with supervision/set-up within 7 day(s). 3.  Patient will perform upper body bathing with moderate assistance  within 7 day(s). 4.  Patient will perform supine to sit EOB in preparation for adls with maximal assistance within 7 day(s). 5.  Patient will sit EOB with supervision in preparation for self care within 7 days. 6.  Patient will participate in upper extremity therapeutic exercise/activities with supervision/set-up within 7 day(s). 7.  Patient will perform toilet transfer, using best equipment with maximal assistance within 7 days. 8.  Patient will participate in performing [id number] test of UE function, within 7 days. Outcome: Progressing Towards Goal   OCCUPATIONAL THERAPY TREATMENT  Patient: Chioma Lowry (46 y.o. female)  Date: 9/26/2022  Diagnosis: Ischemic stroke ([facility] [address] [I63.9] <principal problem not specified>      Precautions:  fall  Chart, occupational therapy assessment, plan of care, and goals were reviewed. ASSESSMENT  Patient continues with skilled OT services and is progressing towards goals. Pt's family was present this session and able to provide PLOF information. Pt receives assistance at home for bathing and dressing; she has assistance-stand by- when she uses her RW for short distance. They use a gait belt as pt tends to suddenly collapse/weaken without warning. Pt also uses a w/c at home.   Daughter feels that she would like pt to return home at discharge (not rehab) as pt has been to rehab several times and has been having [facility] therapy. Daughter states that she has plenty of assistance at home to care for pt's needs. Today's session, pt demonstrates improved bed mobility (today min A for supine to sit--max A for sit to supine) and needed moderate assist X2 for sit to and from standing and taking a few short side steps (poor quality)  up to [facility]. She has difficulty mobilizing her LLE and was supposed to have a brace (afo) for her LLE, that she still has not received. Of concern,  is pt's poor tolerance for activity at this time. Continue to work towards toilet transfers and improving standing tolerance for adls. Current Level of Function Impacting Discharge (ADLs): generally dependent for adls/toileting. Min to max A for bed mobility, sit to and from standing min/mod Assist, taking minimal side steps up to [facility] min/mod A using RW. Poor tolerance for adls/mobility. Other factors to consider for discharge: family would like pt to discharge home. PLAN :  Patient continues to benefit from skilled intervention to address the above impairments. Continue treatment per established plan of care to address goals. Recommend with staff: encourage upright, and may start with kevon lift for safety due to pt's poor activity tolerance. Recommend next OT session: toilet transfer/BSC with assist X2 depending on fatigue/tolerance    Recommendation for discharge: (in order for the patient to meet his/her long term goals)  To be determined: initially recommending snf rehab, however, pt's daughter would like pt to discharge home with assistance that she and her family can provide. She reports that pt is well set up with e Milly Blade at home.       This discharge recommendation:  Has not yet been discussed the attending provider and/or case management    IF patient discharges home will need the following DME: tbd; hospital bed, kevon lift, depending on pt's progress       SUBJECTIVE:   Patient stated I'm tired.     OBJECTIVE DATA SUMMARY:   Cognitive/Behavioral Status:  Neurologic State: Alert  Orientation Level: Oriented to person  Cognition: Follows commands;Decreased attention/concentration; Impaired decision making  Perception: Appears intact  Perseveration: No perseveration noted;Verbal cues provided  Safety/Judgement: Decreased awareness of environment;Decreased awareness of need for assistance;Decreased awareness of need for safety; Fall prevention    Functional Mobility and Transfers for ADLs:  Bed Mobility:  Rolling: Moderate assistance (for placing LUE to assist in rolling)  Supine to Sit: Minimum assistance; Additional time; Moderate assistance  Sit to Supine: Maximum assistance  Scooting: Moderate assistance    Transfers:  Sit to Stand: Minimum assistance; Moderate assistance (using RW and gait belt while standing at EOB)  Functional Transfers  Bathroom Mobility:  (endurance is poor--safety risk for transfer) would likely need kevon lift pad placed for safety due to poor functional tolerance  Bed to Chair:  (unable due to poor endurance)    Balance:  Sitting: Impaired (but improved since last tx session)  Sitting - Static: Good (unsupported) (seated EOB)  Sitting - Dynamic:  (not tested; pt declined attempt)  Standing: Impaired  Standing - Static: Fair;Constant support (flexed forward posture due to fatigue, unable to correct despite cues -- manual and verbal)  Standing - Dynamic : Poor;Constant support (needs constant assistance difficulty with managing LLE-family states was to get a brace for LLE-was ordered but was incorrect (for RLE))    ADL Intervention:            Upper Body Bathing  Bathing Assistance:  (pt has assistance from family at home at baseline.)        Lower Body Bathing  Bathing Assistance:  (pt has assistance from family at home at baseline)    Upper Body Dressing Assistance  Dressing Assistance:  (pt has assistance from family at home at baseline)    [facility]:  (pt has assistance from family a t home)  Socks: Total assistance (dependent)  Leg Crossed Method Used: No  Position Performed: Seated edge of bed    Toileting  Bowel Hygiene: Total assistance (dependent) (incontinent)    Cognitive Retraining  Safety/Judgement: Decreased awareness of environment;Decreased awareness of need for assistance;Decreased awareness of need for safety; Fall prevention    Therapeutic Exercises:   Sat EOB performed 3 reps unilateral AROM/AAROM Shoulder flex/ext. Decreased LUE AROM (likely baseline from old cva)  BUE shoulder flex/ext with clasped hands--AAROM to increase ROM-5 reps  Seated BLE knee flex/ext--5 reps, needs intermittent CGA LLE  Ankle pumps intact RLE, unable LLE     Pain:  No c/o pain; c/o fatigue    Activity Tolerance:   Poor and requires frequent rest breaks    After treatment patient left in no apparent distress:   Supine in bed, Call bell within reach, Caregiver / family present, Side rails x 3, and nursing informed     COMMUNICATION/COLLABORATION:   The patients plan of care was discussed with: Registered nurse.      HERNÁN Galeana/L  Time Calculation: 38 mins

## 2022-09-26 NOTE — PROGRESS NOTES
1900: Bedside shift change report given to Liza Palacio (oncoming nurse) by Arpan (offgoing nurse). Report included the following information SBAR, Kardex, Intake/Output, MAR, Recent Results, Cardiac Rhythm NSR, and Dual Neuro Assessment. 2000: Shift assessment and NIH scale complete at this time. NIH is 3. Patient alert and oriented to self and location, with periodic confusion to situation and time. Right  > left in upper extremities. Left leg drifts but does not touch bed. See Flowsheets and MAR for more details. 2025: Hgb is 8.0. CCU NP notified. Per CCU NP, do not give ordered blood at this time. 0000: Reassessment complete. No change to previous assessment. See Flowsheets and MAR for more details. 0400: Reassessment complete. No change to previous assessment. See Flowsheets and MAR for more details. 0451: Patient refused second K phos tablet. Patient educated about medication, but is still refusing to take at this time. Will attempt again at a later time. 4662: Patient continues to refuse second K phos tablet. CCU NP aware. No new orders received at this time. 0700: Bedside shift change report given to Craig Girard (oncoming nurse) by Liza Palacio (offgoing nurse). Report included the following information SBAR, Kardex, Intake/Output, MAR, Recent Results, Cardiac Rhythm NSR, and Dual Neuro Assessment.

## 2022-09-26 NOTE — PROGRESS NOTES
Hospitalist Progress Note    NAME: Teo Duarte   :  1928   MRN:  [id number]       Assessment / Plan:  Acute ischemic stroke s/p TNK    - Serial neuro exams.  - neurology following  - Obtain CT head 24 hours after tPA.did not reveal any bleed  -- Neurology to decide when to resume ASA  - Obtain MRI without contrast.  - ECHO showed   Left Ventricle: Normal left ventricular systolic function with a visually estimated EF of 70 - 75%. Left ventricle size is normal. Increased wall thickness. Normal wall motion. Abnormal diastolic function. Aortic Valve: Valve structure is normal. Moderate sclerosis of the aortic valve cusp. No stenosis. Tricuspid Valve: Moderate regurgitation. Pulmonary Arteries: Severe pulmonary hypertension present. The estimated PASP is 86 mmHg. - If there is any acute change in neurological status then obtain stat CT head. - Speech therapy consult for swallowing evaluation.  - PT/OT recommending SNF  -   - c/w high intensity statins.  - HbA1c. 5.8  - Monitor blood pressure  --Currently on Keppra, continue for now       GI bleed:  -Hb 7 today. Down from 7.8  -Treanfsue for hgb < 7  -Hold off on AC, GI following     Hypothyroidism:  -Continue levothyroxine. HTN:  -Holding home BP meds for now. Estimated discharge date:   Barriers: Anemia stable, Neuro clearance, dispo. Code status: Full  Prophylaxis: SCD's  Recommended Disposition: SNF/LTC     Subjective:     Chief Complaint / Reason for Physician Visit  Patient seen and examined at the bedside. She says was feeling weak but now has no complaints. Denies any focal neurological deficits. Discussed with RN events overnight.      Review of Systems:  Symptom Y/N Comments  Symptom Y/N Comments   Fever/Chills    Chest Pain     Poor Appetite    Edema     Cough    Abdominal Pain     Sputum    Joint Pain     SOB/HAWKINS    Pruritis/Rash     Nausea/vomit    Tolerating PT/OT     Diarrhea    Tolerating Diet Constipation    Other       Could NOT obtain due to:      Objective:     VITALS:   Last 24hrs VS reviewed since prior progress note.  Most recent are:  Patient Vitals for the past 24 hrs:   Temp Pulse Resp BP SpO2   09/26/22 1200 -- 84 -- -- --   09/26/22 1100 98 °F (36.7 °C) 72 16 (!) 143/52 100 %   09/26/22 0800 -- 61 11 (!) 150/60 100 %   09/26/22 0730 97.1 °F (36.2 °C) 62 17 (!) 131/51 100 %   09/26/22 0700 -- 61 14 (!) 131/59 100 %   09/26/22 0600 -- 60 18 (!) 120/58 100 %   09/26/22 0500 -- 79 13 (!) 98/52 100 %   09/26/22 0400 97.7 °F (36.5 °C) 62 15 107/63 100 %   09/26/22 0231 -- 78 19 (!) 137/58 98 %   09/26/22 0200 -- 62 13 (!) 95/44 100 %   09/26/22 0100 -- 60 13 (!) 100/43 100 %   09/26/22 0000 98.1 °F (36.7 °C) 60 17 (!) 101/50 100 %   09/25/22 2300 -- 61 15 108/64 100 %   09/25/22 2200 -- 76 17 (!) 127/45 100 %   09/25/22 2100 -- 68 18 (!) 159/56 100 %   09/25/22 2000 98.1 °F (36.7 °C) 72 23 (!) 167/57 98 %   09/25/22 1800 -- 61 15 (!) 174/77 100 %   09/25/22 1700 -- 62 17 (!) 165/141 92 %   09/25/22 1600 97.9 °F (36.6 °C) 60 18 (!) 163/70 95 %   09/25/22 1559 -- 73 18 -- --   09/25/22 1558 -- 64 17 -- --   09/25/22 1557 -- 62 17 -- --   09/25/22 1556 -- 63 15 -- --   09/25/22 1555 -- 65 17 -- --   09/25/22 1554 -- 78 19 -- --   09/25/22 1553 -- 73 17 -- 98 %   09/25/22 1552 -- 69 16 -- 95 %   09/25/22 1551 -- 68 18 -- --   09/25/22 1550 -- 68 20 -- --   09/25/22 1549 -- 75 19 -- --   09/25/22 1548 -- 74 19 -- --   09/25/22 1547 -- 62 19 -- --   09/25/22 1546 -- 63 18 -- --   09/25/22 1545 -- 62 18 -- --   09/25/22 1544 -- 66 16 -- --   09/25/22 1543 -- 60 18 -- --   09/25/22 1542 -- 63 18 -- --   09/25/22 1541 -- 69 18 -- --   09/25/22 1540 -- 70 18 -- --   09/25/22 1539 -- 73 17 -- --   09/25/22 1538 -- 71 17 -- --   09/25/22 1537 -- 65 18 -- --   09/25/22 1536 -- 68 16 -- --   09/25/22 1535 -- 79 19 -- --   09/25/22 1534 -- 77 19 -- 98 %   09/25/22 1533 -- 60 19 -- 100 %   09/25/22 1532 -- 76 18 -- --   09/25/22 1531 -- 62 19 -- --   09/25/22 1530 -- 61 19 -- --   09/25/22 1529 -- 65 18 -- --   09/25/22 1528 -- 72 18 -- --   09/25/22 1527 -- 63 17 -- --   09/25/22 1526 -- 75 16 -- --   09/25/22 1525 -- 73 17 -- --   09/25/22 1524 -- 75 17 -- --   09/25/22 1523 -- 81 17 -- --   09/25/22 1522 -- 70 12 -- --   09/25/22 1521 -- 68 15 -- --   09/25/22 1520 -- 75 16 -- --   09/25/22 1519 -- 63 16 -- --   09/25/22 1518 -- 72 19 -- --   09/25/22 1517 -- 66 15 -- --   09/25/22 1516 -- 62 16 -- --   09/25/22 1515 -- 62 16 -- --   09/25/22 1514 -- 68 13 -- --   09/25/22 1513 -- 64 15 -- --   09/25/22 1512 -- 64 16 -- --   09/25/22 1511 -- 62 14 -- --   09/25/22 1510 -- 63 17 -- --   09/25/22 1509 -- 60 18 -- --   09/25/22 1508 -- 78 17 -- --   09/25/22 1507 -- 75 18 -- --   09/25/22 1506 -- 75 17 -- --   09/25/22 1505 -- 64 18 -- --   09/25/22 1504 -- 65 20 -- --   09/25/22 1503 -- 65 19 -- --   09/25/22 1502 -- 76 18 -- --   09/25/22 1501 -- 73 18 -- --   09/25/22 1500 -- 76 18 -- --   09/25/22 1400 -- 65 20 (!) 156/55 93 %       Intake/Output Summary (Last 24 hours) at 9/26/2022 1340  Last data filed at 9/26/2022 1100  Gross per 24 hour   Intake 497 ml   Output 780 ml   Net -283 ml        I had a face to face encounter and independently examined this patient on 9/26/2022, as outlined below:  PHYSICAL EXAM:  General: WD, WN. Alert, cooperative, no acute distress    EENT:  EOMI. Anicteric sclerae. MMM  Resp:  CTA bilaterally, no wheezing or rales. No accessory muscle use  CV:  Regular  rhythm,  No edema  GI:  Soft, Non distended, Non tender. +Bowel sounds  Neurologic:  Alert and oriented X 3, normal speech,   Psych:   Good insight. Not anxious nor agitated  Skin:  No rashes.   No jaundice    Reviewed most current lab test results and cultures  YES  Reviewed most current radiology test results   YES  Review and summation of old records today    NO  Reviewed patient's current orders and MAR    YES  PMH/SH reviewed - no change compared to H&P  ________________________________________________________________________  Care Plan discussed with:    Comments   Patient     Family      RN     Care Manager     Consultant                        Multidiciplinary team rounds were held today with , nursing, pharmacist and clinical coordinator. Patient's plan of care was discussed; medications were reviewed and discharge planning was addressed. ________________________________________________________________________  Total NON critical care TIME:  30  Minutes    Total CRITICAL CARE TIME Spent:   Minutes non procedure based      Comments   >50% of visit spent in counseling and coordination of care     ________________________________________________________________________  Raad Luther MD     Procedures: see electronic medical records for all procedures/Xrays and details which were not copied into this note but were reviewed prior to creation of Plan. LABS:  I reviewed today's most current labs and imaging studies. Pertinent labs include:  Recent Labs     09/26/22  1025 09/26/22  0335 09/26/22  0237 09/25/22 2001 09/25/22  1000   WBC  --  8.4 8.0  --  6.4   HGB 7.0* 7.8* 6.5*   < > 6.9*   HCT  --  24.9* 20.7*  --  22.3*   PLT  --  196 201  --  183    < > = values in this interval not displayed.      Recent Labs     09/26/22  0237 09/25/22  1000 09/24/22  1412    145 144   K 3.8 3.8 3.8   * 110* 108   CO2 31 32 34*   GLU 99 61* 89   BUN 11 13 11   CREA 0.81 0.81 0.91   CA 9.0 9.4 9.7   MG 2.0  --   --    PHOS 2.4*  --   --    ALB 2.2*  --  2.5*   TBILI 1.3*  --  0.7   ALT 34  --  38   INR  --   --  1.1       Signed: Raad Luther MD

## 2022-09-27 LAB
ALBUMIN SERPL-MCNC: 2 G/DL (ref 3.5–5)
ALBUMIN/GLOB SERPL: 0.6 {RATIO} (ref 1.1–2.2)
ALP SERPL-CCNC: 75 U/L (ref 45–117)
ALT SERPL-CCNC: 28 U/L (ref 12–78)
ANION GAP SERPL CALC-SCNC: 2 MMOL/L (ref 5–15)
AST SERPL-CCNC: 32 U/L (ref 15–37)
BILIRUB SERPL-MCNC: 0.4 MG/DL (ref 0.2–1)
BUN SERPL-MCNC: 11 MG/DL (ref 6–20)
BUN/CREAT SERPL: 13 (ref 12–20)
CALCIUM SERPL-MCNC: 8.8 MG/DL (ref 8.5–10.1)
CHLORIDE SERPL-SCNC: 107 MMOL/L (ref 97–108)
CO2 SERPL-SCNC: 35 MMOL/L (ref 21–32)
COMMENT, HOLDF: NORMAL
COMMENT, HOLDF: NORMAL
CREAT SERPL-MCNC: 0.82 MG/DL (ref 0.55–1.02)
ERYTHROCYTE [DISTWIDTH] IN BLOOD BY AUTOMATED COUNT: 14.2 % (ref 11.5–14.5)
GLOBULIN SER CALC-MCNC: 3.2 G/DL (ref 2–4)
GLUCOSE BLD STRIP.AUTO-MCNC: 100 MG/DL (ref 65–117)
GLUCOSE BLD STRIP.AUTO-MCNC: 69 MG/DL (ref 65–117)
GLUCOSE BLD STRIP.AUTO-MCNC: 85 MG/DL (ref 65–117)
GLUCOSE BLD STRIP.AUTO-MCNC: NORMAL MG/DL (ref 65–117)
GLUCOSE SERPL-MCNC: 115 MG/DL (ref 65–100)
HCT VFR BLD AUTO: 19.1 % (ref 35–47)
HCT VFR BLD AUTO: 22.6 % (ref 35–47)
HGB BLD-MCNC: 6.2 G/DL (ref 11.5–16)
HGB BLD-MCNC: 6.7 G/DL (ref 11.5–16)
HGB BLD-MCNC: 7 G/DL (ref 11.5–16)
MAGNESIUM SERPL-MCNC: 1.8 MG/DL (ref 1.6–2.4)
MCH RBC QN AUTO: 30.4 PG (ref 26–34)
MCHC RBC AUTO-ENTMCNC: 32.5 G/DL (ref 30–36.5)
MCV RBC AUTO: 93.6 FL (ref 80–99)
NRBC # BLD: 0 K/UL (ref 0–0.01)
NRBC BLD-RTO: 0 PER 100 WBC
PHOSPHATE SERPL-MCNC: 2 MG/DL (ref 2.6–4.7)
PLATELET # BLD AUTO: 186 K/UL (ref 150–400)
PMV BLD AUTO: 10.4 FL (ref 8.9–12.9)
POTASSIUM SERPL-SCNC: 3.2 MMOL/L (ref 3.5–5.1)
PROT SERPL-MCNC: 5.2 G/DL (ref 6.4–8.2)
RBC # BLD AUTO: 2.04 M/UL (ref 3.8–5.2)
SAMPLES BEING HELD,HOLD: NORMAL
SAMPLES BEING HELD,HOLD: NORMAL
SERVICE CMNT-IMP: NORMAL
SODIUM SERPL-SCNC: 144 MMOL/L (ref 136–145)
WBC # BLD AUTO: 7.2 K/UL (ref 3.6–11)

## 2022-09-27 PROCEDURE — 85014 HEMATOCRIT: CPT

## 2022-09-27 PROCEDURE — 77010033678 HC OXYGEN DAILY

## 2022-09-27 PROCEDURE — 36415 COLL VENOUS BLD VENIPUNCTURE: CPT

## 2022-09-27 PROCEDURE — 85027 COMPLETE CBC AUTOMATED: CPT

## 2022-09-27 PROCEDURE — 85018 HEMOGLOBIN: CPT

## 2022-09-27 PROCEDURE — 74011250637 HC RX REV CODE- 250/637: Performed by: HOSPITALIST

## 2022-09-27 PROCEDURE — 65270000046 HC RM TELEMETRY

## 2022-09-27 PROCEDURE — 74011000250 HC RX REV CODE- 250: Performed by: INTERNAL MEDICINE

## 2022-09-27 PROCEDURE — 94760 N-INVAS EAR/PLS OXIMETRY 1: CPT

## 2022-09-27 PROCEDURE — C9113 INJ PANTOPRAZOLE SODIUM, VIA: HCPCS | Performed by: HOSPITALIST

## 2022-09-27 PROCEDURE — 80053 COMPREHEN METABOLIC PANEL: CPT

## 2022-09-27 PROCEDURE — 74011250637 HC RX REV CODE- 250/637: Performed by: INTERNAL MEDICINE

## 2022-09-27 PROCEDURE — 74011000250 HC RX REV CODE- 250: Performed by: HOSPITALIST

## 2022-09-27 PROCEDURE — 82962 GLUCOSE BLOOD TEST: CPT

## 2022-09-27 PROCEDURE — 83735 ASSAY OF MAGNESIUM: CPT

## 2022-09-27 PROCEDURE — 84100 ASSAY OF PHOSPHORUS: CPT

## 2022-09-27 PROCEDURE — 99233 SBSQ HOSP IP/OBS HIGH 50: CPT | Performed by: PSYCHIATRY & NEUROLOGY

## 2022-09-27 PROCEDURE — 74011250636 HC RX REV CODE- 250/636: Performed by: HOSPITALIST

## 2022-09-27 RX ADMIN — TIMOLOL MALEATE 1 DROP: 5 SOLUTION/ DROPS OPHTHALMIC at 09:44

## 2022-09-27 RX ADMIN — SODIUM CHLORIDE, PRESERVATIVE FREE 10 ML: 5 INJECTION INTRAVENOUS at 21:59

## 2022-09-27 RX ADMIN — SODIUM CHLORIDE, PRESERVATIVE FREE 10 ML: 5 INJECTION INTRAVENOUS at 18:55

## 2022-09-27 RX ADMIN — LEVETIRACETAM 250 MG: 250 TABLET, FILM COATED ORAL at 08:24

## 2022-09-27 RX ADMIN — TIMOLOL MALEATE 1 DROP: 5 SOLUTION/ DROPS OPHTHALMIC at 21:59

## 2022-09-27 RX ADMIN — PANTOPRAZOLE SODIUM 40 MG: 40 INJECTION, POWDER, FOR SOLUTION INTRAVENOUS at 08:24

## 2022-09-27 RX ADMIN — BRIMONIDINE TARTRATE 1 DROP: 2 SOLUTION OPHTHALMIC at 21:58

## 2022-09-27 RX ADMIN — BRIMONIDINE TARTRATE 1 DROP: 2 SOLUTION OPHTHALMIC at 09:44

## 2022-09-27 RX ADMIN — LEVOTHYROXINE SODIUM 112 MCG: 0.11 TABLET ORAL at 08:24

## 2022-09-27 RX ADMIN — POTASSIUM BICARBONATE 20 MEQ: 782 TABLET, EFFERVESCENT ORAL at 14:12

## 2022-09-27 RX ADMIN — LEVETIRACETAM 250 MG: 250 TABLET, FILM COATED ORAL at 18:55

## 2022-09-27 RX ADMIN — ATORVASTATIN CALCIUM 80 MG: 40 TABLET, FILM COATED ORAL at 21:54

## 2022-09-27 RX ADMIN — DEXTROSE MONOHYDRATE 20 ML/HR: 100 INJECTION, SOLUTION INTRAVENOUS at 05:46

## 2022-09-27 RX ADMIN — PANTOPRAZOLE SODIUM 40 MG: 40 INJECTION, POWDER, FOR SOLUTION INTRAVENOUS at 21:54

## 2022-09-27 NOTE — PROGRESS NOTES
Speech path  Will hold swallowing therapy as the patient is NPO in the event she needs an EGD.    Edy Sierra, SLP

## 2022-09-27 NOTE — PROGRESS NOTES
Hospitalist Progress Note    NAME: Eyal Rosales   :  1928   MRN:  [id number]       Assessment / Plan:  Acute ischemic stroke s/p TNK    - Serial neuro exams.  - neurology following, input is appreciated recommendations to hold aspirin for now given drop in hemoglobin  - CT head 24 hours after tPA.did not reveal any bleed  -Unable to obtain MRI due to pacemaker is not compatible as reported to me  - ECHO showed   Left Ventricle: Normal left ventricular systolic function with a visually estimated EF of 70 - 75%. Left ventricle size is normal. Increased wall thickness. Normal wall motion. Abnormal diastolic function. Aortic Valve: Valve structure is normal. Moderate sclerosis of the aortic valve cusp. No stenosis. Tricuspid Valve: Moderate regurgitation. Pulmonary Arteries: Severe pulmonary hypertension present. The estimated PASP is 86 mmHg.  - PT/OT recommending SNF  -   - c/w high intensity statins.  - HbA1c. 5.8 compatible with prediabetes, lifestyle medication was recommended  - Monitor blood pressure  --Currently on Keppra, continue for now    Hypokalemia  - 3.2  - Replacement-proBNP       Acute blood loss anemia  Acute GI bleed likely upper GI bleed    -Hemoglobin less than 7 today around 6.2, GI recommended to repeat and if continue to be low then to repeat EGD  -Treanfsue for hgb < 7 or actively bleeding  -Hold off on AC, GI following, but this appreciated    Paroxysmal A. fib  - Was on Eliquis, on hold right now given GI bleed  - Status post pacemaker     Hypothyroidism:  -Continue levothyroxine. HTN:  -Holding home BP meds for now. Estimated discharge date:   Barriers: Clinical improvement    Code status: Full  Prophylaxis: SCD's  Recommended Disposition: SNF/LTC     Subjective:     Patient was seen and examined. No acute events overnight. Discussed with RN overnight events. All patient's questions were answered.     \"Feeling all right\"    Review of Systems:  Symptom Y/N Comments  Symptom Y/N Comments   Fever/Chills n   Chest Pain n    Poor Appetite    Edema     Cough n   Abdominal Pain n    Sputum    Joint Pain     SOB/HAWKINS n   Pruritis/Rash     Nausea/vomit n   Tolerating PT/OT     Diarrhea    Tolerating Diet y    Constipation    Other       Could NOT obtain due to:          Objective:     VITALS:   Last 24hrs VS reviewed since prior progress note. Most recent are:  Patient Vitals for the past 24 hrs:   Temp Pulse Resp BP SpO2   09/27/22 1223 98.4 °F (36.9 °C) 76 18 (!) 148/76 98 %   09/27/22 0827 98.3 °F (36.8 °C) 78 17 (!) 146/78 97 %   09/27/22 0404 97.7 °F (36.5 °C) 92 18 (!) 150/57 100 %   09/26/22 2330 98.3 °F (36.8 °C) 70 17 (!) 153/69 98 %   09/26/22 1930 97.4 °F (36.3 °C) 69 18 (!) 142/63 94 %   09/26/22 1600 -- 70 -- -- --   09/26/22 1520 98.4 °F (36.9 °C) 64 16 131/67 97 %         Intake/Output Summary (Last 24 hours) at 9/27/2022 1333  Last data filed at 9/27/2022 0640  Gross per 24 hour   Intake --   Output 575 ml   Net -575 ml          I had a face to face encounter and independently examined this patient on 9/27/2022, as outlined below:  PHYSICAL EXAM:  General: WD, WN. Alert, cooperative, no acute distress    EENT:  EOMI. Anicteric sclerae. MMM  Resp:  CTA bilaterally, no wheezing or rales. No accessory muscle use  CV:  Regular  rhythm,  No edema  GI:  Soft, Non distended, Non tender. +Bowel sounds  Neurologic:  EOMs intact. No facial asymmetry. No aphasia or slurred speech. Symmetrical strength, Sensation grossly intact. Alert and oriented X 4.     Psych:   Good insight. Not anxious nor agitated  Skin:  No rashes.   No jaundice    Reviewed most current lab test results and cultures  YES  Reviewed most current radiology test results   YES  Review and summation of old records today    NO  Reviewed patient's current orders and MAR    YES  PMH/SH reviewed - no change compared to H&P  ________________________________________________________________________  Care Plan discussed with:    Comments   Patient x    Family      RN x    Care Manager x    Consultant                       x Multidiciplinary team rounds were held today with , nursing, pharmacist and clinical coordinator. Patient's plan of care was discussed; medications were reviewed and discharge planning was addressed. ______________________________________________________________________      Comments   >50% of visit spent in counseling and coordination of care     ________________________________________________________________________  Issac MD Raza     Procedures: see electronic medical records for all procedures/Xrays and details which were not copied into this note but were reviewed prior to creation of Plan. LABS:  I reviewed today's most current labs and imaging studies. Pertinent labs include:  Recent Labs     09/27/22  0854 09/27/22  0142 09/26/22  1701 09/26/22  1025 09/26/22  0335 09/26/22  0237   WBC  --  7.2  --   --  8.4 8.0   HGB 6.7* 6.2* 8.0*   < > 7.8* 6.5*   HCT  --  19.1*  --   --  24.9* 20.7*   PLT  --  186  --   --  196 201    < > = values in this interval not displayed.        Recent Labs     09/27/22  0142 09/26/22  0237 09/25/22  1000 09/24/22  1412    141 145 144   K 3.2* 3.8 3.8 3.8    109* 110* 108   CO2 35* 31 32 34*   * 99 61* 89   BUN 11 11 13 11   CREA 0.82 0.81 0.81 0.91   CA 8.8 9.0 9.4 9.7   MG 1.8 2.0  --   --    PHOS 2.0* 2.4*  --   --    ALB 2.0* 2.2*  --  2.5*   TBILI 0.4 1.3*  --  0.7   ALT 28 34  --  38   INR  --   --   --  1.1         Signed: Issac Person MD

## 2022-09-27 NOTE — PROGRESS NOTES
Problem: Pressure Injury - Risk of  Goal: *Prevention of pressure injury  Description: Document Manuel Scale and appropriate interventions in the flowsheet. Outcome: Progressing Towards Goal  Note: Pressure Injury Interventions:  Sensory Interventions: Keep linens dry and wrinkle-free, Minimize linen layers, Turn and reposition approx. every two hours (pillows and wedges if needed)    Moisture Interventions: Absorbent underpads    Activity Interventions: PT/OT evaluation    Mobility Interventions: HOB 30 degrees or less, PT/OT evaluation, Turn and reposition approx.  every two hours(pillow and wedges)    Nutrition Interventions: Document food/fluid/supplement intake    Friction and Shear Interventions: Minimize layers                Problem: Patient Education: Go to Patient Education Activity  Goal: Patient/Family Education  Outcome: Progressing Towards Goal     Problem: TIA/CVA Stroke: Day 2 Until Discharge  Goal: Off Pathway (Use only if patient is Off Pathway)  Outcome: Progressing Towards Goal  Goal: Activity/Safety  Outcome: Progressing Towards Goal  Goal: Nutrition/Diet  Outcome: Progressing Towards Goal  Goal: Discharge Planning  Outcome: Progressing Towards Goal  Goal: Medications  Outcome: Progressing Towards Goal  Goal: Respiratory  Outcome: Progressing Towards Goal  Goal: Treatments/Interventions/Procedures  Outcome: Progressing Towards Goal  Goal: Psychosocial  Outcome: Progressing Towards Goal  Goal: *Verbalizes anxiety and depression are reduced or absent  Outcome: Progressing Towards Goal  Goal: *Absence of aspiration  Outcome: Progressing Towards Goal  Goal: *Absence of deep venous thrombosis signs and symptoms(Stroke Metric)  Outcome: Progressing Towards Goal  Goal: *Optimal pain control at patient's stated goal  Outcome: Progressing Towards Goal  Goal: *Tolerating diet  Outcome: Progressing Towards Goal  Goal: *Ability to perform ADLs and demonstrates progressive mobility and function  Outcome: Progressing Towards Goal     Problem: Ischemic Stroke: Discharge Outcomes  Goal: *Verbalizes anxiety and depression are reduced or absent  Outcome: Progressing Towards Goal  Goal: *Verbalize understanding of risk factor modification(Stroke Metric)  Outcome: Progressing Towards Goal  Goal: *Hemodynamically stable  Outcome: Progressing Towards Goal  Goal: *Absence of aspiration pneumonia  Outcome: Progressing Towards Goal  Goal: *Aware of needed dietary changes  Outcome: Progressing Towards Goal  Goal: *Verbalize understanding of prescribed medications including anti-coagulants, anti-lipid, and/or anti-platelets(Stroke Metric)  Outcome: Progressing Towards Goal  Goal: *Tolerating diet  Outcome: Progressing Towards Goal  Goal: *Aware of follow-up diagnostics related to anticoagulants  Outcome: Progressing Towards Goal  Goal: *Ability to perform ADLs and demonstrates progressive mobility and function  Outcome: Progressing Towards Goal  Goal: *Absence of DVT(Stroke Metric)  Outcome: Progressing Towards Goal  Goal: *Absence of aspiration  Outcome: Progressing Towards Goal  Goal: *Optimal pain control at patient's stated goal  Outcome: Progressing Towards Goal  Goal: *Home safety concerns addressed  Outcome: Progressing Towards Goal  Goal: *Describes available resources and support systems  Outcome: Progressing Towards Goal  Goal: *Verbalizes understanding of activation of EMS(911) for stroke symptoms(Stroke Metric)  Outcome: Progressing Towards Goal  Goal: *Understands and describes signs and symptoms to report to providers(Stroke Metric)  Outcome: Progressing Towards Goal  Goal: *Neurolgocially stable (absence of additional neurological deficits)  Outcome: Progressing Towards Goal  Goal: *Verbalizes importance of follow-up with primary care physician(Stroke Metric)  Outcome: Progressing Towards Goal  Goal: *Smoking cessation discussed,if applicable(Stroke Metric)  Outcome: Progressing Towards Goal  Goal: *Depression screening completed(Stroke Metric)  Outcome: Progressing Towards Goal

## 2022-09-27 NOTE — PROGRESS NOTES
Reason for Admission:   Ischemic Stroke                    RUR Score:   15%               PCP: First and Last name:   Nilo Stearns MD     Name of Practice:    Are you a current patient: Yes/No:    Approximate date of last visit: Unsure of last visit   Can you participate in a virtual visit if needed:     Do you (patient/family) have any concerns for transition/discharge? No Concerns              Plan for utilizing home health:   Open with [facility]    Current Advanced Directive/Advance Care Plan:  FULL CODE  CM confirmed with patient's daughter that she is a Full Code      Healthcare Decision Maker:   Click here to complete [place] including selection of the Healthcare Decision Maker Relationship (ie \"Primary\")            Primary Decision Maker: Donald Mendoza - Child - [phone]    Transition of Care Plan:        CM spoke with patient's daughter over the phone. Patient lives at home with her daughter. There are steps to enter the home. Patient has a wheelchair, walker, shower chair, and bedside commode at home. Patient also wears 2L oxygen at home through [facility]. Patient was open with [facility] according to patient's daughter. CM explained recs for SNF. Patient's daughter states that her and the patient would prefer for patient to come back home with [facility] at discharge. Patient's daughter will be her transport. Patient uses the [facility] in [address] [place] [address].   Current Dispo: Home with [facility]

## 2022-09-27 NOTE — PROGRESS NOTES
Problem: Pressure Injury - Risk of  Goal: *Prevention of pressure injury  Description: Document Manuel Scale and appropriate interventions in the flowsheet.   Outcome: Progressing Towards Goal  Note: Pressure Injury Interventions:  Sensory Interventions: Assess changes in LOC    Moisture Interventions: Absorbent underpads    Activity Interventions: Increase time out of bed    Mobility Interventions: HOB 30 degrees or less    Nutrition Interventions: Document food/fluid/supplement intake    Friction and Shear Interventions: Minimize layers                Problem: Patient Education: Go to Patient Education Activity  Goal: Patient/Family Education  Outcome: Progressing Towards Goal     Problem: Patient Education: Go to Patient Education Activity  Goal: Patient/Family Education  Outcome: Progressing Towards Goal     Problem: TIA/CVA Stroke: 0-24 hours  Goal: Off Pathway (Use only if patient is Off Pathway)  Outcome: Progressing Towards Goal  Goal: Activity/Safety  Outcome: Progressing Towards Goal  Goal: Consults, if ordered  Outcome: Progressing Towards Goal  Goal: Diagnostic Test/Procedures  Outcome: Progressing Towards Goal  Goal: Nutrition/Diet  Outcome: Progressing Towards Goal  Goal: Discharge Planning  Outcome: Progressing Towards Goal  Goal: Medications  Outcome: Progressing Towards Goal  Goal: Respiratory  Outcome: Progressing Towards Goal  Goal: Treatments/Interventions/Procedures  Outcome: Progressing Towards Goal  Goal: Minimize risk of bleeding post-thrombolytic infusion  Outcome: Progressing Towards Goal  Goal: Monitor for complications post-thrombolytic infusion  Outcome: Progressing Towards Goal  Goal: Psychosocial  Outcome: Progressing Towards Goal  Goal: *Hemodynamically stable  Outcome: Progressing Towards Goal  Goal: *Neurologically stable  Description: Absence of additional neurological deficits    Outcome: Progressing Towards Goal  Goal: *Verbalizes anxiety and depression are reduced or absent  Outcome: Progressing Towards Goal  Goal: *Absence of Signs of Aspiration on Current Diet  Outcome: Progressing Towards Goal  Goal: *Absence of deep venous thrombosis signs and symptoms(Stroke Metric)  Outcome: Progressing Towards Goal  Goal: *Ability to perform ADLs and demonstrates progressive mobility and function  Outcome: Progressing Towards Goal  Goal: *Stroke education started(Stroke Metric)  Outcome: Progressing Towards Goal  Goal: *Dysphagia screen performed(Stroke Metric)  Outcome: Progressing Towards Goal  Goal: *Rehab consulted(Stroke Metric)  Outcome: Progressing Towards Goal     Problem: TIA/CVA Stroke: Day 2 Until Discharge  Goal: Off Pathway (Use only if patient is Off Pathway)  Outcome: Progressing Towards Goal  Goal: Activity/Safety  Outcome: Progressing Towards Goal  Goal: Diagnostic Test/Procedures  Outcome: Progressing Towards Goal  Goal: Nutrition/Diet  Outcome: Progressing Towards Goal  Goal: Discharge Planning  Outcome: Progressing Towards Goal  Goal: Medications  Outcome: Progressing Towards Goal  Goal: Respiratory  Outcome: Progressing Towards Goal  Goal: Treatments/Interventions/Procedures  Outcome: Progressing Towards Goal  Goal: Psychosocial  Outcome: Progressing Towards Goal  Goal: *Verbalizes anxiety and depression are reduced or absent  Outcome: Progressing Towards Goal  Goal: *Absence of aspiration  Outcome: Progressing Towards Goal  Goal: *Absence of deep venous thrombosis signs and symptoms(Stroke Metric)  Outcome: Progressing Towards Goal  Goal: *Optimal pain control at patient's stated goal  Outcome: Progressing Towards Goal  Goal: *Tolerating diet  Outcome: Progressing Towards Goal  Goal: *Ability to perform ADLs and demonstrates progressive mobility and function  Outcome: Progressing Towards Goal  Goal: *Stroke education continued(Stroke Metric)  Outcome: Progressing Towards Goal     Problem: Ischemic Stroke: Discharge Outcomes  Goal: *Verbalizes anxiety and depression are reduced or absent  Outcome: Progressing Towards Goal  Goal: *Verbalize understanding of risk factor modification(Stroke Metric)  Outcome: Progressing Towards Goal  Goal: *Hemodynamically stable  Outcome: Progressing Towards Goal  Goal: *Absence of aspiration pneumonia  Outcome: Progressing Towards Goal  Goal: *Aware of needed dietary changes  Outcome: Progressing Towards Goal  Goal: *Verbalize understanding of prescribed medications including anti-coagulants, anti-lipid, and/or anti-platelets(Stroke Metric)  Outcome: Progressing Towards Goal  Goal: *Tolerating diet  Outcome: Progressing Towards Goal  Goal: *Aware of follow-up diagnostics related to anticoagulants  Outcome: Progressing Towards Goal  Goal: *Ability to perform ADLs and demonstrates progressive mobility and function  Outcome: Progressing Towards Goal  Goal: *Absence of DVT(Stroke Metric)  Outcome: Progressing Towards Goal  Goal: *Absence of aspiration  Outcome: Progressing Towards Goal  Goal: *Optimal pain control at patient's stated goal  Outcome: Progressing Towards Goal  Goal: *Home safety concerns addressed  Outcome: Progressing Towards Goal  Goal: *Describes available resources and support systems  Outcome: Progressing Towards Goal  Goal: *Verbalizes understanding of activation of EMS(911) for stroke symptoms(Stroke Metric)  Outcome: Progressing Towards Goal  Goal: *Understands and describes signs and symptoms to report to providers(Stroke Metric)  Outcome: Progressing Towards Goal  Goal: *Neurolgocially stable (absence of additional neurological deficits)  Outcome: Progressing Towards Goal  Goal: *Verbalizes importance of follow-up with primary care physician(Stroke Metric)  Outcome: Progressing Towards Goal  Goal: *Smoking cessation discussed,if applicable(Stroke Metric)  Outcome: Progressing Towards Goal  Goal: *Depression screening completed(Stroke Metric)  Outcome: Progressing Towards Goal     Problem: Dysphagia (Adult)  Goal: *Speech Goal: (INSERT TEXT)  Outcome: Progressing Towards Goal     Problem: Patient Education: Go to Patient Education Activity  Goal: Patient/Family Education  Outcome: Progressing Towards Goal     Problem: Patient Education: Go to Patient Education Activity  Goal: Patient/Family Education  Outcome: Progressing Towards Goal     Problem: Falls - Risk of  Goal: *Absence of Falls  Description: Document Jhoana Garcia Fall Risk and appropriate interventions in the flowsheet.   Outcome: Progressing Towards Goal

## 2022-09-27 NOTE — PROGRESS NOTES
End of Shift Note     Bedside shift change report given to [facility],, RN (oncoming nurse) by Andrea Brantley RN (offgoing nurse). Report included the following information SBAR, Kardex, Intake/Output, MAR, Recent Results, and Cardiac Rhythm NSR     Shift worked: 7a-7p   Shift summary and any significant changes:     Per NP, please draw labs peripherally, don't use endurance catheter. Concerns for physician to address: NONE      Zone phone for oncoming shift:   [phone]      Activity:  Activity Level: Chair  Number times ambulated in hallways past shift: 0  Number of times OOB to chair past shift: 0     Cardiac:   Cardiac Monitoring: Yes      Cardiac Rhythm: AV Paced     Access:  Current line(s): PIV      Genitourinary:   Urinary status: sol     Respiratory:   O2 Device: Nasal cannula  Chronic home O2 use?: NO  Incentive spirometer at bedside: YES     GI:  Last Bowel Movement Date: 09/25/22  Current diet:  ADULT DIET Dysphagia - Pureed  Passing flatus: YES  Tolerating current diet: YES     Pain Management:   Patient states pain is manageable on current regimen: YES     Skin:  Manuel Score: 16  Interventions: increase time out of bed, PT/OT consult, and internal/external urinary devices    Patient Safety:  Fall Score:  Total Score: 4  Interventions: bed/chair alarm, assistive device (walker, cane, etc), gripper socks, and pt to call before getting OOB  High Fall Risk: Yes     Length of Stay:  Expected LOS: 2d 21h  Actual LOS: 3

## 2022-09-27 NOTE — PROGRESS NOTES
End of Shift Note    Bedside shift change report given to Sherrlyn Olszewski, RN (oncoming nurse) by Zoraida Castaneda RN (offgoing nurse). Report included the following information SBAR, Kardex, Intake/Output, MAR, Recent Results, and Cardiac Rhythm NSR    Shift worked:  night   Shift summary and any significant changes:     -Q6 H&H NEXT DUE AT 0900  -LAST BM 9/27/22 LIZ LOOKING; MD AWARE. CHG bathed, sol care done, IV infusing, Q6HR blood glucose check. Call bell and phone within reach of patient. Tele monitored, made comfortable, bed alarm on zone 2. Concerns for physician to address: NONE     Zone phone for oncoming shift:      [phone]     Activity:  Activity Level: Chair  Number times ambulated in hallways past shift: 0  Number of times OOB to chair past shift: 0    Cardiac:   Cardiac Monitoring: Yes      Cardiac Rhythm: AV Paced    Access:  Current line(s): PIV     Genitourinary:   Urinary status: sol    Respiratory:   O2 Device: Nasal cannula  Chronic home O2 use?: NO  Incentive spirometer at bedside: YES       GI:  Last Bowel Movement Date: 09/25/22  Current diet:  ADULT DIET Dysphagia - Pureed  Passing flatus: YES  Tolerating current diet: YES       Pain Management:   Patient states pain is manageable on current regimen: YES    Skin:  Manuel Score: 16  Interventions: increase time out of bed, PT/OT consult, and internal/external urinary devices    Patient Safety:  Fall Score:  Total Score: 4  Interventions: bed/chair alarm, assistive device (walker, cane, etc), gripper socks, and pt to call before getting OOB  High Fall Risk: Yes    Length of Stay:  Expected LOS: 2d 21h  Actual LOS: 3      Zoraida Castaneda RN

## 2022-09-27 NOTE — CONSULTS
Date of Consultation:  September 27, 2022    Reason for Consultation:  Stroke? Chief Complaint   Patient presents with    Stroke       History of Present Illness:   Santi Douglass is a 80 y.o. female with hx of hypertension, diabetes and recent right MCA stroke s/p thrombectomy who is admitted to the hospital with aphasia and left facial droop. History is obtained from chart review as patient does not remember the events that led her to this hospitalization. It appears that patient had been at her baseline state of health when she suddenly developed weakness on the left side as well as trouble with speech. She was brought to the hospital for further evaluation. She did undergo evaluation for possible stroke with a noncontrast head CT that showed no acute hemorrhage and she was given TNKase. She did have a CTA with perfusion that showed no large vessel occlusion. She was admitted to the hospital for further evaluation. It appears that her symptoms resolved shortly after arrival in the emergency room. This morning patient reports that she is doing well. She does not remember the events that led her to this hospitalization however was able to tell me about her stroke in May. Of note it is also reported that patient had been on Eliquis for anticoagulation due to her atrial fibrillation however this was stopped 2 weeks ago due to concerns for possible GI bleed. Interval hx:   No acute events reported overnight. This morning patient reports that she is doing well and has no complaints. She continues to complain of weakness in her left leg. Laboratory results do show a downtrending hemoglobin of 6.2 this morning.     Past Medical History:   Diagnosis Date    Arthritis     Diabetes ([address]     Hard of hearing     Hypertension     Hypothyroidism     Poor historian         Past Surgical History:   Procedure Laterality Date    HX TUBAL LIGATION      IR [facility] THROMB INFUSION INTRACRANIAL  5/18/2022 No family history on file. Social History     Tobacco Use    Smoking status: Former    Smokeless tobacco: Not on file   Substance Use Topics    Alcohol use: No        No Known Allergies     Prior to Admission medications    Medication Sig Start Date End Date Taking? Authorizing Provider   levETIRAcetam (Keppra) 100 mg/mL solution Take 250 mg by mouth two (2) times a day. Yes Provider, Historical   timolol (TIMOPTIC) 0.5 % ophthalmic solution Administer 1 Drop to both eyes two (2) times a day. 5/23/22  Yes Edmund Bender MD   brimonidine-timoloL (COMBIGAN) 0.2-0.5 % drop ophthalmic solution Administer 1 Drop to both eyes two (2) times a day. Yes Provider, Historical   FeroSuL 325 mg (65 mg iron) tablet Take 325 mg by mouth two (2) times daily (with meals). 8/22/22   Provider, Historical   fluticasone propionate (FLONASE) 50 mcg/actuation nasal spray SHAKE LIQUID AND USE 2 SPRAYS IN EACH NOSTRIL DAILY 8/31/22   Provider, Historical   Symbicort 80-4.5 mcg/actuation HFAA Take 2 Puffs by inhalation daily. 9/23/22   Provider, Historical   gabapentin (NEURONTIN) 100 mg capsule Take 100 mg by mouth three (3) times daily. 8/22/22   Provider, Historical   loratadine (CLARITIN) 10 mg tablet Take 10 mg by mouth daily. 6/24/22   Provider, Historical   pantoprazole (PROTONIX) 40 mg tablet  9/14/22   Provider, Historical   potassium chloride (KAON 10%) 20 mEq/15 mL solution TAKE 15 ML BY MOUTH DAILY 8/31/22   Provider, Historical   levothyroxine (SYNTHROID) 125 mcg tablet Take 125 mcg by mouth daily. 8/22/22   Provider, Historical   amLODIPine (NORVASC) 5 mg tablet Take 5 mg by mouth daily. Provider, Historical   aspirin 81 mg chewable tablet Take 81 mg by mouth daily. Provider, Historical   losartan (COZAAR) 25 mg tablet Take 75 mg by mouth daily. Provider, Historical   torsemide (DEMADEX) 20 mg tablet Take 20 mg by mouth daily.     Provider, Historical   acetaminophen (TYLENOL) 325 mg tablet Take 2 Tablets by mouth every four (4) hours as needed for Fever (For temp greater than or equal to 38.5 C or 101.3 F (Unless hepatic failure or contrindicated). Give first line for fever. ). 5/23/22   Shawn Luna MD   atorvastatin (LIPITOR) 80 mg tablet Take 1 Tablet by mouth daily. Patient not taking: Reported on 9/24/2022 5/24/22   Shawn Luna MD       Review of Systems:    General, constitutional: negative  Eyes, vision: negative  Ears, nose, throat: negative  Cardiovascular, heart: negative  Respiratory: negative  Gastrointestinal: negative  Genitourinary: negative  Musculoskeletal: negative  Skin and integumentary: negative  Psychiatric: negative  Endocrine: negative  Neurological: negative, except for HPI  Hematologic/lymphatic: negative  Allergy/immunology: negative    PHYSICAL EXAMINATION:   Visit Vitals  BP (!) 148/76   Pulse 76   Temp 98.4 °F (36.9 °C)   Resp 18   Wt 167 lb 5.3 oz (75.9 kg)   SpO2 98%   BMI 27.01 kg/m²       Physical Exam:  General:  no acute distress  Neck: no carotid bruits  Lungs: clear to auscultation  Heart:  no murmurs, regular rate and rhythm   Lower extremity: no edema    Neurological exam:  Mental Status: Awake, alert, oriented to person, place October stated that  Speech and Language: Mild dysarthria. Able to name, repeat and follow commands   Fund of knowledge was preserved    Cranial nerves: II-XII  Pupils equal and reactive, visual fields intact by confrontation   Extraocular movements intact, no evidence of nystagmus or ptosis   Facial sensation intact   Facial movements symmetric   Hearing intact to soft rub bilaterally   Shoulder shrug symmetric and strong   Tongue protrusion full and midline without fasciculation or atrophy    Motor:   Normal tone and Bulk   Drift: No evidence of pronator drift     Strength testing:   deltoid triceps biceps Wrist ext. Wrist flex.  intrinsics   Right 5 5 5 5 5 5   Left 5 5 5 5 5 5     Patient had some difficulty with hearing and following commands when checking strength of the lower extremities. She was able to lift her right lower extremity antigravity with no drift. Her left lower extremity had difficulty with antigravity movements. It also appeared that she had left foot drop. Sensory:  Sensation was intact to light touch per patient. Reflexes:   Biceps Triceps  Brachiorad Patellar Achilles Plantar Hoffmans   Right  2 1 2 1 - Down Neg   Left  2 1 2 1 - Down Neg      Cerebellar testing: Finger-nose-finger was intact    Gait was deferred as patient was in the ICU    LAB DATA REVIEWED:    Lab Results   Component Value Date/Time    Hemoglobin A1c <3.8 (L) 09/26/2022 10:25 AM    Sodium 144 09/27/2022 01:42 AM    Potassium 3.2 (L) 09/27/2022 01:42 AM    Chloride 107 09/27/2022 01:42 AM    Glucose 115 (H) 09/27/2022 01:42 AM    BUN 11 09/27/2022 01:42 AM    Creatinine 0.82 09/27/2022 01:42 AM    Calcium 8.8 09/27/2022 01:42 AM    WBC 7.2 09/27/2022 01:42 AM    HCT 19.1 (L) 09/27/2022 01:42 AM    HGB 6.7 (L) 09/27/2022 08:54 AM    PLATELET 832 04/50/0911 01:42 AM       Stroke workup    Repeat head CT did not show any evidence of acute ischemia    CTA head  Independent review of the CTA head and neck revealed mild atherosclerotic disease in the anterior and posterior circulation. There is no flow-limiting stenosis. TTE:   Pending    Stroke labs:    HgBA1c    Lab Results   Component Value Date/Time    Hemoglobin A1c <3.8 (L) 09/26/2022 10:25 AM       LDL   Lab Results   Component Value Date/Time    LDL, calculated 48.4 09/26/2022 10:25 AM       EKG: A. fib      Assessment and Plan:   Monse Singer is a 80 y.o. female with hx of hypertension, diabetes and recent right MCA stroke s/p thrombectomy who is admitted to the hospital with aphasia and left facial droop concerning for possible stroke status post TNKase. It appears her neurological symptoms did improve throughout the course the admission.   Differential does include seizure    Left-sided weakness associated with difficulty with speech: Etiology is concerning for possible stroke as she is not currently on anticoagulation. Patient does appear to be back to her neurological baseline.  - Recommend maintaining SBP <220/120 for 24 hrs from symptom onset and then BP goal is less than 140/90 (this is the long term goal)   -Given that patient does have a downtrending hemoglobin, would hold off on aspirin 81 mg for now. Will defer to GI and the primary team on when this can be restarted. Would also hold off on anticoagulation for the same reason  - Risk factor modification: LDL 48 and hemoglobin A1c is 5.8%   - if LDL > 70, would start atorvastatin 40mg daily   - Ideally would obtain an MRI of the brain without contrast to determine if there is been evidence of a stroke. Unfortunately her pacemaker is not compatible. - please obtain TTE to rule out intracardiac thrombus   - would monitor on telemetry to rule out arrhythmias    - please obtain PT/OT and speech consultations  -Patient is also on a low-dose of Keppra since her prior admission due to location of the stroke. She has never been diagnosed with a seizure disorder    Atrial fibrillation: Off anticoagulation due to drop in hemoglobin which is concerning for possible GI bleed.  -We will need to consider restarting anticoagulation and antiplatelet after evaluation for her downtrending hemoglobin    We discussed extensively the importance of lifestyle modification including smoking cessation, diet, and incorporating exercise into daily routine. Thank you for the consult, will sign off. Please call with any additional questions. Please patient follow-up in neurology clinic in 1 to 2 months.       Mason Piña MD     30 minutes was spent providing medical care of this critically ill patient reviewing records, obtaining additional history from family, examining patient , discussing with collaborating physicians and nursing, and discussing treatment plans.

## 2022-09-27 NOTE — PROGRESS NOTES
Gastroenterology Daily Progress Note   [place], [place] for Dr. Rachel Oro)   [address] [facility] [address] Boyer Date: 9/24/2022     Follow up of anemia    Subjective:        Tarry stool overnight  Never got blood transfusion   Latest Reference Range & Units 9/24/22 14:12 9/25/22 10:00 9/25/22 20:01 9/26/22 02:37 9/26/22 03:35 9/26/22 10:25 9/26/22 17:01 9/27/22 01:42 9/27/22 08:54   HGB 11.5 - 16.0 g/dL 8.4 (L) 6.9 (L) 8.0 (L) 6.5 (L) 7.8 (L) 7.0 (L) 8.0 (L) 6.2 (L) 6.7 (L)   (L): Data is abnormally low    The hgb >7 were peripheral sticks and the hgb <7 were drawn from a line and felt to be diluted, falsely low by nursing  I spoke with patient's RN and both the 9/27 hgbs were drawn from her line, not peripherally  No tachycardia or hypotension  Patient feels fine      Current Facility-Administered Medications   Medication Dose Route Frequency    [Held by provider] aspirin chewable tablet 81 mg  81 mg Oral DAILY    brimonidine (ALPHAGAN) 0.2 % ophthalmic solution 1 Drop  1 Drop Both Eyes Q12H    And    timolol (TIMOPTIC) 0.5 % ophthalmic solution 1 Drop  1 Drop Both Eyes Q12H    levETIRAcetam (KEPPRA) tablet 250 mg  250 mg Oral BID    pantoprazole (PROTONIX) 40 mg in 0.9% sodium chloride 10 mL injection  40 mg IntraVENous Q12H    0.9% sodium chloride infusion 250 mL  250 mL IntraVENous PRN    glucose chewable tablet 16 g  4 Tablet Oral PRN    glucagon (GLUCAGEN) injection 1 mg  1 mg IntraMUSCular PRN    dextrose 10% infusion 0-250 mL  0-250 mL IntraVENous PRN    dextrose 10% infusion  20 mL/hr IntraVENous CONTINUOUS    sodium chloride (NS) flush 5-40 mL  5-40 mL IntraVENous Q8H    sodium chloride (NS) flush 5-40 mL  5-40 mL IntraVENous PRN    ondansetron (ZOFRAN) injection 4 mg  4 mg IntraVENous Q6H PRN    atorvastatin (LIPITOR) tablet 80 mg  80 mg Oral QHS    levothyroxine (SYNTHROID) tablet 112 mcg  112 mcg Oral ACB        Objective:     Visit Vitals  BP (!) 146/78   Pulse 78   Temp 98.3 °F (36.8 °C)   Resp 17   Wt 75.9 kg (167 lb 5.3 oz)   SpO2 97%   BMI 27.01 kg/m²   Blood pressure (!) 146/78, pulse 78, temperature 98.3 °F (36.8 °C), resp. rate 17, weight 75.9 kg (167 lb 5.3 oz), SpO2 97 %. No intake/output data recorded. 09/25 1901 - 09/27 0700  In: 480 [P.O.:100; I.V.:380]  Out: 1060 [Urine:1060]      Intake/Output Summary (Last 24 hours) at 9/27/2022 0940  Last data filed at 9/27/2022 0640  Gross per 24 hour   Intake 60 ml   Output 575 ml   Net -515 ml         Physical Exam:       General: BF nad  Chest:  CTA, No rhonchi, rales or rubs.  Nasal cannula  Heart: S1, S2, RRR  GI: Soft, NT, ND + bowel sounds  Extremities: no edema  CNS: A&O x 3      Labs:       Recent Results (from the past 24 hour(s))   HEMOGLOBIN    Collection Time: 09/26/22 10:25 AM   Result Value Ref Range    HGB 7.0 (L) 11.5 - 16.0 g/dL   LIPID PANEL    Collection Time: 09/26/22 10:25 AM   Result Value Ref Range    Cholesterol, total 96 <200 MG/DL    Triglyceride 58 <150 MG/DL    HDL Cholesterol 36 MG/DL    LDL, calculated 48.4 0 - 100 MG/DL    VLDL, calculated 11.6 MG/DL    CHOL/HDL Ratio 2.7 0.0 - 5.0     HEMOGLOBIN A1C WITH EAG    Collection Time: 09/26/22 10:25 AM   Result Value Ref Range    Hemoglobin A1c <3.8 (L) 4.0 - 5.6 %    Est. average glucose Cannot be calculated mg/dL   GLUCOSE, POC    Collection Time: 09/26/22 12:17 PM   Result Value Ref Range    Glucose (POC) 84 65 - 117 mg/dL    Performed by Ai Hernandez PCT    HEMOGLOBIN    Collection Time: 09/26/22  5:01 PM   Result Value Ref Range    HGB 8.0 (L) 11.5 - 16.0 g/dL   GLUCOSE, POC    Collection Time: 09/26/22  5:01 PM   Result Value Ref Range    Glucose (POC) 94 65 - 117 mg/dL    Performed by Ai Hernandez PCT    GLUCOSE, POC    Collection Time: 09/26/22 11:41 PM   Result Value Ref Range    Glucose (POC) 122 (H) 65 - 117 mg/dL    Performed by Charisma Clarke (PCT)    METABOLIC PANEL, COMPREHENSIVE    Collection Time: 09/27/22  1:42 AM   Result Value Ref Range Sodium 144 136 - 145 mmol/L    Potassium 3.2 (L) 3.5 - 5.1 mmol/L    Chloride 107 97 - 108 mmol/L    CO2 35 (H) 21 - 32 mmol/L    Anion gap 2 (L) 5 - 15 mmol/L    Glucose 115 (H) 65 - 100 mg/dL    BUN 11 6 - 20 MG/DL    Creatinine 0.82 0.55 - 1.02 MG/DL    BUN/Creatinine ratio 13 12 - 20      GFR est AA >60 >60 ml/min/1.73m2    GFR est non-AA >60 >60 ml/min/1.73m2    Calcium 8.8 8.5 - 10.1 MG/DL    Bilirubin, total 0.4 0.2 - 1.0 MG/DL    ALT (SGPT) 28 12 - 78 U/L    AST (SGOT) 32 15 - 37 U/L    Alk. phosphatase 75 45 - 117 U/L    Protein, total 5.2 (L) 6.4 - 8.2 g/dL    Albumin 2.0 (L) 3.5 - 5.0 g/dL    Globulin 3.2 2.0 - 4.0 g/dL    A-G Ratio 0.6 (L) 1.1 - 2.2     CBC W/O DIFF    Collection Time: 09/27/22  1:42 AM   Result Value Ref Range    WBC 7.2 3.6 - 11.0 K/uL    RBC 2.04 (L) 3.80 - 5.20 M/uL    HGB 6.2 (L) 11.5 - 16.0 g/dL    HCT 19.1 (L) 35.0 - 47.0 %    MCV 93.6 80.0 - 99.0 FL    MCH 30.4 26.0 - 34.0 PG    MCHC 32.5 30.0 - 36.5 g/dL    RDW 14.2 11.5 - 14.5 %    PLATELET 394 606 - 398 K/uL    MPV 10.4 8.9 - 12.9 FL    NRBC 0.0 0  WBC    ABSOLUTE NRBC 0.00 0.00 - 0.01 K/uL   MAGNESIUM    Collection Time: 09/27/22  1:42 AM   Result Value Ref Range    Magnesium 1.8 1.6 - 2.4 mg/dL   PHOSPHORUS    Collection Time: 09/27/22  1:42 AM   Result Value Ref Range    Phosphorus 2.0 (L) 2.6 - 4.7 MG/DL   SAMPLES BEING HELD    Collection Time: 09/27/22  1:42 AM   Result Value Ref Range    SAMPLES BEING HELD LV     COMMENT        Add-on orders for these samples will be processed based on acceptable specimen integrity and analyte stability, which may vary by analyte. SAMPLES BEING HELD    Collection Time: 09/27/22  1:42 AM   Result Value Ref Range    SAMPLES BEING HELD LV     COMMENT        Add-on orders for these samples will be processed based on acceptable specimen integrity and analyte stability, which may vary by analyte.    GLUCOSE, POC    Collection Time: 09/27/22  6:32 AM   Result Value Ref Range    Glucose (POC) 100 65 - 117 mg/dL    Performed by Charisma Clarke (PCT)    HEMOGLOBIN    Collection Time: 09/27/22  8:54 AM   Result Value Ref Range    HGB 6.7 (L) 11.5 - 16.0 g/dL   LABRCNT(wbc:2,hgb:2,hct:2,plt:2,)  Recent Labs     09/27/22  0142 09/26/22 0237 09/25/22  1000    141 145   K 3.2* 3.8 3.8    109* 110*   CO2 35* 31 32   BUN 11 11 13   CREA 0.82 0.81 0.81   * 99 61*   CA 8.8 9.0 9.4   MG 1.8 2.0  --    PHOS 2.0* 2.4*  --    LABRCNT(sgot:3,gpt:3,ap:3,tbiL:3,TP:3,ALB:3,GLOB:3,ggt:3,aml:3,amyp:3,lpse:3,hlpse:3)  Recent Labs     09/24/22  1412   INR 1.1   PTP 11.4*     Recent Labs     09/27/22  0142 09/26/22  0237 09/24/22  1412   AP 75 84 94   TP 5.2* 5.7* 6.5   ALB 2.0* 2.2* 2.5*   GLOB 3.2 3.5 4.0   BRIEFLAB(B12,FOL,FOLAT,RBCF)No results found for: FOL, RBCFLABRCNT(CPK:3,CpKMB:3,ckndx:3,troiq:3)No components found for: GLPOCBRIEFLAB(CHOL,CHOLX,CHOLP,CHLST,CHOLV,HDL,HDLC,HDLP,LDL,DLDL,LDLC,DLDLP,TGL,TGLX,TRIGL,TRIGP,CHHD,CHHDX)No results for input(s): PH, PCO2, PO2 in the last 72 hours. LABRCNT(CPK:3,CpKMB:3,ckndx:3,troiq:3)QUOC Chris  No results for input(s): CPK, CKNDX, TROIQ in the last 72 hours. 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 Previously it was felt that hgb drawn from line were diluted and erroneously low. I spoke with nurse and asked her to repeat H/H and draw it peripherally. Will make patient NPO in case EGD is needed. However, she is high risk given her severe pulmonary HTN and recent CVA's and does not want an EGD unless absolutely necessary.   EGD 9/20/2022 at [facility] - via care everywhere - reportedly normal EGD no sign of bleeding  Continue empiric ppi bid and hold blood thinners. If repeat hgb is again low, may need to consider repeat EGD. Will follow. QUOC [place]    9/27/2022  [address] [place] [address], [address] [place] [address]  [address] [place] [address]: [phone]      I have examined the patient. I have reviewed the chart and agree with the documentation recorded by the ELEAZAR, including the assessment, treatment plan, and disposition. Patient seen and examined by me. I personally performed all components of the history, physical, and medical decision making and agree with the assessment and plan with minor modifications as noted. Exam:   Awake  HEENT AT  GI: soft w/ normal bowel sounds    Plan:  She has a hx of ischemic CVA and was given TNKAse on 9.24. Her hgb was low but drawn from a line. Repeat hgb ordered. No active GI bleeding seen/reported. If hgb is still low and concerns are for a GI bleed, consider colonoscopy ossibly followed by VCE after neurology approval and family's consent. Risky colonoscopy 2/2 co morbidities (severe Pul HTN/afib/age) and recent acute CVA. Plan d/w her RN denise in detail. Pertinent labs, provider notes and radiological testing was reviewed by me  I discussed the case with the Advance Practice Provider. I have personally reviewed the history and independently examined the patient. I have reviewed the chart and agree with the documentation recorded by the Mid Level Provider, including the assessment, treatment plan, and disposition. Ramon Guzman MD  [facility](A)

## 2022-09-28 ENCOUNTER — APPOINTMENT (OUTPATIENT)
Dept: NON INVASIVE DIAGNOSTICS | Age: 87
DRG: 062 | End: 2022-09-28
Attending: STUDENT IN AN ORGANIZED HEALTH CARE EDUCATION/TRAINING PROGRAM
Payer: MEDICARE

## 2022-09-28 LAB
ALBUMIN SERPL-MCNC: 2.2 G/DL (ref 3.5–5)
ALBUMIN/GLOB SERPL: 0.6 {RATIO} (ref 1.1–2.2)
ALP SERPL-CCNC: 78 U/L (ref 45–117)
ALT SERPL-CCNC: 30 U/L (ref 12–78)
ANION GAP SERPL CALC-SCNC: 1 MMOL/L (ref 5–15)
AST SERPL-CCNC: 33 U/L (ref 15–37)
BILIRUB SERPL-MCNC: 0.5 MG/DL (ref 0.2–1)
BUN SERPL-MCNC: 7 MG/DL (ref 6–20)
BUN/CREAT SERPL: 10 (ref 12–20)
CALCIUM SERPL-MCNC: 9.3 MG/DL (ref 8.5–10.1)
CHLORIDE SERPL-SCNC: 107 MMOL/L (ref 97–108)
CO2 SERPL-SCNC: 36 MMOL/L (ref 21–32)
CREAT SERPL-MCNC: 0.7 MG/DL (ref 0.55–1.02)
ECHO AR MAX VEL PISA: 3.6 M/S
ECHO AV AREA PEAK VELOCITY: 1.5 CM2
ECHO AV AREA VTI: 2 CM2
ECHO AV AREA/BSA PEAK VELOCITY: 0.8 CM2/M2
ECHO AV AREA/BSA VTI: 1.1 CM2/M2
ECHO AV MEAN GRADIENT: 4 MMHG
ECHO AV MEAN VELOCITY: 0.9 M/S
ECHO AV PEAK GRADIENT: 8 MMHG
ECHO AV PEAK VELOCITY: 1.4 M/S
ECHO AV REGURGITANT PHT: 657.1 MILLISECOND
ECHO AV VELOCITY RATIO: 0.57
ECHO AV VTI: 28.1 CM
ECHO EST RA PRESSURE: 10 MMHG
ECHO LA DIAMETER INDEX: 1.24 CM/M2
ECHO LA DIAMETER: 2.3 CM
ECHO LA VOL 4C: 12 ML (ref 22–52)
ECHO LA VOLUME INDEX A4C: 6 ML/M2 (ref 16–34)
ECHO LV E' LATERAL VELOCITY: 7 CM/S
ECHO LV E' SEPTAL VELOCITY: 5 CM/S
ECHO LV EDV A4C: 40 ML
ECHO LV EDV INDEX A4C: 22 ML/M2
ECHO LV EJECTION FRACTION A4C: 53 %
ECHO LV ESV A4C: 19 ML
ECHO LV ESV INDEX A4C: 10 ML/M2
ECHO LV FRACTIONAL SHORTENING: 9 % (ref 28–44)
ECHO LV INTERNAL DIMENSION DIASTOLE INDEX: 1.73 CM/M2
ECHO LV INTERNAL DIMENSION DIASTOLIC: 3.2 CM (ref 3.9–5.3)
ECHO LV INTERNAL DIMENSION SYSTOLIC INDEX: 1.57 CM/M2
ECHO LV INTERNAL DIMENSION SYSTOLIC: 2.9 CM
ECHO LV IVSD: 1 CM (ref 0.6–0.9)
ECHO LV MASS 2D: 90.3 G (ref 67–162)
ECHO LV MASS INDEX 2D: 48.8 G/M2 (ref 43–95)
ECHO LV POSTERIOR WALL DIASTOLIC: 1 CM (ref 0.6–0.9)
ECHO LV RELATIVE WALL THICKNESS RATIO: 0.63
ECHO LVOT AREA: 2.5 CM2
ECHO LVOT AV VTI INDEX: 0.79
ECHO LVOT DIAM: 1.8 CM
ECHO LVOT MEAN GRADIENT: 1 MMHG
ECHO LVOT PEAK GRADIENT: 3 MMHG
ECHO LVOT PEAK VELOCITY: 0.8 M/S
ECHO LVOT STROKE VOLUME INDEX: 30.5 ML/M2
ECHO LVOT SV: 56.5 ML
ECHO LVOT VTI: 22.2 CM
ECHO MV A VELOCITY: 0.88 M/S
ECHO MV AREA PHT: 3.8 CM2
ECHO MV AREA VTI: 2 CM2
ECHO MV E DECELERATION TIME (DT): 212.1 MS
ECHO MV E VELOCITY: 0.61 M/S
ECHO MV E/A RATIO: 0.69
ECHO MV E/E' LATERAL: 8.71
ECHO MV E/E' RATIO (AVERAGED): 10.46
ECHO MV E/E' SEPTAL: 12.2
ECHO MV LVOT VTI INDEX: 1.25
ECHO MV MAX VELOCITY: 0.9 M/S
ECHO MV MEAN GRADIENT: 1 MMHG
ECHO MV MEAN VELOCITY: 0.5 M/S
ECHO MV PEAK GRADIENT: 3 MMHG
ECHO MV PRESSURE HALF TIME (PHT): 57.3 MS
ECHO MV VTI: 27.7 CM
ECHO PULMONARY ARTERY END DIASTOLIC PRESSURE: 13 MMHG
ECHO PV MAX VELOCITY: 0.9 M/S
ECHO PV PEAK GRADIENT: 3 MMHG
ECHO PV REGURGITANT MAX VELOCITY: 1.8 M/S
ECHO RIGHT VENTRICULAR SYSTOLIC PRESSURE (RVSP): 69 MMHG
ECHO RV FREE WALL PEAK S': 14 CM/S
ECHO RV TAPSE: 1.3 CM (ref 1.7–?)
ECHO TV REGURGITANT MAX VELOCITY: 3.83 M/S
ECHO TV REGURGITANT PEAK GRADIENT: 59 MMHG
ERYTHROCYTE [DISTWIDTH] IN BLOOD BY AUTOMATED COUNT: 14.4 % (ref 11.5–14.5)
ERYTHROCYTE [DISTWIDTH] IN BLOOD BY AUTOMATED COUNT: 14.5 % (ref 11.5–14.5)
GLOBULIN SER CALC-MCNC: 3.4 G/DL (ref 2–4)
GLUCOSE BLD STRIP.AUTO-MCNC: 104 MG/DL (ref 65–117)
GLUCOSE BLD STRIP.AUTO-MCNC: 107 MG/DL (ref 65–117)
GLUCOSE BLD STRIP.AUTO-MCNC: 64 MG/DL (ref 65–117)
GLUCOSE BLD STRIP.AUTO-MCNC: 83 MG/DL (ref 65–117)
GLUCOSE BLD STRIP.AUTO-MCNC: 88 MG/DL (ref 65–117)
GLUCOSE SERPL-MCNC: 92 MG/DL (ref 65–100)
HCT VFR BLD AUTO: 22.1 % (ref 35–47)
HCT VFR BLD AUTO: 24 % (ref 35–47)
HGB BLD-MCNC: 7 G/DL (ref 11.5–16)
HGB BLD-MCNC: 7.8 G/DL (ref 11.5–16)
MAGNESIUM SERPL-MCNC: 1.8 MG/DL (ref 1.6–2.4)
MCH RBC QN AUTO: 29.8 PG (ref 26–34)
MCH RBC QN AUTO: 30 PG (ref 26–34)
MCHC RBC AUTO-ENTMCNC: 31.7 G/DL (ref 30–36.5)
MCHC RBC AUTO-ENTMCNC: 32.5 G/DL (ref 30–36.5)
MCV RBC AUTO: 92.3 FL (ref 80–99)
MCV RBC AUTO: 94 FL (ref 80–99)
NRBC # BLD: 0 K/UL (ref 0–0.01)
NRBC # BLD: 0 K/UL (ref 0–0.01)
NRBC BLD-RTO: 0 PER 100 WBC
NRBC BLD-RTO: 0 PER 100 WBC
PHOSPHATE SERPL-MCNC: 2.1 MG/DL (ref 2.6–4.7)
PLATELET # BLD AUTO: 192 K/UL (ref 150–400)
PLATELET # BLD AUTO: 233 K/UL (ref 150–400)
PMV BLD AUTO: 10.6 FL (ref 8.9–12.9)
PMV BLD AUTO: 10.7 FL (ref 8.9–12.9)
POTASSIUM SERPL-SCNC: 3.4 MMOL/L (ref 3.5–5.1)
PROT SERPL-MCNC: 5.6 G/DL (ref 6.4–8.2)
RBC # BLD AUTO: 2.35 M/UL (ref 3.8–5.2)
RBC # BLD AUTO: 2.6 M/UL (ref 3.8–5.2)
SERVICE CMNT-IMP: ABNORMAL
SERVICE CMNT-IMP: NORMAL
SODIUM SERPL-SCNC: 144 MMOL/L (ref 136–145)
WBC # BLD AUTO: 5.8 K/UL (ref 3.6–11)
WBC # BLD AUTO: 6.4 K/UL (ref 3.6–11)

## 2022-09-28 PROCEDURE — 85027 COMPLETE CBC AUTOMATED: CPT

## 2022-09-28 PROCEDURE — 74011000250 HC RX REV CODE- 250: Performed by: HOSPITALIST

## 2022-09-28 PROCEDURE — 82962 GLUCOSE BLOOD TEST: CPT

## 2022-09-28 PROCEDURE — 83735 ASSAY OF MAGNESIUM: CPT

## 2022-09-28 PROCEDURE — 93306 TTE W/DOPPLER COMPLETE: CPT

## 2022-09-28 PROCEDURE — 74011250637 HC RX REV CODE- 250/637: Performed by: HOSPITALIST

## 2022-09-28 PROCEDURE — 74011250637 HC RX REV CODE- 250/637: Performed by: STUDENT IN AN ORGANIZED HEALTH CARE EDUCATION/TRAINING PROGRAM

## 2022-09-28 PROCEDURE — C9113 INJ PANTOPRAZOLE SODIUM, VIA: HCPCS | Performed by: HOSPITALIST

## 2022-09-28 PROCEDURE — 74011250636 HC RX REV CODE- 250/636: Performed by: HOSPITALIST

## 2022-09-28 PROCEDURE — 74011000250 HC RX REV CODE- 250: Performed by: INTERNAL MEDICINE

## 2022-09-28 PROCEDURE — 74011250637 HC RX REV CODE- 250/637: Performed by: PHYSICIAN ASSISTANT

## 2022-09-28 PROCEDURE — 80053 COMPREHEN METABOLIC PANEL: CPT

## 2022-09-28 PROCEDURE — 65270000046 HC RM TELEMETRY

## 2022-09-28 PROCEDURE — 94760 N-INVAS EAR/PLS OXIMETRY 1: CPT

## 2022-09-28 PROCEDURE — 36415 COLL VENOUS BLD VENIPUNCTURE: CPT

## 2022-09-28 PROCEDURE — 74011250637 HC RX REV CODE- 250/637: Performed by: INTERNAL MEDICINE

## 2022-09-28 PROCEDURE — 84100 ASSAY OF PHOSPHORUS: CPT

## 2022-09-28 RX ORDER — SORBITOL SOLUTION 70 %
30 SOLUTION, ORAL MISCELLANEOUS ONCE
Status: COMPLETED | OUTPATIENT
Start: 2022-09-28 | End: 2022-09-28

## 2022-09-28 RX ORDER — POTASSIUM CHLORIDE 20 MEQ/1
40 TABLET, EXTENDED RELEASE ORAL
Status: COMPLETED | OUTPATIENT
Start: 2022-09-28 | End: 2022-09-28

## 2022-09-28 RX ORDER — SODIUM,POTASSIUM PHOSPHATES 280-250MG
2 POWDER IN PACKET (EA) ORAL ONCE
Status: COMPLETED | OUTPATIENT
Start: 2022-09-28 | End: 2022-09-28

## 2022-09-28 RX ADMIN — LEVETIRACETAM 250 MG: 250 TABLET, FILM COATED ORAL at 18:21

## 2022-09-28 RX ADMIN — BRIMONIDINE TARTRATE 1 DROP: 2 SOLUTION OPHTHALMIC at 08:43

## 2022-09-28 RX ADMIN — ATORVASTATIN CALCIUM 80 MG: 40 TABLET, FILM COATED ORAL at 22:47

## 2022-09-28 RX ADMIN — PANTOPRAZOLE SODIUM 40 MG: 40 INJECTION, POWDER, FOR SOLUTION INTRAVENOUS at 22:48

## 2022-09-28 RX ADMIN — Medication 16 G: at 18:34

## 2022-09-28 RX ADMIN — SODIUM CHLORIDE, PRESERVATIVE FREE 10 ML: 5 INJECTION INTRAVENOUS at 22:49

## 2022-09-28 RX ADMIN — SODIUM CHLORIDE, PRESERVATIVE FREE 10 ML: 5 INJECTION INTRAVENOUS at 05:12

## 2022-09-28 RX ADMIN — POTASSIUM & SODIUM PHOSPHATES POWDER PACK 280-160-250 MG 2 PACKET: 280-160-250 PACK at 11:29

## 2022-09-28 RX ADMIN — LEVETIRACETAM 250 MG: 250 TABLET, FILM COATED ORAL at 08:39

## 2022-09-28 RX ADMIN — BRIMONIDINE TARTRATE 1 DROP: 2 SOLUTION OPHTHALMIC at 22:52

## 2022-09-28 RX ADMIN — PANTOPRAZOLE SODIUM 40 MG: 40 INJECTION, POWDER, FOR SOLUTION INTRAVENOUS at 08:39

## 2022-09-28 RX ADMIN — TIMOLOL MALEATE 1 DROP: 5 SOLUTION/ DROPS OPHTHALMIC at 08:43

## 2022-09-28 RX ADMIN — LEVOTHYROXINE SODIUM 112 MCG: 0.11 TABLET ORAL at 08:39

## 2022-09-28 RX ADMIN — SORBITOL SOLUTION (BULK) 30 ML: 70 SOLUTION at 10:46

## 2022-09-28 RX ADMIN — TIMOLOL MALEATE 1 DROP: 5 SOLUTION/ DROPS OPHTHALMIC at 22:52

## 2022-09-28 RX ADMIN — POTASSIUM CHLORIDE 40 MEQ: 20 TABLET, EXTENDED RELEASE ORAL at 11:29

## 2022-09-28 NOTE — PROGRESS NOTES
Speech Pathology Note    Chart reviewed and spoke with RN. Patient continues to remain NPO per GI on this date. Will defer PO trials for dysphagia treatment and will follow up once patient is cleared for PO intake. Thank you.     Alesha Reid M.S., CCC-SLP

## 2022-09-28 NOTE — PROGRESS NOTES
End of Shift Note     Bedside shift change report given to [place], LPN(oncoming nurse) by Andrea Brantley RN (offgoing nurse). Report included the following information SBAR, Kardex, Intake/Output, MAR, Recent Results, and Cardiac Rhythm NSR     Shift worked: 7a-7p   Shift summary and any significant changes:     Per NP, please draw labs peripherally, don't use endurance catheter. CBC Q12h due at 1630. Refused to swallow endoscopy camera. To be NPO after midnight  Pt had large amount of liquid brown stool. Concerns for physician to address: NONE      Zone phone for oncoming shift:   [phone]      Activity:  Activity Level: Chair  Number times ambulated in hallways past shift: 0  Number of times OOB to chair past shift: 0     Cardiac:   Cardiac Monitoring: Yes      Cardiac Rhythm: AV Paced     Access:  Current line(s): PIV      Genitourinary:   Urinary status: sol     Respiratory:   O2 Device: Nasal cannula  Chronic home O2 use?: NO  Incentive spirometer at bedside: YES     GI:  Last Bowel Movement Date: 09/25/22  Current diet:  ADULT DIET Dysphagia - Pureed  Passing flatus: YES  Tolerating current diet: YES     Pain Management:   Patient states pain is manageable on current regimen: YES     Skin:  Manuel Score: 16  Interventions: increase time out of bed, PT/OT consult, and internal/external urinary devices    Patient Safety:  Fall Score:  Total Score: 4  Interventions: bed/chair alarm, assistive device (walker, cane, etc), gripper socks, and pt to call before getting OOB  High Fall Risk: Yes     Length of Stay:  Expected LOS: 2d 21h  Actual LOS: 4

## 2022-09-28 NOTE — PROGRESS NOTES
1603- at bedside to perform pill cam procedure. Pt refused to swallow cam. QUOC Garcia called- pt offered options to swallow and pt continued to refuse. QUOC Garcia aware that pt refused all options. Procedure cancelled. 80- when exiting room, family member came in and requesting procedure be performed. At this time- procedure had been cancelled-family made aware. Katherine-primary nurse made aware also.  Will have PA discuss options with family in morning

## 2022-09-28 NOTE — PROGRESS NOTES
Gastroenterology Daily Progress Note   [place] for Dr. Mary Jane Ridley)   [facility]    Admit Date: 9/24/2022     Follow up of anemia    Subjective:      Hgb stable at 7.0, has never had a blood transfusionn   Still having tarry stools per nursing  VSS  Patient without any c/o    Current Facility-Administered Medications   Medication Dose Route Frequency    sorbitoL 70 % solution 30 mL  30 mL Oral ONCE    [Held by provider] aspirin chewable tablet 81 mg  81 mg Oral DAILY    brimonidine (ALPHAGAN) 0.2 % ophthalmic solution 1 Drop  1 Drop Both Eyes Q12H    And    timolol (TIMOPTIC) 0.5 % ophthalmic solution 1 Drop  1 Drop Both Eyes Q12H    levETIRAcetam (KEPPRA) tablet 250 mg  250 mg Oral BID    pantoprazole (PROTONIX) 40 mg in 0.9% sodium chloride 10 mL injection  40 mg IntraVENous Q12H    0.9% sodium chloride infusion 250 mL  250 mL IntraVENous PRN    glucose chewable tablet 16 g  4 Tablet Oral PRN    glucagon (GLUCAGEN) injection 1 mg  1 mg IntraMUSCular PRN    dextrose 10% infusion 0-250 mL  0-250 mL IntraVENous PRN    dextrose 10% infusion  20 mL/hr IntraVENous CONTINUOUS    sodium chloride (NS) flush 5-40 mL  5-40 mL IntraVENous Q8H    sodium chloride (NS) flush 5-40 mL  5-40 mL IntraVENous PRN    ondansetron (ZOFRAN) injection 4 mg  4 mg IntraVENous Q6H PRN    atorvastatin (LIPITOR) tablet 80 mg  80 mg Oral QHS    levothyroxine (SYNTHROID) tablet 112 mcg  112 mcg Oral ACB        Objective:     Visit Vitals  BP (!) 141/65   Pulse 61   Temp 97.9 °F (36.6 °C)   Resp 18   Wt 75.9 kg (167 lb 5.3 oz)   SpO2 100%   BMI 27.01 kg/m²   Blood pressure (!) 141/65, pulse 61, temperature 97.9 °F (36.6 °C), resp. rate 18, weight 75.9 kg (167 lb 5.3 oz), SpO2 100 %. No intake/output data recorded.     09/26 1901 - 09/28 0700  In: -   Out: 900 [Urine:900]      Intake/Output Summary (Last 24 hours) at 9/28/2022 0750  Last data filed at 9/28/2022 0622  Gross per 24 hour   Intake --   Output 500 ml   Net -500 ml         Physical Exam:       General: BF nad  Chest:  CTA, No rhonchi, rales or rubs. Nasal cannula  Heart: S1, S2, RRR  GI: Soft, NT, ND + bowel sounds  Extremities: no edema  CNS: A&O x 3      Labs:       Recent Results (from the past 24 hour(s))   HEMOGLOBIN    Collection Time: 09/27/22  8:54 AM   Result Value Ref Range    HGB 6.7 (L) 11.5 - 16.0 g/dL   GLUCOSE, POC    Collection Time: 09/27/22 11:20 AM   Result Value Ref Range    Glucose (POC) 85 65 - 117 mg/dL    Performed by Claudetta Deter PCT    HGB & HCT    Collection Time: 09/27/22  1:10 PM   Result Value Ref Range    HGB 7.0 (L) 11.5 - 16.0 g/dL    HCT 22.6 (L) 35.0 - 47.0 %   GLUCOSE, POC    Collection Time: 09/27/22  4:47 PM   Result Value Ref Range    Glucose (POC) 69 65 - 117 mg/dL    Performed by Claudetta Deter PCT    GLUCOSE, POC    Collection Time: 09/28/22 12:10 AM   Result Value Ref Range    Glucose (POC) 107 65 - 117 mg/dL    Performed by Polo Peters PCT    METABOLIC PANEL, COMPREHENSIVE    Collection Time: 09/28/22  4:34 AM   Result Value Ref Range    Sodium 144 136 - 145 mmol/L    Potassium 3.4 (L) 3.5 - 5.1 mmol/L    Chloride 107 97 - 108 mmol/L    CO2 36 (H) 21 - 32 mmol/L    Anion gap 1 (L) 5 - 15 mmol/L    Glucose 92 65 - 100 mg/dL    BUN 7 6 - 20 MG/DL    Creatinine 0.70 0.55 - 1.02 MG/DL    BUN/Creatinine ratio 10 (L) 12 - 20      GFR est AA >60 >60 ml/min/1.73m2    GFR est non-AA >60 >60 ml/min/1.73m2    Calcium 9.3 8.5 - 10.1 MG/DL    Bilirubin, total 0.5 0.2 - 1.0 MG/DL    ALT (SGPT) 30 12 - 78 U/L    AST (SGOT) 33 15 - 37 U/L    Alk.  phosphatase 78 45 - 117 U/L    Protein, total 5.6 (L) 6.4 - 8.2 g/dL    Albumin 2.2 (L) 3.5 - 5.0 g/dL    Globulin 3.4 2.0 - 4.0 g/dL    A-G Ratio 0.6 (L) 1.1 - 2.2     MAGNESIUM    Collection Time: 09/28/22  4:34 AM   Result Value Ref Range    Magnesium 1.8 1.6 - 2.4 mg/dL   PHOSPHORUS    Collection Time: 09/28/22  4:34 AM   Result Value Ref Range    Phosphorus 2.1 (L) 2.6 - 4.7 MG/DL   CBC W/O DIFF    Collection Time: 09/28/22  4:34 AM   Result Value Ref Range    WBC 5.8 3.6 - 11.0 K/uL    RBC 2.35 (L) 3.80 - 5.20 M/uL    HGB 7.0 (L) 11.5 - 16.0 g/dL    HCT 22.1 (L) 35.0 - 47.0 %    MCV 94.0 80.0 - 99.0 FL    MCH 29.8 26.0 - 34.0 PG    MCHC 31.7 30.0 - 36.5 g/dL    RDW 14.4 11.5 - 14.5 %    PLATELET 489 151 - 028 K/uL    MPV 10.7 8.9 - 12.9 FL    NRBC 0.0 0  WBC    ABSOLUTE NRBC 0.00 0.00 - 0.01 K/uL   LABRCNT(wbc:2,hgb:2,hct:2,plt:2,)  Recent Labs     09/28/22 0434 09/27/22 0142 09/26/22 0237    144 141   K 3.4* 3.2* 3.8    107 109*   CO2 36* 35* 31   BUN 7 11 11   CREA 0.70 0.82 0.81   GLU 92 115* 99   CA 9.3 8.8 9.0   MG 1.8 1.8 2.0   PHOS 2.1* 2.0* 2.4*   LABRCNT(sgot:3,gpt:3,ap:3,tbiL:3,TP:3,ALB:3,GLOB:3,ggt:3,aml:3,amyp:3,lpse:3,hlpse:3)  No results for input(s): INR, PTP, APTT, INREXT, INREXT in the last 72 hours. Recent Labs     09/28/22 0434 09/27/22 0142 09/26/22 0237   AP 78 75 84   TP 5.6* 5.2* 5.7*   ALB 2.2* 2.0* 2.2*   GLOB 3.4 3.2 3.5   BRIEFLAB(B12,FOL,FOLAT,RBCF)No results found for: FOL, RBCFLABRCNT(CPK:3,CpKMB:3,ckndx:3,troiq:3)No components found for: GLPOCBRIEFLAB(CHOL,CHOLX,CHOLP,CHLST,CHOLV,HDL,HDLC,HDLP,LDL,DLDL,LDLC,DLDLP,TGL,TGLX,TRIGL,TRIGP,CHHD,CHHDX)No results for input(s): PH, PCO2, PO2 in the last 72 hours. LABRCNT(CPK:3,CpKMB:3,ckndx:3,troiq:3)QUOC Davila  No results for input(s): CPK, CKNDX, TROIQ in the last 72 hours.     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 She has severe pulmonary HTN and is at higher risk for anesthesia complications. She had unremarkable EGD at [facility] on 9/12/2022 per care everywhere. We will proceed with a wireless capsule endoscopy today to further evaluate for active bleeding and if negative and hgb remains stable, can resume anticoagulation. I discussed this with Beti Friend, patient's daughter. Will give patient sorbitol this AM to clear out bowels and make NPO. Capsule will not be available until this afternoon. Continue to trend hgb and draw peripherally, not from line. QUOC Mccauley    9/28/2022  [address] [place] [address], [address]  [address] [address]  Loc: [phone]        Case d/w Garrett Adams. Hgb is stable but melena reported. Plan:  She has a hx of ischemic CVA and was given TNKAse on 9.24. Her hgb remains low and has had reported melenic stool. We will proceed with a VCE. Colonoscopy will be risky 2/2 co-morbidities (severe Pul HTN/afib/age) and recent acute CVA. Pertinent labs, provider notes and radiological testing was reviewed by me  I discussed the case with the Advance Practice Provider. I have personally reviewed the history of the patient. I have reviewed the chart and agree with the documentation recorded by the Mid Level Provider, including the assessment, treatment plan, and disposition. Denisha Guzman MD  [facility])

## 2022-09-28 NOTE — PROGRESS NOTES
Problem: Pressure Injury - Risk of  Goal: *Prevention of pressure injury  Description: Document Manuel Scale and appropriate interventions in the flowsheet.   Outcome: Progressing Towards Goal  Note: Pressure Injury Interventions:  Sensory Interventions: Assess changes in LOC    Moisture Interventions: Absorbent underpads    Activity Interventions: PT/OT evaluation    Mobility Interventions: HOB 30 degrees or less    Nutrition Interventions: Document food/fluid/supplement intake    Friction and Shear Interventions: Minimize layers                Problem: Patient Education: Go to Patient Education Activity  Goal: Patient/Family Education  Outcome: Progressing Towards Goal     Problem: Patient Education: Go to Patient Education Activity  Goal: Patient/Family Education  Outcome: Progressing Towards Goal     Problem: TIA/CVA Stroke: Day 2 Until Discharge  Goal: Off Pathway (Use only if patient is Off Pathway)  Outcome: Progressing Towards Goal  Goal: Activity/Safety  Outcome: Progressing Towards Goal  Goal: Diagnostic Test/Procedures  Outcome: Progressing Towards Goal  Goal: Nutrition/Diet  Outcome: Progressing Towards Goal  Goal: Discharge Planning  Outcome: Progressing Towards Goal  Goal: Medications  Outcome: Progressing Towards Goal  Goal: Respiratory  Outcome: Progressing Towards Goal  Goal: Treatments/Interventions/Procedures  Outcome: Progressing Towards Goal  Goal: Psychosocial  Outcome: Progressing Towards Goal  Goal: *Verbalizes anxiety and depression are reduced or absent  Outcome: Progressing Towards Goal  Goal: *Absence of aspiration  Outcome: Progressing Towards Goal  Goal: *Absence of deep venous thrombosis signs and symptoms(Stroke Metric)  Outcome: Progressing Towards Goal  Goal: *Optimal pain control at patient's stated goal  Outcome: Progressing Towards Goal  Goal: *Tolerating diet  Outcome: Progressing Towards Goal  Goal: *Ability to perform ADLs and demonstrates progressive mobility and function  Outcome: Progressing Towards Goal  Goal: *Stroke education continued(Stroke Metric)  Outcome: Progressing Towards Goal     Problem: Ischemic Stroke: Discharge Outcomes  Goal: *Verbalizes anxiety and depression are reduced or absent  Outcome: Progressing Towards Goal  Goal: *Verbalize understanding of risk factor modification(Stroke Metric)  Outcome: Progressing Towards Goal  Goal: *Hemodynamically stable  Outcome: Progressing Towards Goal  Goal: *Absence of aspiration pneumonia  Outcome: Progressing Towards Goal  Goal: *Aware of needed dietary changes  Outcome: Progressing Towards Goal  Goal: *Verbalize understanding of prescribed medications including anti-coagulants, anti-lipid, and/or anti-platelets(Stroke Metric)  Outcome: Progressing Towards Goal  Goal: *Tolerating diet  Outcome: Progressing Towards Goal  Goal: *Aware of follow-up diagnostics related to anticoagulants  Outcome: Progressing Towards Goal  Goal: *Ability to perform ADLs and demonstrates progressive mobility and function  Outcome: Progressing Towards Goal  Goal: *Absence of DVT(Stroke Metric)  Outcome: Progressing Towards Goal  Goal: *Absence of aspiration  Outcome: Progressing Towards Goal  Goal: *Optimal pain control at patient's stated goal  Outcome: Progressing Towards Goal  Goal: *Home safety concerns addressed  Outcome: Progressing Towards Goal  Goal: *Describes available resources and support systems  Outcome: Progressing Towards Goal  Goal: *Verbalizes understanding of activation of EMS(911) for stroke symptoms(Stroke Metric)  Outcome: Progressing Towards Goal  Goal: *Understands and describes signs and symptoms to report to providers(Stroke Metric)  Outcome: Progressing Towards Goal  Goal: *Neurolgocially stable (absence of additional neurological deficits)  Outcome: Progressing Towards Goal  Goal: *Verbalizes importance of follow-up with primary care physician(Stroke Metric)  Outcome: Progressing Towards Goal  Goal: *Smoking cessation discussed,if applicable(Stroke Metric)  Outcome: Progressing Towards Goal  Goal: *Depression screening completed(Stroke Metric)  Outcome: Progressing Towards Goal     Problem: Dysphagia (Adult)  Goal: *Speech Goal: (INSERT TEXT)  Outcome: Progressing Towards Goal     Problem: Patient Education: Go to Patient Education Activity  Goal: Patient/Family Education  Outcome: Progressing Towards Goal     Problem: Patient Education: Go to Patient Education Activity  Goal: Patient/Family Education  Outcome: Progressing Towards Goal

## 2022-09-28 NOTE — PROGRESS NOTES
Problem: Pressure Injury - Risk of  Goal: *Prevention of pressure injury  Description: Document Manuel Scale and appropriate interventions in the flowsheet.   Outcome: Progressing Towards Goal  Note: Pressure Injury Interventions:  Sensory Interventions: Keep linens dry and wrinkle-free, Maintain/enhance activity level    Moisture Interventions: Apply protective barrier, creams and emollients, Maintain skin hydration (lotion/cream)    Activity Interventions: Pressure redistribution bed/mattress(bed type), Increase time out of bed    Mobility Interventions: HOB 30 degrees or less, Pressure redistribution bed/mattress (bed type)    Nutrition Interventions: Document food/fluid/supplement intake    Friction and Shear Interventions: Minimize layers                Problem: TIA/CVA Stroke: Day 2 Until Discharge  Goal: Activity/Safety  Outcome: Progressing Towards Goal  Goal: Diagnostic Test/Procedures  Outcome: Progressing Towards Goal  Goal: Nutrition/Diet  Outcome: Progressing Towards Goal  Goal: Discharge Planning  Outcome: Progressing Towards Goal  Goal: Medications  Outcome: Progressing Towards Goal  Goal: Respiratory  Outcome: Progressing Towards Goal  Goal: Treatments/Interventions/Procedures  Outcome: Progressing Towards Goal  Goal: Psychosocial  Outcome: Progressing Towards Goal  Goal: *Verbalizes anxiety and depression are reduced or absent  Outcome: Progressing Towards Goal  Goal: *Absence of aspiration  Outcome: Progressing Towards Goal  Goal: *Absence of deep venous thrombosis signs and symptoms(Stroke Metric)  Outcome: Progressing Towards Goal  Goal: *Optimal pain control at patient's stated goal  Outcome: Progressing Towards Goal  Goal: *Tolerating diet  Outcome: Progressing Towards Goal  Goal: *Ability to perform ADLs and demonstrates progressive mobility and function  Outcome: Progressing Towards Goal  Goal: *Stroke education continued(Stroke Metric)  Outcome: Progressing Towards Goal

## 2022-09-28 NOTE — PROGRESS NOTES
Patient refused to swallow the pill cam. Will repeat hgb in the AM.  If stable, would restart blood thinners and follow closely.     QUOC Denise  09/28/22  4:09 PM

## 2022-09-28 NOTE — PROGRESS NOTES
Transition of Care Plan:    RUR: 15% - \"moderate risk\"  LOS: 4 days   Disposition: Home with Clinton Thompson (RN/PT/OT), resumed 24/7 caregiver support, & follow up apts  Follow up appointments: PCP & specialist as indicated  DME needed: DME recommendations for hospital bed & kevon lift acknowledged; daughter declined need for suggested DME 9/28/22  Transportation at Discharge: Pt's daughter will provide transportation at d/c; daughter to bring portable O2 tank for transport home  [place] or means to access home: Pt's daughter has access to the home      IM Medicare Letter: 2nd IM needed prior to d/c  Is patient a [place] and connected with the [facility]? No               Caregiver Contact: Pt's daughter Tamika Haley: [phone])  Discharge Caregiver contacted prior to discharge? To be contacted prior to d/c  Care Conference needed?: Not at this time    Initial note: Chart reviewed, IDR completed. MD reported HELENE within the next 24-48 hrs pending medical stability. PT/OT recommendations for SNF vs HH with 24/7 & DME (hospital bed/kevon lift) acknowledged. Pt confused; CM deferred to daughter Tamika Haley: [phone]) to discuss disposition. Daughter requested for pt to return home with [place] at the time of d/c. Per daughter, her and the pt's niece serve as pt's caregivers through [place]; daughter and niece are present in the home 24/7 to provide supplemental support/supervision as needed. CM discussed therapy team's DME recommendations; daughter declined need. CM inquired about transportation for d/c; daughter confirmed she will provide transport home. CM requested for daughter to bring pt's portable O2 tank for transport home; daughter verbalized understanding. No immediate questions/concerns identified. CM will continue to follow & remain accessible for d/c planning.     Tatyana Norwood, MSW  Care Manager, [facility]

## 2022-09-28 NOTE — PROGRESS NOTES
End of Shift Note    Bedside shift change report given to Terrence Fontanez RN (oncoming nurse) by Saint Ina, RN (offgoing nurse). Report included the following information SBAR, Kardex, Intake/Output, MAR, Recent Results, and Cardiac Rhythm NSR    Shift worked:  night   Shift summary and any significant changes:       -LAST BM 9/28/22 TARRY LOOKING; CHG bathed, sol care done, IV infusing, Q6HR blood glucose check. Call bell and phone within reach of patient. Tele monitored, made comfortable, bed alarm on zone 2. Concerns for physician to address: NONE     Zone phone for oncoming shift:  [phone]     Activity:  Activity Level: Bed Rest  Number times ambulated in hallways past shift: 0  Number of times OOB to chair past shift: 0    Cardiac:   Cardiac Monitoring: Yes      Cardiac Rhythm: AV Paced    Access:  Current line(s): PIV     Genitourinary:   Urinary status: sol    Respiratory:   O2 Device: None (Room air)  Chronic home O2 use?: NO  Incentive spirometer at bedside: YES       GI:  Last Bowel Movement Date: 09/27/22  Current diet:  ADULT DIET Dysphagia - Pureed; 5 carb choices (75 gm/meal)  Passing flatus: YES  Tolerating current diet: YES       Pain Management:   Patient states pain is manageable on current regimen: YES    Skin:  Manuel Score: 15  Interventions: increase time out of bed, PT/OT consult, and internal/external urinary devices    Patient Safety:  Fall Score:  Total Score: 4  Interventions: bed/chair alarm, assistive device (walker, cane, etc), gripper socks, and pt to call before getting OOB  High Fall Risk: Yes    Length of Stay:  Expected LOS: 2d 21h  Actual LOS: 4      Saint Ina, RN

## 2022-09-29 VITALS
HEART RATE: 60 BPM | OXYGEN SATURATION: 100 % | HEIGHT: 66 IN | DIASTOLIC BLOOD PRESSURE: 71 MMHG | BODY MASS INDEX: 26.89 KG/M2 | TEMPERATURE: 97.6 F | RESPIRATION RATE: 16 BRPM | WEIGHT: 167.33 LBS | SYSTOLIC BLOOD PRESSURE: 149 MMHG

## 2022-09-29 LAB
ABO + RH BLD: NORMAL
ALBUMIN SERPL-MCNC: 2.4 G/DL (ref 3.5–5)
ALBUMIN/GLOB SERPL: 0.6 {RATIO} (ref 1.1–2.2)
ALP SERPL-CCNC: 86 U/L (ref 45–117)
ALT SERPL-CCNC: 37 U/L (ref 12–78)
ANION GAP SERPL CALC-SCNC: 1 MMOL/L (ref 5–15)
AST SERPL-CCNC: 45 U/L (ref 15–37)
BILIRUB SERPL-MCNC: 0.6 MG/DL (ref 0.2–1)
BLD PROD TYP BPU: NORMAL
BLOOD GROUP ANTIBODIES SERPL: NORMAL
BPU ID: NORMAL
BUN SERPL-MCNC: 5 MG/DL (ref 6–20)
BUN/CREAT SERPL: 7 (ref 12–20)
CALCIUM SERPL-MCNC: 9.4 MG/DL (ref 8.5–10.1)
CHLORIDE SERPL-SCNC: 107 MMOL/L (ref 97–108)
CO2 SERPL-SCNC: 36 MMOL/L (ref 21–32)
CREAT SERPL-MCNC: 0.69 MG/DL (ref 0.55–1.02)
CROSSMATCH RESULT,%XM: NORMAL
ERYTHROCYTE [DISTWIDTH] IN BLOOD BY AUTOMATED COUNT: 14.5 % (ref 11.5–14.5)
GLOBULIN SER CALC-MCNC: 3.7 G/DL (ref 2–4)
GLUCOSE BLD STRIP.AUTO-MCNC: 101 MG/DL (ref 65–117)
GLUCOSE BLD STRIP.AUTO-MCNC: 86 MG/DL (ref 65–117)
GLUCOSE SERPL-MCNC: 99 MG/DL (ref 65–100)
HCT VFR BLD AUTO: 24.5 % (ref 35–47)
HGB BLD-MCNC: 7.7 G/DL (ref 11.5–16)
MAGNESIUM SERPL-MCNC: 1.9 MG/DL (ref 1.6–2.4)
MCH RBC QN AUTO: 29.5 PG (ref 26–34)
MCHC RBC AUTO-ENTMCNC: 31.4 G/DL (ref 30–36.5)
MCV RBC AUTO: 93.9 FL (ref 80–99)
NRBC # BLD: 0 K/UL (ref 0–0.01)
NRBC BLD-RTO: 0 PER 100 WBC
PHOSPHATE SERPL-MCNC: 2.1 MG/DL (ref 2.6–4.7)
PLATELET # BLD AUTO: 221 K/UL (ref 150–400)
PMV BLD AUTO: 10.8 FL (ref 8.9–12.9)
POTASSIUM SERPL-SCNC: 3.6 MMOL/L (ref 3.5–5.1)
PROT SERPL-MCNC: 6.1 G/DL (ref 6.4–8.2)
RBC # BLD AUTO: 2.61 M/UL (ref 3.8–5.2)
SERVICE CMNT-IMP: NORMAL
SERVICE CMNT-IMP: NORMAL
SODIUM SERPL-SCNC: 144 MMOL/L (ref 136–145)
SPECIMEN EXP DATE BLD: NORMAL
STATUS OF UNIT,%ST: NORMAL
UNIT DIVISION, %UDIV: 0
WBC # BLD AUTO: 10.1 K/UL (ref 3.6–11)

## 2022-09-29 PROCEDURE — 74011000250 HC RX REV CODE- 250: Performed by: INTERNAL MEDICINE

## 2022-09-29 PROCEDURE — 74011000250 HC RX REV CODE- 250: Performed by: HOSPITALIST

## 2022-09-29 PROCEDURE — 85027 COMPLETE CBC AUTOMATED: CPT

## 2022-09-29 PROCEDURE — 97535 SELF CARE MNGMENT TRAINING: CPT

## 2022-09-29 PROCEDURE — 92526 ORAL FUNCTION THERAPY: CPT

## 2022-09-29 PROCEDURE — 74011250637 HC RX REV CODE- 250/637: Performed by: STUDENT IN AN ORGANIZED HEALTH CARE EDUCATION/TRAINING PROGRAM

## 2022-09-29 PROCEDURE — 74011250636 HC RX REV CODE- 250/636: Performed by: HOSPITALIST

## 2022-09-29 PROCEDURE — 74011250637 HC RX REV CODE- 250/637: Performed by: HOSPITALIST

## 2022-09-29 PROCEDURE — 83735 ASSAY OF MAGNESIUM: CPT

## 2022-09-29 PROCEDURE — 80053 COMPREHEN METABOLIC PANEL: CPT

## 2022-09-29 PROCEDURE — C9113 INJ PANTOPRAZOLE SODIUM, VIA: HCPCS | Performed by: HOSPITALIST

## 2022-09-29 PROCEDURE — 82962 GLUCOSE BLOOD TEST: CPT

## 2022-09-29 PROCEDURE — 77010033678 HC OXYGEN DAILY

## 2022-09-29 PROCEDURE — 94760 N-INVAS EAR/PLS OXIMETRY 1: CPT

## 2022-09-29 PROCEDURE — 74011250637 HC RX REV CODE- 250/637: Performed by: INTERNAL MEDICINE

## 2022-09-29 PROCEDURE — 36415 COLL VENOUS BLD VENIPUNCTURE: CPT

## 2022-09-29 PROCEDURE — 84100 ASSAY OF PHOSPHORUS: CPT

## 2022-09-29 RX ORDER — PANTOPRAZOLE SODIUM 40 MG/1
40 TABLET, DELAYED RELEASE ORAL 2 TIMES DAILY
Status: DISCONTINUED | OUTPATIENT
Start: 2022-09-29 | End: 2022-09-29 | Stop reason: HOSPADM

## 2022-09-29 RX ORDER — ATORVASTATIN CALCIUM 80 MG/1
80 TABLET, FILM COATED ORAL DAILY
Qty: 30 TABLET | Refills: 0 | Status: SHIPPED | OUTPATIENT
Start: 2022-09-29 | End: 2022-10-12

## 2022-09-29 RX ORDER — SODIUM,POTASSIUM PHOSPHATES 280-250MG
2 POWDER IN PACKET (EA) ORAL ONCE
Status: COMPLETED | OUTPATIENT
Start: 2022-09-29 | End: 2022-09-29

## 2022-09-29 RX ORDER — PANTOPRAZOLE SODIUM 40 MG/1
40 TABLET, DELAYED RELEASE ORAL DAILY
Qty: 30 TABLET | Refills: 0 | Status: SHIPPED | OUTPATIENT
Start: 2022-09-29 | End: 2022-10-12

## 2022-09-29 RX ORDER — LEVETIRACETAM 250 MG/1
250 TABLET ORAL 2 TIMES DAILY
Qty: 30 TABLET | Refills: 0 | Status: SHIPPED | OUTPATIENT
Start: 2022-09-29

## 2022-09-29 RX ADMIN — TIMOLOL MALEATE 1 DROP: 5 SOLUTION/ DROPS OPHTHALMIC at 09:50

## 2022-09-29 RX ADMIN — SODIUM CHLORIDE, PRESERVATIVE FREE 10 ML: 5 INJECTION INTRAVENOUS at 13:59

## 2022-09-29 RX ADMIN — BRIMONIDINE TARTRATE 1 DROP: 2 SOLUTION OPHTHALMIC at 09:50

## 2022-09-29 RX ADMIN — LEVETIRACETAM 250 MG: 250 TABLET, FILM COATED ORAL at 09:49

## 2022-09-29 RX ADMIN — SODIUM CHLORIDE, PRESERVATIVE FREE 10 ML: 5 INJECTION INTRAVENOUS at 05:12

## 2022-09-29 RX ADMIN — LEVOTHYROXINE SODIUM 112 MCG: 0.11 TABLET ORAL at 09:49

## 2022-09-29 RX ADMIN — PANTOPRAZOLE SODIUM 40 MG: 40 INJECTION, POWDER, FOR SOLUTION INTRAVENOUS at 09:49

## 2022-09-29 RX ADMIN — POTASSIUM & SODIUM PHOSPHATES POWDER PACK 280-160-250 MG 2 PACKET: 280-160-250 PACK at 09:49

## 2022-09-29 NOTE — DISCHARGE INSTRUCTIONS
Please monitor for any signs of bleeding now that you agreed to take apixaban. This includes bloody or black stool, excess fatigue, pale skin, heart racing for an extended period of time. Please follow a puree and thin liquid diet.

## 2022-09-29 NOTE — PROGRESS NOTES
Hospital follow-up PCP transitional care appointment has been scheduled with Dr. Malcolm Blair on 10/4/22 at 1100. Pending patient discharge.   Wilber Wright, Care Management Assistant

## 2022-09-29 NOTE — PROGRESS NOTES
End of Shift Note     Bedside shift change report given to Aditya Resendez RN(oncoming nurse) by BARBER Dodson (offgoing nurse). Report included the following information SBAR, Kardex, Intake/Output, MAR, Recent Results, and Cardiac Rhythm NSR     Shift worked: Nights   Shift summary and any significant changes:      Refused to swallow endoscopy camera yesterday  To be NPO   . Concerns for physician to address: NONE      Zone phone for oncoming shift:   [phone]      Activity:  Activity Level: Chair  Number times ambulated in hallways past shift: 0  Number of times OOB to chair past shift: 0     Cardiac:   Cardiac Monitoring: Yes      Cardiac Rhythm: AV Paced     Access:  Current line(s): PIV      Genitourinary:   Urinary status: sol     Respiratory:   O2 Device: Nasal cannula  Chronic home O2 use?: NO  Incentive spirometer at bedside: YES     GI:  Last Bowel Movement Date: 09/25/22  Current diet:  ADULT DIET Dysphagia - Pureed  Passing flatus: YES  Tolerating current diet: YES     Pain Management:   Patient states pain is manageable on current regimen: YES     Skin:  Manuel Score: 16  Interventions: increase time out of bed, PT/OT consult, and internal/external urinary devices    Patient Safety:  Fall Score:  Total Score: 4  Interventions: bed/chair alarm, assistive device (walker, cane, etc), gripper socks, and pt to call before getting OOB  High Fall Risk: Yes     Length of Stay:  Expected LOS: 2d 21h  Actual LOS: 4

## 2022-09-29 NOTE — PROGRESS NOTES
Problem: Self Care Deficits Care Plan (Adult)  Goal: *Acute Goals and Plan of Care (Insert Text)  Description: FUNCTIONAL STATUS PRIOR TO ADMISSION: pt reports that she lives with her daughter and granddaughter who care for her. They assist her with bathing/self care. Family was not present to provide details re: PLOF. HOME SUPPORT: The patient lived with daughter and granddaughter who assist her. .    Occupational Therapy Goals  Initiated 9/25/2022   1. Patient will perform self-feeding with supervision/set-up within 7 day(s). 2.  Patient will perform grooming with supervision/set-up within 7 day(s). 3.  Patient will perform upper body bathing with moderate assistance  within 7 day(s). 4.  Patient will perform supine to sit EOB in preparation for adls with maximal assistance within 7 day(s). 5.  Patient will sit EOB with supervision in preparation for self care within 7 days. 6.  Patient will participate in upper extremity therapeutic exercise/activities with supervision/set-up within 7 day(s). 7.  Patient will perform toilet transfer, using best equipment with maximal assistance within 7 days. 8.  Patient will participate in performing [address] test of UE function, within 7 days. Outcome: Progressing Towards Goal   OCCUPATIONAL THERAPY TREATMENT  Patient: Keri Campbell (16 y.o. female)  Date: 9/29/2022  Diagnosis: Ischemic stroke ([facility] [address] [I63.9] <principal problem not specified>  Procedure(s) (LRB):  PROCEDURE CANCELLED - NOT PERFORMED (N/A) 1 Day Post-Op  Precautions:    Chart, occupational therapy assessment, plan of care, and goals were reviewed. ASSESSMENT  Patient continues with skilled OT services and is progressing towards goals. Pt received supine in bed, alert, yet drowsy. Pt oriented to self. Pt agreeable to OT session, pleasant and cooperative.  Pt with intermittent expressive aphasia, LUE weaker than RUE, notable impaired FM with LUE compared to RUE with FM aspects of dressing, generalized fatigue, cognition with ADLs (attention, mild perseveration on cleansing body part). Pt participated in long-sitting UB bathing from face to abdomen with overall supervision/set-up with cues for face, modA UB for proximal reaching. Pt required mod-maxA to don gown due to BUE proximal weakness L>R. Pt did require max cues to correctly use call bell, may benefit from pancake call bell. At this time, recommending SNF versus 24/7 supervision/assistance at discharge. Current Level of Function Impacting Discharge (ADLs): supervision/set-up to totalA    Other factors to consider for discharge: lives with daughter/niece whom are her paid caregivers         PLAN :  Patient continues to benefit from skilled intervention to address the above impairments. Continue treatment per established plan of care to address goals. Recommend with staff: long sitting for UB ADLs, encourage day/night orientation    Recommend next OT session: sitting EOB ADL    Recommendation for discharge: (in order for the patient to meet his/her long term goals)  Therapy up to 5 days/week in SNF setting vs HHOT with 24/7 supervision/assistance    This discharge recommendation:  Has not yet been discussed the attending provider and/or case management    IF patient discharges home will need the following DME: hospital bed, kevon lift, [facility]       SUBJECTIVE:   Patient stated I am at home.     OBJECTIVE DATA SUMMARY:   Cognitive/Behavioral Status:  Neurologic State: Alert;Drowsy  Orientation Level: Oriented to person;Disoriented to place; Disoriented to situation;Disoriented to time  Cognition: Follows commands;Decreased attention/concentration  Perception: Appears intact  Perseveration: No perseveration noted  Safety/Judgement: Decreased awareness of environment    Functional Mobility and Transfers for ADLs:  Bed Mobility:     Pt left in bed in long sitting during ADL    Balance:   Intact with support in long sitting    ADL Intervention:       Grooming  Washing Face: Supervision;Set-up    Upper Body Bathing  Bathing Assistance: Moderate assistance  Position Performed: Long sitting on bed  Cues: Verbal cues provided; Tactile cues provided;Physical assistance    Type of Bath: Partial;[place]/Soap/Water         Upper Body [address]: Maximum assistance (modA to doff pulling sleeves; maxA to don to pull proximally and to snap - L UE impaired FM)              Cognitive Retraining  Safety/Judgement: Decreased awareness of environment        Pain:  No c/o pain    Activity Tolerance:   Fair    After treatment patient left in no apparent distress:   Supine in bed, Call bell within reach, Bed / chair alarm activated, and Side rails x 3    COMMUNICATION/COLLABORATION:   The patients plan of care was discussed with: Registered nurse.      Cr Chavez OT  Time Calculation: 21 mins

## 2022-09-29 NOTE — PROGRESS NOTES
Problem: Dysphagia (Adult)  Goal: *Acute Goals and Plan of Care (Insert Text)  Description: Speech Pathology Goals  Initiated 9/26/2022    1. Patient will tolerate Puree/Thin Liquids without signs of aspiration or adverse effects within 7 days. Outcome: Progressing Towards Goal     SPEECH LANGUAGE PATHOLOGY DYSPHAGIA TREATMENT  Patient: Caterina Flores (5 y.o. female)  Date: 9/29/2022  Diagnosis: [facility] [I63.9] <principal problem not specified>  Procedure(s) (LRB):  PROCEDURE CANCELLED - NOT PERFORMED (N/A) 1 Day Post-Op  Precautions:       ASSESSMENT:  Patient continues to be followed by SLP and initial evaluation on 9/26 revealed moderate oral dysphagia and a grossly functional pharyngeal phase; Puree/Thin Liquids was recommended. Patient has been NPO the prior 2 days, per GI. Per chart review, \"Okay to resume diet per primary team from a GI standpoint\" at the time of SLP visit on this date. Patient drowsy, but easily arousable. Patient observed with belching followed by strong cough after initial sip of thin liquid via straw, which was not reproducible on subsequent trials. Patient with mildly delayed oral manipulation with minimal bites of purees, yet patient achieves efficient oral clearance. Patient declined further trials on this date. Per chart review, patient was on Puree/Thin Liquid diet during prior admission. Suspect this will be the most appropriate diet moving forward. Continue to recommend Puree/Thin Liquid with aspiration precautions outlined below. Please hold PO intake when patient drowsy, decreased responsiveness, etc. MD educated re: SLP recommendations.      PLAN:  Recommendations and Planned Interventions:  -- Puree/Thin Liquids when patient is alert/awake and actively accepting PO intake  -- Medication as Tolerated  -- 1:1 Assistance with PO, as needed  -- Small Bites/Sips  -- Sitting Upright for all PO    Patient continues to benefit from skilled intervention to address the above impairments. Continue treatment per established plan of care. Discharge Recommendations: To Be Determined     SUBJECTIVE:   Patient stated mmm that's good re: mashed potatoes. OBJECTIVE:   Cognitive and Communication Status:  Neurologic State: Drowsy, Confused  Orientation Level: Oriented to person, Disoriented to place, Disoriented to situation, Disoriented to time  Cognition: Decreased attention/concentration, Follows commands  Perception: Appears intact  Perseveration: No perseveration noted  Safety/Judgement: Decreased awareness of environment, Decreased insight into deficits    Dysphagia Treatment:    P.O. Trials:  Patient Position: Upright in bed  Vocal quality prior to P.O.: No impairment  Consistency Presented: Thin liquid;Puree  How Presented: SLP-fed/presented;Straw;Spoon     Bolus Acceptance: No impairment  Bolus Formation/Control: Impaired  Type of Impairment: Delayed  Propulsion: Delayed (# of seconds)  Oral Residue: None        Aspiration Signs/Symptoms: Strong cough                      Pain:  Pain Scale 1: Numeric (0 - 10)  Pain Intensity 1: 0       After treatment:   Patient left in no apparent distress in bed, Call bell within reach, and Nursing notified    COMMUNICATION/EDUCATION:   The patient's plan of care including recommendations, planned interventions, and recommended diet changes were discussed with: PhysicianAndrea Wong, SLP  Time Calculation: 12 mins

## 2022-09-29 NOTE — PROGRESS NOTES
Gastroenterology Daily Progress Note   [place] for Dr. Kevin Riley)   [facility]    Admit Date: 9/24/2022     Follow up of anemia    Subjective:      Hgb trending up  No tarry or bloody stools overnight  VSS  Patient refused to swallow pill cam yesterday  Patient without any c/o     Latest Reference Range & Units 9/27/22 13:10 9/28/22 04:34 9/28/22 16:35 9/29/22 00:35   HGB 11.5 - 16.0 g/dL 7.0 (L) 7.0 (L) 7.8 (L) 7.7 (L)   HCT 35.0 - 47.0 % 22.6 (L) 22.1 (L) 24.0 (L) 24.5 (L)   (L): Data is abnormally low    Current Facility-Administered Medications   Medication Dose Route Frequency    [Held by provider] aspirin chewable tablet 81 mg  81 mg Oral DAILY    brimonidine (ALPHAGAN) 0.2 % ophthalmic solution 1 Drop  1 Drop Both Eyes Q12H    And    timolol (TIMOPTIC) 0.5 % ophthalmic solution 1 Drop  1 Drop Both Eyes Q12H    levETIRAcetam (KEPPRA) tablet 250 mg  250 mg Oral BID    pantoprazole (PROTONIX) 40 mg in 0.9% sodium chloride 10 mL injection  40 mg IntraVENous Q12H    0.9% sodium chloride infusion 250 mL  250 mL IntraVENous PRN    glucose chewable tablet 16 g  4 Tablet Oral PRN    glucagon (GLUCAGEN) injection 1 mg  1 mg IntraMUSCular PRN    dextrose 10% infusion 0-250 mL  0-250 mL IntraVENous PRN    dextrose 10% infusion  20 mL/hr IntraVENous CONTINUOUS    sodium chloride (NS) flush 5-40 mL  5-40 mL IntraVENous Q8H    sodium chloride (NS) flush 5-40 mL  5-40 mL IntraVENous PRN    ondansetron (ZOFRAN) injection 4 mg  4 mg IntraVENous Q6H PRN    atorvastatin (LIPITOR) tablet 80 mg  80 mg Oral QHS    levothyroxine (SYNTHROID) tablet 112 mcg  112 mcg Oral ACB        Objective:     Visit Vitals  BP (!) 169/78   Pulse 60   Temp 98.1 °F (36.7 °C)   Resp 18   Ht 5' 6\" (1.676 m)   Wt 75.9 kg (167 lb 5.3 oz)   SpO2 96%   BMI 27.01 kg/m²   Blood pressure (!) 169/78, pulse 60, temperature 98.1 °F (36.7 °C), resp.  rate 18, height 5' 6\" (1.676 m), weight 75.9 kg (167 lb 5.3 oz), SpO2 96 %.    No intake/output data recorded. 09/27 1901 - 09/29 0700  In: -   Out: 900 [Urine:900]      Intake/Output Summary (Last 24 hours) at 9/29/2022 1001  Last data filed at 9/29/2022 0600  Gross per 24 hour   Intake --   Output 550 ml   Net -550 ml         Physical Exam:       General: BF nad  Chest:  CTA, No rhonchi, rales or rubs.  Nasal cannula  Heart: S1, S2, RRR  GI: Soft, NT, ND + bowel sounds  Extremities: no edema  CNS: Awake, alert      Labs:       Recent Results (from the past 24 hour(s))   GLUCOSE, POC    Collection Time: 09/28/22 11:12 AM   Result Value Ref Range    Glucose (POC) 88 65 - 117 mg/dL    Performed by Williams Méndez RN    ECHO ADULT COMPLETE    Collection Time: 09/28/22  3:09 PM   Result Value Ref Range    IVSd 1.0 (A) 0.6 - 0.9 cm    LVIDd 3.2 (A) 3.9 - 5.3 cm    LVIDs 2.9 cm    LVOT Diameter 1.8 cm    LVPWd 1.0 (A) 0.6 - 0.9 cm    LV Ejection Fraction A4C 53 %    LV EDV A4C 40 mL    LV ESV A4C 19 mL    LVOT Peak Gradient 3 mmHg    LVOT Mean Gradient 1 mmHg    LVOT SV 56.5 ml    LVOT Peak Velocity 0.8 m/s    LVOT VTI 22.2 cm    RVSP 69 mmHg    RV Free Wall Peak S' 14 cm/s    LA Diameter 2.3 cm    LA Volume 4C 12 (A) 22 - 52 mL    Est. RA Pressure 10 mmHg    AV Area by Peak Velocity 1.5 cm2    AV Area by VTI 2.0 cm2    AR .1 millisecond    AR Max Velocity PISA 3.6 m/s    AV Peak Gradient 8 mmHg    AV Mean Gradient 4 mmHg    AV Peak Velocity 1.4 m/s    AV Mean Velocity 0.9 m/s    AV VTI 28.1 cm    MV A Velocity 0.88 m/s    MV E Wave Deceleration Time 212.1 ms    MV E Velocity 0.61 m/s    LV E' Lateral Velocity 7 cm/s    LV E' Septal Velocity 5 cm/s    MV PHT 57.3 ms    MV Area by PHT 3.8 cm2    MV Area by VTI 2.0 cm2    MV Peak Gradient 3 mmHg    MV Mean Gradient 1 mmHg    MV Max Velocity 0.9 m/s    MV Mean Velocity 0.5 m/s    MV VTI 27.7 cm    Pulmonary Artery EDP 13 mmHg    IN Max Velocity 1.8 m/s    PV Peak Gradient 3 mmHg    PV Max Velocity 0.9 m/s    TAPSE 1.3 (A) 1.7 cm    TR Peak Gradient 59 mmHg    TR Max Velocity 3.83 m/s    Fractional Shortening 2D 9 28 - 44 %    LV ESV Index A4C 10 mL/m2    LV EDV Index A4C 22 mL/m2    LVIDd Index 1.73 cm/m2    LVIDs Index 1.57 cm/m2    LV RWT Ratio 0.63     LV Mass 2D 90.3 67 - 162 g    LV Mass 2D Index 48.8 43 - 95 g/m2    MV E/A 0.69     E/E' Ratio (Averaged) 10.46     E/E' Lateral 8.71     E/E' Septal 12.20     LVOT Stroke Volume Index 30.5 mL/m2    LVOT Area 2.5 cm2    LA Volume Index 4C 6 (A) 16 - 34 mL/m2    LA Size Index 1.24 cm/m2    AV Velocity Ratio 0.57     LVOT:AV VTI Index 0.79     ANA/BSA VTI 1.1 cm2/m2    ANA/BSA Peak Velocity 0.8 cm2/m2    MV:LVOT VTI Index 1.25    CBC W/O DIFF    Collection Time: 09/28/22  4:35 PM   Result Value Ref Range    WBC 6.4 3.6 - 11.0 K/uL    RBC 2.60 (L) 3.80 - 5.20 M/uL    HGB 7.8 (L) 11.5 - 16.0 g/dL    HCT 24.0 (L) 35.0 - 47.0 %    MCV 92.3 80.0 - 99.0 FL    MCH 30.0 26.0 - 34.0 PG    MCHC 32.5 30.0 - 36.5 g/dL    RDW 14.5 11.5 - 14.5 %    PLATELET 851 126 - 281 K/uL    MPV 10.6 8.9 - 12.9 FL    NRBC 0.0 0  WBC    ABSOLUTE NRBC 0.00 0.00 - 0.01 K/uL   GLUCOSE, POC    Collection Time: 09/28/22  6:29 PM   Result Value Ref Range    Glucose (POC) 64 (L) 65 - 117 mg/dL    Performed by Balta Yuan RN    GLUCOSE, POC    Collection Time: 09/28/22  6:51 PM   Result Value Ref Range    Glucose (POC) 83 65 - 117 mg/dL    Performed by Balta Yuan RN    GLUCOSE, POC    Collection Time: 09/28/22  9:51 PM   Result Value Ref Range    Glucose (POC) 104 65 - 117 mg/dL    Performed by Charisma Clarke (PCT)    CBC W/O DIFF    Collection Time: 09/29/22 12:35 AM   Result Value Ref Range    WBC 10.1 3.6 - 11.0 K/uL    RBC 2.61 (L) 3.80 - 5.20 M/uL    HGB 7.7 (L) 11.5 - 16.0 g/dL    HCT 24.5 (L) 35.0 - 47.0 %    MCV 93.9 80.0 - 99.0 FL    MCH 29.5 26.0 - 34.0 PG    MCHC 31.4 30.0 - 36.5 g/dL    RDW 14.5 11.5 - 14.5 %    PLATELET 675 514 - 755 K/uL    MPV 10.8 8.9 - 12.9 FL    NRBC 0.0 0  WBC    ABSOLUTE NRBC 0.00 0.00 - 7.99 K/uL   METABOLIC PANEL, COMPREHENSIVE    Collection Time: 09/29/22 12:36 AM   Result Value Ref Range    Sodium 144 136 - 145 mmol/L    Potassium 3.6 3.5 - 5.1 mmol/L    Chloride 107 97 - 108 mmol/L    CO2 36 (H) 21 - 32 mmol/L    Anion gap 1 (L) 5 - 15 mmol/L    Glucose 99 65 - 100 mg/dL    BUN 5 (L) 6 - 20 MG/DL    Creatinine 0.69 0.55 - 1.02 MG/DL    BUN/Creatinine ratio 7 (L) 12 - 20      GFR est AA >60 >60 ml/min/1.73m2    GFR est non-AA >60 >60 ml/min/1.73m2    Calcium 9.4 8.5 - 10.1 MG/DL    Bilirubin, total 0.6 0.2 - 1.0 MG/DL    ALT (SGPT) 37 12 - 78 U/L    AST (SGOT) 45 (H) 15 - 37 U/L    Alk. phosphatase 86 45 - 117 U/L    Protein, total 6.1 (L) 6.4 - 8.2 g/dL    Albumin 2.4 (L) 3.5 - 5.0 g/dL    Globulin 3.7 2.0 - 4.0 g/dL    A-G Ratio 0.6 (L) 1.1 - 2.2     MAGNESIUM    Collection Time: 09/29/22 12:36 AM   Result Value Ref Range    Magnesium 1.9 1.6 - 2.4 mg/dL   PHOSPHORUS    Collection Time: 09/29/22 12:36 AM   Result Value Ref Range    Phosphorus 2.1 (L) 2.6 - 4.7 MG/DL   GLUCOSE, POC    Collection Time: 09/29/22  8:18 AM   Result Value Ref Range    Glucose (POC) 86 65 - 117 mg/dL    Performed by [employer]  PCT    LABRCNT(wbc:2,hgb:2,hct:2,plt:2,)  Recent Labs     09/29/22  0036 09/28/22  0434 09/27/22  0142    144 144   K 3.6 3.4* 3.2*    107 107   CO2 36* 36* 35*   BUN 5* 7 11   CREA 0.69 0.70 0.82   GLU 99 92 115*   CA 9.4 9.3 8.8   MG 1.9 1.8 1.8   PHOS 2.1* 2.1* 2.0*   LABRCNT(sgot:3,gpt:3,ap:3,tbiL:3,TP:3,ALB:3,GLOB:3,ggt:3,aml:3,amyp:3,lpse:3,hlpse:3)  No results for input(s): INR, PTP, APTT, INREXT, INREXT in the last 72 hours.     Recent Labs     09/29/22  0036 09/28/22  0434 09/27/22  0142   AP 86 78 75   TP 6.1* 5.6* 5.2*   ALB 2.4* 2.2* 2.0*   GLOB 3.7 3.4 3.2   BRIEFLAB(B12,FOL,FOLAT,RBCF)No results found for: FOL, RBCFLABRCNT(CPK:3,CpKMB:3,ckndx:3,troiq:3)No components found for: GLPOCBRIEFLAB(CHOL,CHOLX,CHOLP,CHLST,CHOLV,HDL,HDLC,HDLP,LDL,DLDL,LDLC,DLDLP,TGL,TGLX,TRIGL,TRIGP,CHHD,CHHDX)No results for input(s): PH, PCO2, PO2 in the last 72 hours. LABRCNT(CPK:3,CpKMB:3,ckndx:3,troiq:3)QUOC Coe  No results for input(s): CPK, CKNDX, TROIQ in the last 72 hours. 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 Anticoagulation has been on hold due to GIB but neruology would like to resume ASAP given afib and cva. She has severe pulmonary HTN and is at higher risk for anesthesia complications. She had unremarkable EGD at [facility] on 9/12/2022 per care everywhere. We had planned to do a small bowel pill cam yesterday but patient refused to swallow it. Okay to resume anticoagulation at this time and follow hgb closely. Continue to draw hgb peripherally, not from line. Okay to resume diet per primary team from a GI standpoint. QUOC Baum    9/29/2022  [address] [facility], [address] [place] [address] [address]  [address] [place] [address] [place]: [phone]    I have examined the patient. I have reviewed the chart and agree with the documentation recorded by the ELEAZAR, including the assessment, treatment plan, and disposition. Patient seen and examined by me. I personally performed all components of the history, physical, and medical decision making and agree with the assessment and plan with minor modifications as noted. Exam:  Awake and in NAD  HEENT AT  GI: soft w/ normal bowel sounds    Plan:  She has a hx of ischemic CVA and was given TNKAse on 9.24.22.    Her hgb remains stable and she has had no more melena/hematochezia. She refused a SB Pillcam yesterday. Agree w/ careful anticoagulation re-introduction 2/2 recent CVA. If hgb remains stable and she has no bleeding recurrence she may be d/david to home/rehab. Alice Guzman MD  [facility](A)

## 2022-09-29 NOTE — DISCHARGE SUMMARY
Hospitalist Discharge Summary     Patient ID:  Chance Salmon  [id number]  27 y.o.  9/17/1928 9/24/2022    PCP on record: Jael Montano MD    Admit date: 9/24/2022  Discharge date and time: 9/29/2022    DISCHARGE DIAGNOSIS:    Acute ischemic stroke s/p TNK  Acute blood loss anemia  Acute GI bleed likely upper GI bleed  Paroxysmal A. Fib  Hypokalemia  Hypothyroidism:  HTN:    CONSULTATIONS:  IP CONSULT TO NEUROLOGY  IP CONSULT TO GASTROENTEROLOGY    Excerpted HPI from H&P of Aydin Rodgers MD:  HPI: The patient is a 30-year-old female with medical history as below who we are seeing in consultation at the request of Dr. Chandni Brand for admission for stroke status post TNKase. Briefly, the patient was brought in from home with complaints of aphasia and left-sided facial droop. However by the time the patient came to ED her symptoms had resolved however her NIH score was more than 8 hence after family discussion, ED and neurology team decided to use TNKase. We been asked admit the patient to the ICU for further monitoring. Of note patient was admitted with stroke symptoms in May 2022 in [facility].  At that time she had thrombectomy done. It is also pertinent to note that patient has a history of A. fib and was on Eliquis until 2 weeks back. Eliquis was stopped because was concern for GI bleed. On my arrival, the patient is sitting comfortably in the bed she is able to answer all further questions. Denies any shortness of breath chest pain fever chills or rigors. ______________________________________________________________________  DISCHARGE SUMMARY/HOSPITAL COURSE:  for full details see H&P, daily progress notes, labs, consult notes.      Acute ischemic stroke s/p TNK  - Serial neuro exams.  - neurology following  - since starting apixaban on discharge (see below), will not start aspirin at discharge  - CT head 24 hours after tPA.did not reveal any bleed  -Unable to obtain MRI due to pacemaker is not compatible as reported to me  - TTE with normal EF, grade I diastolic dysfunction, dilated RV, moderate MR, suboptimal study  - PT/OT recommending SNF, family elected for home health  -   - c/w high intensity statins.  - HbA1c. 5.8 compatible with prediabetes, lifestyle medication was recommended  - Monitor blood pressure    - evalled by Speech  - dc diet: puree with thin liquids, gave instructions in dc paperwork  - low-dose of Keppra since her prior admission due to location of the stroke. She has never been diagnosed with a seizure disorder. Rx for discharge     Acute blood loss anemia  Acute GI bleed likely upper GI bleed  -Hemoglobin less than 7 9/27/22 around 6.2, GI recommended to repeat and if continue to be low then to repeat EGD  - refused pillcam 9/28/22  - HgB stabilized at 7.7 at discharge  - ok to resume anticoagulation per GI    Paroxysmal A. fib  - Was on Eliquis, held on admission given GI bleed  - Status post pacemaker  - spoke with daughter regarding risks and benefits of resuming apixaban in the setting of a recent GI bleed. Family wishes to restart apixaban. Discussed warning signs including bloody/black stool, weakness, pallor, and to come to the ED if patient feels these symptoms  - resumed apixaban at discharge after GI clearance     Hypokalemia  - repleted 9/28     Hypothyroidism:  -Continue levothyroxine. HTN:  -stopped losartan, torsemide, and amlodipine on admission  - resumed amlodipine as patient mildly hypertensive prior to discharge    _______________________________________________________________________  Patient seen and examined by me on discharge day. Pertinent Findings:  Gen:    Not in distress  Chest: Clear lungs  CVS:   Regular rhythm.   No edema  Abd:  Soft, not distended, not tender  Neuro:  Alert, oriented x3, no focal deficits  _______________________________________________________________________  DISCHARGE MEDICATIONS:   Discharge Medication List as of 9/29/2022  3:54 PM        START taking these medications    Details   apixaban (ELIQUIS) 5 mg tablet Take 1 Tablet by mouth two (2) times a day for 30 days. , Normal, Disp-60 Tablet, R-0           CONTINUE these medications which have CHANGED    Details   atorvastatin (LIPITOR) 80 mg tablet Take 1 Tablet by mouth daily for 30 days. , Normal, Disp-30 Tablet, R-0      levETIRAcetam (KEPPRA) 250 mg tablet Take 1 Tablet by mouth two (2) times a day., Normal, Disp-30 Tablet, R-0      pantoprazole (PROTONIX) 40 mg tablet Take 1 Tablet by mouth daily for 30 days. , Normal, Disp-30 Tablet, R-0           CONTINUE these medications which have NOT CHANGED    Details   FeroSuL 325 mg (65 mg iron) tablet Take 325 mg by mouth two (2) times daily (with meals). , Historical Med, MADELINE      fluticasone propionate (FLONASE) 50 mcg/actuation nasal spray SHAKE LIQUID AND USE 2 SPRAYS IN EACH NOSTRIL DAILY, Historical Med      Symbicort 80-4.5 mcg/actuation HFAA Take 2 Puffs by inhalation daily. , Historical Med, MADELINE      gabapentin (NEURONTIN) 100 mg capsule Take 100 mg by mouth three (3) times daily. , Historical Med      loratadine (CLARITIN) 10 mg tablet Take 10 mg by mouth daily. , Historical Med      levothyroxine (SYNTHROID) 125 mcg tablet Take 125 mcg by mouth daily. , Historical Med      amLODIPine (NORVASC) 5 mg tablet Take 5 mg by mouth daily. , Historical Med      timolol (TIMOPTIC) 0.5 % ophthalmic solution Administer 1 Drop to both eyes two (2) times a day., Normal, Disp-10 mL, R-0      brimonidine-timoloL (COMBIGAN) 0.2-0.5 % drop ophthalmic solution Administer 1 Drop to both eyes two (2) times a day., Historical Med           STOP taking these medications       potassium chloride (KAON 10%) 20 mEq/15 mL solution Comments:   Reason for Stopping:         aspirin 81 mg chewable tablet Comments:   Reason for Stopping:         levETIRAcetam (Keppra) 100 mg/mL solution Comments:   Reason for Stopping: losartan (COZAAR) 25 mg tablet Comments:   Reason for Stopping:         torsemide (DEMADEX) 20 mg tablet Comments:   Reason for Stopping:         acetaminophen (TYLENOL) 325 mg tablet Comments:   Reason for Stopping:                 Patient Follow Up Instructions: Activity: Activity as tolerated  Diet: Dysphagia diet-pureed with thin liquids  Wound Care: None needed    Follow-up with GI, Neurology, PCP within 2 weeks. Follow-up tests/labs none    Follow-up Information       Follow up With Specialties Details Why Contact Shaila Langston MD Family Medicine Go on 10/4/2022 at 11:00am for your PCP hospital follow up regarding anticoagulation and bleeding. [address]  [phone]      Gael Rodriguez MD Gastroenterology Schedule an appointment as soon as possible for a visit in 2 week(s) follow up for stomach bleed [address] [facility]      Judy Bruno MD Neurology Schedule an appointment as soon as possible for a visit in 1 month(s) for stroke [address] [facility] [address]  Follow up This is your home health agency.  Contact the office directly if you don't hear from them within 24-48 hrs following d/c [facility] , [address] [facility] [address] [facility]  [facility], [address]   [phone]          ________________________________________________________________    Risk of deterioration: Moderate    Condition at Discharge:  Stable  __________________________________________________________________    Disposition  Home with family and home health services    ____________________________________________________________________    Code Status: Full Code  ___________________________________________________________________      Total time in minutes spent coordinating this discharge (includes going over instructions, follow-up, prescriptions, and preparing report for sign off to her PCP) :  >30 minutes    Signed:  Maile Land MD

## 2022-09-29 NOTE — PROGRESS NOTES
Problem: Pressure Injury - Risk of  Goal: *Prevention of pressure injury  Description: Document Manuel Scale and appropriate interventions in the flowsheet.   Outcome: Progressing Towards Goal  Note: Pressure Injury Interventions:  Sensory Interventions: Assess changes in LOC    Moisture Interventions: Apply protective barrier, creams and emollients    Activity Interventions: Pressure redistribution bed/mattress(bed type), Increase time out of bed    Mobility Interventions: HOB 30 degrees or less    Nutrition Interventions: Document food/fluid/supplement intake    Friction and Shear Interventions: Minimize layers                Problem: Patient Education: Go to Patient Education Activity  Goal: Patient/Family Education  Outcome: Progressing Towards Goal     Problem: Patient Education: Go to Patient Education Activity  Goal: Patient/Family Education  Outcome: Progressing Towards Goal     Problem: TIA/CVA Stroke: 0-24 hours  Goal: Off Pathway (Use only if patient is Off Pathway)  Outcome: Progressing Towards Goal  Goal: Activity/Safety  Outcome: Progressing Towards Goal  Goal: Consults, if ordered  Outcome: Progressing Towards Goal  Goal: Diagnostic Test/Procedures  Outcome: Progressing Towards Goal  Goal: Nutrition/Diet  Outcome: Progressing Towards Goal  Goal: Discharge Planning  Outcome: Progressing Towards Goal  Goal: Medications  Outcome: Progressing Towards Goal  Goal: Respiratory  Outcome: Progressing Towards Goal  Goal: Treatments/Interventions/Procedures  Outcome: Progressing Towards Goal  Goal: Minimize risk of bleeding post-thrombolytic infusion  Outcome: Progressing Towards Goal  Goal: Monitor for complications post-thrombolytic infusion  Outcome: Progressing Towards Goal  Goal: Psychosocial  Outcome: Progressing Towards Goal  Goal: *Hemodynamically stable  Outcome: Progressing Towards Goal  Goal: *Neurologically stable  Description: Absence of additional neurological deficits    Outcome: Progressing Towards Goal  Goal: *Verbalizes anxiety and depression are reduced or absent  Outcome: Progressing Towards Goal  Goal: *Absence of Signs of Aspiration on Current Diet  Outcome: Progressing Towards Goal  Goal: *Absence of deep venous thrombosis signs and symptoms(Stroke Metric)  Outcome: Progressing Towards Goal  Goal: *Ability to perform ADLs and demonstrates progressive mobility and function  Outcome: Progressing Towards Goal  Goal: *Stroke education started(Stroke Metric)  Outcome: Progressing Towards Goal  Goal: *Dysphagia screen performed(Stroke Metric)  Outcome: Progressing Towards Goal  Goal: *Rehab consulted(Stroke Metric)  Outcome: Progressing Towards Goal     Problem: TIA/CVA Stroke: Day 2 Until Discharge  Goal: Off Pathway (Use only if patient is Off Pathway)  Outcome: Progressing Towards Goal  Goal: Activity/Safety  Outcome: Progressing Towards Goal  Goal: Diagnostic Test/Procedures  Outcome: Progressing Towards Goal  Goal: Nutrition/Diet  Outcome: Progressing Towards Goal  Goal: Discharge Planning  Outcome: Progressing Towards Goal  Goal: Medications  Outcome: Progressing Towards Goal  Goal: Respiratory  Outcome: Progressing Towards Goal  Goal: Treatments/Interventions/Procedures  Outcome: Progressing Towards Goal  Goal: Psychosocial  Outcome: Progressing Towards Goal  Goal: *Verbalizes anxiety and depression are reduced or absent  Outcome: Progressing Towards Goal  Goal: *Absence of aspiration  Outcome: Progressing Towards Goal  Goal: *Absence of deep venous thrombosis signs and symptoms(Stroke Metric)  Outcome: Progressing Towards Goal  Goal: *Optimal pain control at patient's stated goal  Outcome: Progressing Towards Goal  Goal: *Tolerating diet  Outcome: Progressing Towards Goal  Goal: *Ability to perform ADLs and demonstrates progressive mobility and function  Outcome: Progressing Towards Goal  Goal: *Stroke education continued(Stroke Metric)  Outcome: Progressing Towards Goal     Problem: Ischemic Stroke: Discharge Outcomes  Goal: *Verbalizes anxiety and depression are reduced or absent  Outcome: Progressing Towards Goal  Goal: *Verbalize understanding of risk factor modification(Stroke Metric)  Outcome: Progressing Towards Goal  Goal: *Hemodynamically stable  Outcome: Progressing Towards Goal  Goal: *Absence of aspiration pneumonia  Outcome: Progressing Towards Goal  Goal: *Aware of needed dietary changes  Outcome: Progressing Towards Goal  Goal: *Verbalize understanding of prescribed medications including anti-coagulants, anti-lipid, and/or anti-platelets(Stroke Metric)  Outcome: Progressing Towards Goal  Goal: *Tolerating diet  Outcome: Progressing Towards Goal  Goal: *Aware of follow-up diagnostics related to anticoagulants  Outcome: Progressing Towards Goal  Goal: *Ability to perform ADLs and demonstrates progressive mobility and function  Outcome: Progressing Towards Goal  Goal: *Absence of DVT(Stroke Metric)  Outcome: Progressing Towards Goal  Goal: *Absence of aspiration  Outcome: Progressing Towards Goal  Goal: *Optimal pain control at patient's stated goal  Outcome: Progressing Towards Goal  Goal: *Home safety concerns addressed  Outcome: Progressing Towards Goal  Goal: *Describes available resources and support systems  Outcome: Progressing Towards Goal  Goal: *Verbalizes understanding of activation of EMS(911) for stroke symptoms(Stroke Metric)  Outcome: Progressing Towards Goal  Goal: *Understands and describes signs and symptoms to report to providers(Stroke Metric)  Outcome: Progressing Towards Goal  Goal: *Neurolgocially stable (absence of additional neurological deficits)  Outcome: Progressing Towards Goal  Goal: *Verbalizes importance of follow-up with primary care physician(Stroke Metric)  Outcome: Progressing Towards Goal  Goal: *Smoking cessation discussed,if applicable(Stroke Metric)  Outcome: Progressing Towards Goal  Goal: *Depression screening completed(Stroke Metric)  Outcome: Progressing Towards Goal     Problem: Dysphagia (Adult)  Goal: *Speech Goal: (INSERT TEXT)  Outcome: Progressing Towards Goal     Problem: Patient Education: Go to Patient Education Activity  Goal: Patient/Family Education  Outcome: Progressing Towards Goal     Problem: Patient Education: Go to Patient Education Activity  Goal: Patient/Family Education  Outcome: Progressing Towards Goal     Problem: Patient Education: Go to Patient Education Activity  Goal: Patient/Family Education  Outcome: Progressing Towards Goal     Problem: Falls - Risk of  Goal: *Absence of Falls  Description: Document Kris Fail Fall Risk and appropriate interventions in the flowsheet.   Outcome: Progressing Towards Goal

## 2022-09-29 NOTE — PROGRESS NOTES
No further CM needs identified. CM notified pt's nurse of d/c. Transition of Care Plan:     RUR: 15% - \"moderate risk\"  LOS: 5 days   Disposition: Home with [address] [facility] beds at the [facility] (RN/PT), resumed 24/7 family support, & follow up apts  Follow up appointments: PCP & specialist as indicated  DME needed: DME recommendations for hospital bed & kevon lift acknowledged; daughter declined need for suggested DME 9/28/22  Transportation at Discharge: Pt's daughter present in room to provide transportation for d/c; daughter confirmed she brought portable O2 tank for ride home  Delio Hu or means to access home: Pt's daughter has access to the home      IM Medicare Letter: 2nd IM signed 9/29/22; copy on chart  Is patient a [place] and connected with the [address] [facility]? No               Caregiver Contact: Pt's daughter Kary Mix: [phone])  Discharge Caregiver contacted prior to discharge? Daughter at bedside the day of d/c (9/29/22)  Care Conference needed?: No, HELENE 9/29/22    Update - 4:00 PM: CM met with pt and daughter at bedside, reviewed plan for d/c, and confirmed both parties are agreeable. Daughter confirmed she brought portable O2 tank for transport home. No additional questions/concerns identified. 2nd IM signed; copy on chart. Face sheet, [facility] orders, and H&P faxed to [facility] for reference; d/c summary not available at this time. CM will remain accessible for consult if additional needs arise prior to d/c. Initial note: Chart reviewed, IDR completed. MD reported HELENE for today. Pt will d/c home with [facility] (RN/PT), resumed 24/7 caregiver support, & follow up apts. CM contacted [facility] ([phone]) & informed agency of HELENE this afternoon; intake representative requested for [facility] orders (RN/PT) to central intake office for reference (f: [phone]).  Agency's information reflected in AVS. PCP follow up apt secured for 10/4/22 at 11:00 AM; apt information reflected in AVS. Pt's daughter Jamil Abbott Lizbet Mcclure) will provide transportation at d/c. CM requested for daughter to bring pt's portable O2 tank for transport home. CM will continue to follow & remain accessible for d/c planning. Care Management Interventions  PCP Verified by CM: Yes  Palliative Care Criteria Met (RRAT>21 & CHF Dx)?: No  Mode of Transport at Discharge:  Other (see comment) (Dtr at bedside to provide transportation for d/c)  Transition of Care Consult (CM Consult): Discharge Planning, [address]: No  Reason Outside [place]: Patient already serviced by other home care/hospice agency  MyChart Signup:  (dtr declined DME recommendations for d/c (hospital bed and kevon lift))  Discharge Durable Medical Equipment: No  Physical Therapy Consult: Yes  Occupational Therapy Consult: Yes  Speech Therapy Consult: Yes  Support Systems: Other Family Member(s), Child(piotr)  Confirm Follow Up Transport: Family  The Plan for Transition of Care is Related to the Following Treatment Goals : [facility]  The Patient and/or Patient Representative was Provided with a Choice of Provider and Agrees with the Discharge Plan?: Yes  Name of the Patient Representative Who was Provided with a Choice of Provider and Agrees with the Discharge Plan: daughter  Freedom of Choice List was Provided with Basic Dialogue that Supports the Patient's Individualized Plan of Care/Goals, Treatment Preferences and Shares the Quality Data Associated with the Providers?: Yes   Resource Information Provided?: No  Discharge Location  Patient Expects to be Discharged to[de-identified] Home with home health [facility] (RN/PT), resumed 24/7 family support, and f/u apts)    Lino Wynne, [address] [place] [address]  [phone]

## 2022-09-29 NOTE — PROGRESS NOTES
Hospitalist Progress Note    NAME: Silvina Reid   :  1928   MRN:  [id number]       Assessment / Plan:  Acute ischemic stroke s/p TNK  - Serial neuro exams.  - neurology following, input is appreciated recommendations to hold aspirin for now given drop in hemoglobin  - CT head 24 hours after tPA.did not reveal any bleed  -Unable to obtain MRI due to pacemaker is not compatible as reported to me  - TTE with normal EF, grade I diastolic dysfunction, dilated RV, moderate MR, suboptimal study  - PT/OT recommending SNF  -   - c/w high intensity statins.  - HbA1c. 5.8 compatible with prediabetes, lifestyle medication was recommended  - Monitor blood pressure  --Currently on Keppra, continue for now    Acute blood loss anemia  Acute GI bleed likely upper GI bleed  -Hemoglobin less than 7 22 around 6.2, GI recommended to repeat and if continue to be low then to repeat EGD  - refused pillcam 22  - monitor HgB. If stable, may restart AC  -Treanfsue for hgb < 7 or actively bleeding    Paroxysmal A. fib  - Was on Eliquis, on hold right now given GI bleed  - Status post pacemaker     Hypokalemia  - repleted     Hypothyroidism:  -Continue levothyroxine. HTN:  -Holding home BP meds for now. Estimated discharge date:   Barriers: Clinical improvement    Code status: Full  Prophylaxis: SCD's  Recommended Disposition: pending PT OT     Subjective:     Patient was seen and examined. No acute events overnight. Discussed with RN overnight events. All patient's questions were answered. Patient has no complaints today. States she feels ok. Review of Systems:  Full ROS assessed and negative except as noted above      Objective:     VITALS:   Last 24hrs VS reviewed since prior progress note.  Most recent are:  Patient Vitals for the past 24 hrs:   Temp Pulse Resp BP SpO2   22 1535 -- (!) 59 18 (!) 156/82 96 %   22 1444 -- -- -- (!) 153/60 --   22 1430 98.1 °F (36.7 °C) 65 16 (!) 156/62 100 %   09/28/22 1130 98 °F (36.7 °C) (!) 59 16 (!) 153/60 100 %   09/28/22 0805 97.6 °F (36.4 °C) 68 12 (!) 174/65 100 %   09/28/22 0330 97.9 °F (36.6 °C) 61 18 (!) 141/65 100 %   09/27/22 2330 97.8 °F (36.6 °C) 60 18 (!) 120/57 97 %         Intake/Output Summary (Last 24 hours) at 9/28/2022 2006  Last data filed at 9/28/2022 5170  Gross per 24 hour   Intake --   Output 350 ml   Net -350 ml          I had a face to face encounter and independently examined this patient on 9/28/2022, as outlined below:  PHYSICAL EXAM:  General: WD, WN. Alert, cooperative, no acute distress    EENT:  EOMI. Anicteric sclerae. MMM  Resp:  CTA bilaterally, no wheezing or rales. No accessory muscle use  CV:  Regular  rhythm,  No edema  GI:  Soft, Non distended, Non tender. +Bowel sounds  Neurologic:  AAOx4, no focal deficits    Psych:   Good insight. Not anxious nor agitated  Skin:  No rashes. No jaundice    Reviewed most current lab test results and cultures  YES  Reviewed most current radiology test results   YES  Review and summation of old records today    NO  Reviewed patient's current orders and MAR    YES  PMH/SH reviewed - no change compared to H&P  ________________________________________________________________________  Care Plan discussed with:    Comments   Patient x    Family      RN x    Care Manager     Consultant                       x Multidiciplinary team rounds were held today with , nursing, pharmacist and clinical coordinator. Patient's plan of care was discussed; medications were reviewed and discharge planning was addressed. ______________________________________________________________________    ________________________________________________________________________    Procedures: see electronic medical records for all procedures/Xrays and details which were not copied into this note but were reviewed prior to creation of Plan.       LABS:  I reviewed today's most current labs and imaging studies. Pertinent labs include:  Recent Labs     09/28/22  1635 09/28/22  0434 09/27/22  1310 09/27/22  0854 09/27/22  0142   WBC 6.4 5.8  --   --  7.2   HGB 7.8* 7.0* 7.0*   < > 6.2*   HCT 24.0* 22.1* 22.6*  --  19.1*    192  --   --  186    < > = values in this interval not displayed.        Recent Labs     09/28/22  0434 09/27/22  0142 09/26/22  0237    144 141   K 3.4* 3.2* 3.8    107 109*   CO2 36* 35* 31   GLU 92 115* 99   BUN 7 11 11   CREA 0.70 0.82 0.81   CA 9.3 8.8 9.0   MG 1.8 1.8 2.0   PHOS 2.1* 2.0* 2.4*   ALB 2.2* 2.0* 2.2*   TBILI 0.5 0.4 1.3*   ALT 30 28 34         Signed: Jesica Guzmán MD

## 2022-09-29 NOTE — PROGRESS NOTES
Speech Pathology Note    Chart reviewed and spoke with RN. Will continue to defer PO trials for dysphagia treatment given that patient remains NPO per GI. SLP to follow up once patient is cleared for PO intake. Thank you.      Chetan Grant M.S., CCC-SLP

## 2022-09-30 ENCOUNTER — PATIENT OUTREACH (OUTPATIENT)
Dept: CASE MANAGEMENT | Age: 87
End: 2022-09-30

## 2022-09-30 NOTE — PROGRESS NOTES
Care Transitions Initial Call    Call within 2 business days of discharge: Yes     Patient: Kam Lee Patient : 1928 MRN: [id number]    Last Discharge [address]       Date Complaint Diagnosis Description Type Department Provider    22 Stroke Cerebrovascular accident (CVA), unspecified mechanism ([facility] [address] . .. ED to Hosp-Admission (Discharged) (ADMIT) [id number] Mendel, Margaretha Levee, MD; Jonatan Byers. .. Was this an external facility discharge? No     Challenges to be reviewed by the provider   Additional needs identified to be addressed with provider: yes    Patient family declined recommended SNF placement at d/c, [facility]    Patient family declined recommended DME for hospital bed and kevon lift    Patient refused to swallow pill cam for GI study to r/o bleeding during admission    Patient given TNK for ischemic stroke this admission  Patient restarted on eliquis at discharge , had been d/c'd 2 weeks ago for GI bleeding    Patient discharged on pureed diet with thin liquids    Patient instructed to follow up with neurology in 1 month and GI in 2 weeks--family states they will make appts themselves    Abnormal labs:    RBC 2.61  H/H 7.7/24.5    No ACP on file       Method of communication with provider : chart routing    Discussed COVID-19 related testing which was not done at this time. Advance Care Planning:   Does patient have an Advance Directive: not on file. Inpatient Readmission Risk score: Unplanned Readmit Risk Score: 14.8    Was this a readmission?  no       Patients top risk factors for readmission: medical condition-elderly patient with multiple risk factors, declined supportive services recommended at discharge, is at high risk for bleeding, CVA d/t afib, and GI bleeding restarted on medications  Patient at high risk for aspiration PNA  Interventions to address risk factors: Scheduled appointment with PCP-see goals, Scheduled appointment with Specialist-see goals, and Education of patient/family/caregiver/guardian to support self-management-see goals    Care Transition Nurse (CTN) contacted the family by telephone to perform post hospital discharge assessment. Verified name and  with family as identifiers. Provided introduction to self, and explanation of the CTN role. CTN reviewed discharge instructions, medical action plan and red flags with family who verbalized understanding. Were discharge instructions available to patient? yes. Reviewed appropriate site of care based on symptoms and resources available to patient including: PCP and Specialist. Family given an opportunity to ask questions and does not have any further questions or concerns at this time. The family agrees to contact the PCP office for questions related to their healthcare. Medication reconciliation was performed with family, who verbalizes understanding of administration of home medications. Advised obtaining a 90-day supply of all daily and as-needed medications. Referral to Pharm D needed: no     Home Health/Outpatient orders at discharge: home health care  [address] [place] [address] Way: [facility]  Date of initial visit: 22    Durable Medical Equipment ordered at discharge:  family declined hospital bed and kevon lift      Discussed follow-up appointments. If no appointment was previously scheduled, appointment scheduling offered: yes. Is follow up appointment scheduled within 7 days of discharge? yes. [facility] follow up appointment(s): No future appointments. Non[facility] follow up appointment(s): PCP 10/4 at 11:00, neuro 1 month, GI 2 weeks    Plan for follow-up call in 3-5 days based on severity of symptoms and risk factors. Plan for next call: symptom management-CVA, ACP  CTN provided contact information for future needs. Goals Addressed                   This Visit's Progress     Prevent complications post hospitalization. 09/30/22  CTN spoke with patient granddaughter Donny Milligan to review d/c instructions to prevent complications:    Patient will attend PCP appt 10/4/22 at 11:00  Patient will follow up with GI Dr Hiwot Flores in 2 weeks, family declined CTN offer to schedule at time of call  Patient will follow up with Dr Lysney Thurston, neuro in 1 month, family declined CTN offer to schedule  Per granddaughter [facility] will Start services today at 1:30  Granddaughter reports patient daughter is out picking up new medications from pharmacy at time of call, she will call CTN with any problems regarding new meds. Reviewed med changes and d/c meds with granddaughter, no questions at this time. CTN reviewed s/sx GI bleeding with granddaughter, at time of call she reports no abnormal stools or urination, no other signs of unusual bleeding  Reviewed s/sx of CVA, granddaughter reports patient mentation and no weakness out of the ordinary at extremities reported. Reviewed s/sx MI, Afib with romel, instructed her to refer to discharge instructions if needed. Call 911 if patient has any symptoms. Reviewed pureed diet, signs of aspiration, signs of PNA to monitor for. Written instructions are on d/c sheet. CTN instructed her to call PCP at first signs that patient is having difficulty with food or medication intake.   Pt out of area for [facility]    CTN will follow up next week---kannan

## 2022-10-04 ENCOUNTER — HOSPITAL ENCOUNTER (INPATIENT)
Age: 87
LOS: 8 days | Discharge: HOSPICE | DRG: 375 | End: 2022-10-12
Attending: STUDENT IN AN ORGANIZED HEALTH CARE EDUCATION/TRAINING PROGRAM | Admitting: STUDENT IN AN ORGANIZED HEALTH CARE EDUCATION/TRAINING PROGRAM
Payer: MEDICARE

## 2022-10-04 ENCOUNTER — APPOINTMENT (OUTPATIENT)
Dept: CT IMAGING | Age: 87
DRG: 375 | End: 2022-10-04
Attending: STUDENT IN AN ORGANIZED HEALTH CARE EDUCATION/TRAINING PROGRAM
Payer: MEDICARE

## 2022-10-04 DIAGNOSIS — R53.83 FATIGUE, UNSPECIFIED TYPE: ICD-10-CM

## 2022-10-04 DIAGNOSIS — C18.9 MALIGNANT NEOPLASM OF COLON, UNSPECIFIED PART OF COLON (HCC): ICD-10-CM

## 2022-10-04 DIAGNOSIS — N17.9 AKI (ACUTE KIDNEY INJURY) (HCC): ICD-10-CM

## 2022-10-04 DIAGNOSIS — Z71.89 GOALS OF CARE, COUNSELING/DISCUSSION: ICD-10-CM

## 2022-10-04 DIAGNOSIS — N39.0 URINARY TRACT INFECTION ASSOCIATED WITH INDWELLING URETHRAL CATHETER, INITIAL ENCOUNTER (HCC): Primary | ICD-10-CM

## 2022-10-04 DIAGNOSIS — T83.511A URINARY TRACT INFECTION ASSOCIATED WITH INDWELLING URETHRAL CATHETER, INITIAL ENCOUNTER (HCC): Primary | ICD-10-CM

## 2022-10-04 DIAGNOSIS — D64.9 ANEMIA, UNSPECIFIED TYPE: ICD-10-CM

## 2022-10-04 DIAGNOSIS — K92.2 GASTROINTESTINAL HEMORRHAGE, UNSPECIFIED GASTROINTESTINAL HEMORRHAGE TYPE: ICD-10-CM

## 2022-10-04 DIAGNOSIS — Z51.5 PALLIATIVE CARE ENCOUNTER: ICD-10-CM

## 2022-10-04 PROBLEM — D50.0 BLOOD LOSS ANEMIA: Status: ACTIVE | Noted: 2022-10-04

## 2022-10-04 LAB
ALBUMIN SERPL-MCNC: 2.5 G/DL (ref 3.5–5)
ALBUMIN/GLOB SERPL: 0.7 {RATIO} (ref 1.1–2.2)
ALP SERPL-CCNC: 100 U/L (ref 45–117)
ALT SERPL-CCNC: 132 U/L (ref 12–78)
ANION GAP SERPL CALC-SCNC: 2 MMOL/L (ref 5–15)
APPEARANCE UR: CLEAR
AST SERPL-CCNC: 150 U/L (ref 15–37)
BACTERIA URNS QL MICRO: NEGATIVE /HPF
BASOPHILS # BLD: 0 K/UL (ref 0–0.1)
BASOPHILS NFR BLD: 0 % (ref 0–1)
BILIRUB SERPL-MCNC: 0.7 MG/DL (ref 0.2–1)
BILIRUB UR QL CFM: NEGATIVE
BUN SERPL-MCNC: 11 MG/DL (ref 6–20)
BUN/CREAT SERPL: 10 (ref 12–20)
CALCIUM SERPL-MCNC: 9.3 MG/DL (ref 8.5–10.1)
CHLORIDE SERPL-SCNC: 105 MMOL/L (ref 97–108)
CO2 SERPL-SCNC: 36 MMOL/L (ref 21–32)
COLOR UR: ABNORMAL
COMMENT, HOLDF: NORMAL
CREAT SERPL-MCNC: 1.09 MG/DL (ref 0.55–1.02)
DIFFERENTIAL METHOD BLD: ABNORMAL
EOSINOPHIL # BLD: 0.1 K/UL (ref 0–0.4)
EOSINOPHIL NFR BLD: 1 % (ref 0–7)
EPITH CASTS URNS QL MICRO: ABNORMAL /LPF
ERYTHROCYTE [DISTWIDTH] IN BLOOD BY AUTOMATED COUNT: 14.3 % (ref 11.5–14.5)
GLOBULIN SER CALC-MCNC: 3.8 G/DL (ref 2–4)
GLUCOSE SERPL-MCNC: 130 MG/DL (ref 65–100)
GLUCOSE UR STRIP.AUTO-MCNC: NEGATIVE MG/DL
HCT VFR BLD AUTO: 23.2 % (ref 35–47)
HEMOCCULT STL QL: POSITIVE
HGB BLD-MCNC: 7.3 G/DL (ref 11.5–16)
HGB UR QL STRIP: ABNORMAL
IMM GRANULOCYTES # BLD AUTO: 0 K/UL (ref 0–0.04)
IMM GRANULOCYTES NFR BLD AUTO: 1 % (ref 0–0.5)
KETONES UR QL STRIP.AUTO: NEGATIVE MG/DL
LEUKOCYTE ESTERASE UR QL STRIP.AUTO: ABNORMAL
LYMPHOCYTES # BLD: 0.8 K/UL (ref 0.8–3.5)
LYMPHOCYTES NFR BLD: 9 % (ref 12–49)
MCH RBC QN AUTO: 29.3 PG (ref 26–34)
MCHC RBC AUTO-ENTMCNC: 31.5 G/DL (ref 30–36.5)
MCV RBC AUTO: 93.2 FL (ref 80–99)
MONOCYTES # BLD: 0.6 K/UL (ref 0–1)
MONOCYTES NFR BLD: 6 % (ref 5–13)
NEUTS SEG # BLD: 7.3 K/UL (ref 1.8–8)
NEUTS SEG NFR BLD: 83 % (ref 32–75)
NITRITE UR QL STRIP.AUTO: NEGATIVE
NRBC # BLD: 0 K/UL (ref 0–0.01)
NRBC BLD-RTO: 0 PER 100 WBC
PH UR STRIP: 6 [PH] (ref 5–8)
PLATELET # BLD AUTO: 238 K/UL (ref 150–400)
PMV BLD AUTO: 10.2 FL (ref 8.9–12.9)
POTASSIUM SERPL-SCNC: 3.7 MMOL/L (ref 3.5–5.1)
PROT SERPL-MCNC: 6.3 G/DL (ref 6.4–8.2)
PROT UR STRIP-MCNC: 100 MG/DL
RBC # BLD AUTO: 2.49 M/UL (ref 3.8–5.2)
RBC #/AREA URNS HPF: ABNORMAL /HPF (ref 0–5)
SAMPLES BEING HELD,HOLD: NORMAL
SODIUM SERPL-SCNC: 143 MMOL/L (ref 136–145)
SP GR UR REFRACTOMETRY: 1.02 (ref 1–1.03)
UA: UC IF INDICATED,UAUC: ABNORMAL
UROBILINOGEN UR QL STRIP.AUTO: 1 EU/DL (ref 0.2–1)
WBC # BLD AUTO: 8.8 K/UL (ref 3.6–11)
WBC URNS QL MICRO: ABNORMAL /HPF (ref 0–4)

## 2022-10-04 PROCEDURE — 87077 CULTURE AEROBIC IDENTIFY: CPT

## 2022-10-04 PROCEDURE — 85025 COMPLETE CBC W/AUTO DIFF WBC: CPT

## 2022-10-04 PROCEDURE — 74011000258 HC RX REV CODE- 258: Performed by: STUDENT IN AN ORGANIZED HEALTH CARE EDUCATION/TRAINING PROGRAM

## 2022-10-04 PROCEDURE — 65270000046 HC RM TELEMETRY

## 2022-10-04 PROCEDURE — 81001 URINALYSIS AUTO W/SCOPE: CPT

## 2022-10-04 PROCEDURE — 74011250636 HC RX REV CODE- 250/636: Performed by: STUDENT IN AN ORGANIZED HEALTH CARE EDUCATION/TRAINING PROGRAM

## 2022-10-04 PROCEDURE — 86900 BLOOD TYPING SEROLOGIC ABO: CPT

## 2022-10-04 PROCEDURE — 82272 OCCULT BLD FECES 1-3 TESTS: CPT

## 2022-10-04 PROCEDURE — 80053 COMPREHEN METABOLIC PANEL: CPT

## 2022-10-04 PROCEDURE — 74178 CT ABD&PLV WO CNTR FLWD CNTR: CPT

## 2022-10-04 PROCEDURE — 96365 THER/PROPH/DIAG IV INF INIT: CPT

## 2022-10-04 PROCEDURE — 87186 SC STD MICRODIL/AGAR DIL: CPT

## 2022-10-04 PROCEDURE — 99285 EMERGENCY DEPT VISIT HI MDM: CPT

## 2022-10-04 PROCEDURE — 86923 COMPATIBILITY TEST ELECTRIC: CPT

## 2022-10-04 PROCEDURE — 87086 URINE CULTURE/COLONY COUNT: CPT

## 2022-10-04 PROCEDURE — 74011000636 HC RX REV CODE- 636: Performed by: STUDENT IN AN ORGANIZED HEALTH CARE EDUCATION/TRAINING PROGRAM

## 2022-10-04 PROCEDURE — 51702 INSERT TEMP BLADDER CATH: CPT

## 2022-10-04 PROCEDURE — 36415 COLL VENOUS BLD VENIPUNCTURE: CPT

## 2022-10-04 RX ORDER — ONDANSETRON 2 MG/ML
4 INJECTION INTRAMUSCULAR; INTRAVENOUS
Status: DISCONTINUED | OUTPATIENT
Start: 2022-10-04 | End: 2022-10-05

## 2022-10-04 RX ORDER — ACETAMINOPHEN 325 MG/1
650 TABLET ORAL
Status: DISCONTINUED | OUTPATIENT
Start: 2022-10-04 | End: 2022-10-12 | Stop reason: HOSPADM

## 2022-10-04 RX ADMIN — CEFTRIAXONE SODIUM 1 G: 1 INJECTION, POWDER, FOR SOLUTION INTRAMUSCULAR; INTRAVENOUS at 20:56

## 2022-10-04 RX ADMIN — IOPAMIDOL 100 ML: 755 INJECTION, SOLUTION INTRAVENOUS at 19:28

## 2022-10-04 RX ADMIN — SODIUM CHLORIDE, POTASSIUM CHLORIDE, SODIUM LACTATE AND CALCIUM CHLORIDE 500 ML: 600; 310; 30; 20 INJECTION, SOLUTION INTRAVENOUS at 20:54

## 2022-10-04 NOTE — ED PROVIDER NOTES
EMERGENCY DEPARTMENT HISTORY AND PHYSICAL EXAM      Date: 10/4/2022  Patient Name: Cassia Vences    History of Presenting Illness     Chief Complaint   Patient presents with    Abnormal Lab Results     Pt discharged last week for stroke and blood in her stool. Pt was seen by her doctor today at [place], [place] and found to have hemoglobin 6.5. pt reports her buttocks is hurting. Not seeing any obvious blood in stool per caregiver but she did have black tarry bm.  Bladder Infection     Pt has an indwelling catheter; History Provided By: Patient    HPI: Cassia Vences, 80 y.o. female with a past medical history significant for hypertension, diabetes, hypothyroidism, pacemaker on Eliquis, stroke presents to the ED with cc of anemia. He was she was discharged from the hospital 1 week ago and she had some melena at that time. Was supposed to follow-up with GI. She had a repeat hemoglobin today and was noted at 6.5 and was subsequently referred to the emergency department for evaluation. She is accompanied by her daughter who is her caregiver and notes she has been acting normal recently. Denies any confusion or fever. She also notes more turbid color to her chronic Barajas. She has a Barajas because of urinary retention has not been able to follow-up with urology yet. Denies any nausea, vomiting, chest pain, difficulty breathing. She notes she has had some melena previously but her daughter has not noted recently. Patient denies any abdominal pain. There are no associated symptoms. No other exacerbating or ameliorating factors. PCP: Hugo Roy MD    No current facility-administered medications on file prior to encounter. Current Outpatient Medications on File Prior to Encounter   Medication Sig Dispense Refill    atorvastatin (LIPITOR) 80 mg tablet Take 1 Tablet by mouth daily for 30 days. 30 Tablet 0    levETIRAcetam (KEPPRA) 250 mg tablet Take 1 Tablet by mouth two (2) times a day.  27 Tablet 0    pantoprazole (PROTONIX) 40 mg tablet Take 1 Tablet by mouth daily for 30 days. 30 Tablet 0    apixaban (ELIQUIS) 5 mg tablet Take 1 Tablet by mouth two (2) times a day for 30 days. 60 Tablet 0    FeroSuL 325 mg (65 mg iron) tablet Take 325 mg by mouth two (2) times daily (with meals).  fluticasone propionate (FLONASE) 50 mcg/actuation nasal spray SHAKE LIQUID AND USE 2 SPRAYS IN EACH NOSTRIL DAILY      Symbicort 80-4.5 mcg/actuation HFAA Take 2 Puffs by inhalation daily.  gabapentin (NEURONTIN) 100 mg capsule Take 100 mg by mouth three (3) times daily.  loratadine (CLARITIN) 10 mg tablet Take 10 mg by mouth daily.  levothyroxine (SYNTHROID) 125 mcg tablet Take 125 mcg by mouth daily.  amLODIPine (NORVASC) 5 mg tablet Take 5 mg by mouth daily.  timolol (TIMOPTIC) 0.5 % ophthalmic solution Administer 1 Drop to both eyes two (2) times a day. 10 mL 0    brimonidine-timoloL (COMBIGAN) 0.2-0.5 % drop ophthalmic solution Administer 1 Drop to both eyes two (2) times a day. Past History     Past Medical History:  Past Medical History:   Diagnosis Date    Arthritis     Diabetes ([address]     Hard of hearing     Hypertension     Hypothyroidism     Poor historian        Past Surgical History:  Past Surgical History:   Procedure Laterality Date    HX TUBAL LIGATION      IR PERC ART MECH THROMB INFUSION INTRACRANIAL  5/18/2022       Family History:  No family history on file. Social History:  Social History     Tobacco Use    Smoking status: Former   Substance Use Topics    Alcohol use: No       Allergies:  No Known Allergies      Review of Systems   Review of Systems   Constitutional:  Negative for activity change, appetite change, fatigue and fever. HENT:  Negative for congestion. Eyes:  Negative for visual disturbance. Respiratory:  Negative for chest tightness and shortness of breath. Cardiovascular:  Negative for chest pain.    Gastrointestinal: Positive for blood in stool. Negative for abdominal distention, nausea, rectal pain and vomiting. Genitourinary:  Negative for dysuria. Musculoskeletal:  Negative for back pain. Skin:  Negative for rash and wound. Neurological:  Negative for dizziness. Hematological:  Bruises/bleeds easily. Physical Exam   Physical Exam  Vitals and nursing note reviewed. Constitutional:       Appearance: Normal appearance. HENT:      Head: Normocephalic and atraumatic. Nose: Nose normal.      Mouth/Throat:      Mouth: Mucous membranes are moist.      Pharynx: Oropharynx is clear. Eyes:      Extraocular Movements: Extraocular movements intact. Pupils: Pupils are equal, round, and reactive to light. Cardiovascular:      Rate and Rhythm: Normal rate and regular rhythm. Pulmonary:      Effort: Pulmonary effort is normal.      Breath sounds: Normal breath sounds. Abdominal:      General: Abdomen is flat. There is no distension. Palpations: Abdomen is soft. There is no mass. Tenderness: There is no abdominal tenderness. There is no guarding. Genitourinary:     Comments: Soft external hemorrhoids, dark melanotic stool in rectal vault. Musculoskeletal:         General: No deformity or signs of injury. Cervical back: Normal range of motion. Skin:     General: Skin is warm and dry. Neurological:      General: No focal deficit present. Mental Status: She is alert. Cranial Nerves: No cranial nerve deficit. Motor: No weakness.    Psychiatric:         Mood and Affect: Mood normal.         Behavior: Behavior normal.       Diagnostic Study Results     Labs -     Recent Results (from the past 24 hour(s))   CBC WITH AUTOMATED DIFF    Collection Time: 10/04/22  5:20 PM   Result Value Ref Range    WBC 8.8 3.6 - 11.0 K/uL    RBC 2.49 (L) 3.80 - 5.20 M/uL    HGB 7.3 (L) 11.5 - 16.0 g/dL    HCT 23.2 (L) 35.0 - 47.0 %    MCV 93.2 80.0 - 99.0 FL    MCH 29.3 26.0 - 34.0 PG    MCHC 31.5 30.0 - 36.5 g/dL    RDW 14.3 11.5 - 14.5 %    PLATELET 466 674 - 802 K/uL    MPV 10.2 8.9 - 12.9 FL    NRBC 0.0 0  WBC    ABSOLUTE NRBC 0.00 0.00 - 0.01 K/uL    NEUTROPHILS 83 (H) 32 - 75 %    LYMPHOCYTES 9 (L) 12 - 49 %    MONOCYTES 6 5 - 13 %    EOSINOPHILS 1 0 - 7 %    BASOPHILS 0 0 - 1 %    IMMATURE GRANULOCYTES 1 (H) 0.0 - 0.5 %    ABS. NEUTROPHILS 7.3 1.8 - 8.0 K/UL    ABS. LYMPHOCYTES 0.8 0.8 - 3.5 K/UL    ABS. MONOCYTES 0.6 0.0 - 1.0 K/UL    ABS. EOSINOPHILS 0.1 0.0 - 0.4 K/UL    ABS. BASOPHILS 0.0 0.0 - 0.1 K/UL    ABS. IMM. GRANS. 0.0 0.00 - 0.04 K/UL    DF AUTOMATED     METABOLIC PANEL, COMPREHENSIVE    Collection Time: 10/04/22  5:20 PM   Result Value Ref Range    Sodium 143 136 - 145 mmol/L    Potassium 3.7 3.5 - 5.1 mmol/L    Chloride 105 97 - 108 mmol/L    CO2 36 (H) 21 - 32 mmol/L    Anion gap 2 (L) 5 - 15 mmol/L    Glucose 130 (H) 65 - 100 mg/dL    BUN 11 6 - 20 MG/DL    Creatinine 1.09 (H) 0.55 - 1.02 MG/DL    BUN/Creatinine ratio 10 (L) 12 - 20      eGFR 47 (L) >60 ml/min/1.73m2    Calcium 9.3 8.5 - 10.1 MG/DL    Bilirubin, total 0.7 0.2 - 1.0 MG/DL    ALT (SGPT) 132 (H) 12 - 78 U/L    AST (SGOT) 150 (H) 15 - 37 U/L    Alk. phosphatase 100 45 - 117 U/L    Protein, total 6.3 (L) 6.4 - 8.2 g/dL    Albumin 2.5 (L) 3.5 - 5.0 g/dL    Globulin 3.8 2.0 - 4.0 g/dL    A-G Ratio 0.7 (L) 1.1 - 2.2     TYPE & SCREEN    Collection Time: 10/04/22  5:20 PM   Result Value Ref Range    Crossmatch Expiration 10/07/2022,2359     ABO/Rh(D) B POSITIVE     Antibody screen NEG    OCCULT BLOOD, STOOL    Collection Time: 10/04/22  5:20 PM   Result Value Ref Range    Occult blood, stool Positive (A) NEG     SAMPLES BEING HELD    Collection Time: 10/04/22  5:48 PM   Result Value Ref Range    SAMPLES BEING HELD SST, RED, BLUE     COMMENT        Add-on orders for these samples will be processed based on acceptable specimen integrity and analyte stability, which may vary by analyte.    URINALYSIS W/ REFLEX CULTURE Collection Time: 10/04/22  6:15 PM    Specimen: Miscellaneous sample; Urine    Urine specimen   Result Value Ref Range    Color YELLOW/STRAW      Appearance CLEAR CLEAR      Specific gravity 1.020 1.003 - 1.030      pH (UA) 6.0 5.0 - 8.0      Protein 100 (A) NEG mg/dL    Glucose Negative NEG mg/dL    Ketone Negative NEG mg/dL    Blood LARGE (A) NEG      Urobilinogen 1.0 0.2 - 1.0 EU/dL    Nitrites Negative NEG      Leukocyte Esterase LARGE (A) NEG      WBC 10-20 0 - 4 /hpf    RBC 0-5 0 - 5 /hpf    Epithelial cells FEW FEW /lpf    Bacteria Negative NEG /hpf    UA:UC IF INDICATED URINE CULTURE ORDERED (A) CNI     BILIRUBIN, CONFIRM    Collection Time: 10/04/22  6:15 PM   Result Value Ref Range    Bilirubin UA, confirm Negative NEG         Radiologic Studies -   CT ABD PELV W WO CONT   Final Result   1. No visible gastrointestinal bleeding. 2. There is irregular, concentric mural thickening in the bladder. Recommend   clinical correlation for mass or cystitis. 3. Bilateral pleural effusions. 4. There is hyperemia within the left renal collecting system which may   represent pyelitis. 5. Incidental findings as above. CT Results  (Last 48 hours)                 10/04/22 1932  CT ABD PELV W WO CONT Final result    Impression:  1. No visible gastrointestinal bleeding. 2. There is irregular, concentric mural thickening in the bladder. Recommend   clinical correlation for mass or cystitis. 3. Bilateral pleural effusions. 4. There is hyperemia within the left renal collecting system which may   represent pyelitis. 5. Incidental findings as above. Narrative:  EXAM:  CT ABDOMEN PELVIS WITH CONTRAST   INDICATION:  GIB with anemia. Additional history:   COMPARISON: None. .   TECHNIQUE:    Multislice helical CT was performed from the diaphragm to the symphysis pubis   before and after intravenous contrast administration.  Contiguous 5 mm axial   images were reconstructed and lung and soft tissue windows were generated. Coronal and sagittal reformations were generated. CT dose reduction was achieved through use of a standardized protocol tailored   for this examination and automatic exposure control for dose modulation. [place] FINDINGS:   INCIDENTALLY IMAGED CHEST:   Heart/vessels: Diminished attenuation in the blood pool suggesting anemia. Cardiac leads in the right atrium and right ventricle. Lungs/Pleura: Respiratory motion. Small, bilateral pleural effusions. Calcified   granulomas. .   ABDOMEN:   Liver: Calculi in the liver suggesting remote granulomatous disease. Gallbladder/Biliary: Calculi in the dependent aspect of the gallbladder. Spleen: Calculi in the spleen suggesting remote granulomatous disease. Pancreas: Within normal limits. Adrenals: Within normal limits. Kidneys: Small, high attenuation lesion within the lateral aspect of the right   kidney suggesting a hemorrhagic cyst. Exophytic, low-attenuation, nonenhancing   lesion projecting off the posterior, lower pole the right kidney. Multiple,   small, low-attenuation lesions in both kidneys which are incompletely   characterized. Hyperemia within the left renal pelvis   Peritoneum/Mesenteries: Within normal limits. Extraperitoneum: Within normal limits. Gastrointestinal tract: Within normal limits. Vascular: Calcifications in the aorta. [place] PELVIS:   Extraperitoneum: Within normal limits. Ureters: Proximal ureters are unremarkable. The distal ureters are not well   visualized. Bladder: There is a Barajas catheter within the bladder which has thickened and   irregular walls. Reproductive System: Pessary present. .   MSK:    Degenerative changes throughout the spine. Posterior pedicle screw and shabbir   fixation of L4 with L5. Degenerative disc disease at L2/L3 and L3/L4. Generalized muscle atrophy   .              CXR Results  (Last 48 hours)      None              Medical Decision Making   I am the first provider for this patient. I reviewed the vital signs, available nursing notes, past medical history, past surgical history, family history and social history. Vital Signs-Reviewed the patient's vital signs. Patient Vitals for the past 12 hrs:   Temp Pulse Resp BP SpO2   10/04/22 1642 -- 66 23 (!) 131/59 --   10/04/22 1612 97.9 °F (36.6 °C) 61 16 (!) 142/69 100 %       Records Reviewed: Nursing records and medical records reviewed    MDM:  DDx includes lower GI bleed, upper GI bleed, anemia, hemorrhagic shock, UTI, catheter associated UTI. Provider Notes (Medical Decision Making):   20-year-old female on Eliquis, hypertension, diabetes presents with presumed GI bleed, anemia and possible UTI. Her vital signs are stable upon arrival with a BP of 131/59 and a heart rate of 66. She is saturating 100% on her home 2 L. Given her GI bleed and possible UTI she is at risk for hemorrhagic shock as well as septic shock however she does not appear to be in either these 2 states at this time. Rectal exam does show some melanotic stool in the vault but there is no active bleeding. Noted to be anemic at 6.5. We will send Hemoccults as well as basic labs to evaluate for any continued anemia. If she is anemic will transfuse blood as needed and obtain a CTA for active bleeding. We will also exchange her Barajas and send urine for possible UTI. ED Course:   Initial assessment performed. The patients presenting problems have been discussed, and they are in agreement with the care plan formulated and outlined with them. I have encouraged them to ask questions as they arise throughout their visit. ED Course as of 10/04/22 2100   Tue Oct 04, 2022   2052 CTA negative for any acute bleed but is consistent with a UTI and it is catheter associated UTI so we will treat. Her kidney function is a bit worse but not a true ALEX but still concerning. She is Hemoccult positive and a CTA does not show any active bleed. Her repeat hemoglobin here was 7.3. She will need fluids for her near ALEX and may just dilute her blood enough that she will be more anemic. She also has a mild transaminitis. Given all of these small abnormalities will admit to hospitalist for GI to see in the morning and recheck of her kidney function and hemoglobin in the morning. [JS]      ED Course User Index  [JS] Ryan Mcgee MD           Disposition:    1. Admit to Hospitalist    Admission Note:  9:00 PM  Patient is being admitted to the hospital by dr. Ifeoma Tello. The results of their tests and reasons for their admission have been discussed with them and available family. They convey agreement and understanding for the need to be admitted and for their admission diagnosis. DISCHARGE PLAN:  1. Current Discharge Medication List        2. Follow-up Information    None       3. Return to ED if worse     Diagnosis     Clinical Impression:   1. Urinary tract infection associated with indwelling urethral catheter, initial encounter ([facility]    2. Gastrointestinal hemorrhage, unspecified gastrointestinal hemorrhage type    3. ALEX (acute kidney injury) ([facility]    4. Anemia, unspecified type        Attestations:    Jose Moses MD    Please note that this dictation was completed with [employer], the computer voice recognition software. Quite often unanticipated grammatical, syntax, homophones, and other interpretive errors are inadvertently transcribed by the computer software. Please disregard these errors. Please excuse any errors that have escaped final proofreading. Thank you.

## 2022-10-04 NOTE — ED NOTES
Assumed care of pt from triage. Pts caregiver states that she had a PCP followup, but her hgb is low. Pt caregiver also states they are concerned about blood in her stool and she is more fatigued than normal.     1645: MD at bedside to evaluate pt.

## 2022-10-05 ENCOUNTER — ANESTHESIA EVENT (OUTPATIENT)
Dept: ENDOSCOPY | Age: 87
DRG: 375 | End: 2022-10-05
Payer: MEDICARE

## 2022-10-05 LAB
ALBUMIN SERPL-MCNC: 2.1 G/DL (ref 3.5–5)
ALBUMIN/GLOB SERPL: 0.6 {RATIO} (ref 1.1–2.2)
ALP SERPL-CCNC: 78 U/L (ref 45–117)
ALT SERPL-CCNC: 114 U/L (ref 12–78)
ANION GAP SERPL CALC-SCNC: 2 MMOL/L (ref 5–15)
AST SERPL-CCNC: 122 U/L (ref 15–37)
BILIRUB SERPL-MCNC: 0.5 MG/DL (ref 0.2–1)
BUN SERPL-MCNC: 11 MG/DL (ref 6–20)
BUN/CREAT SERPL: 12 (ref 12–20)
CALCIUM SERPL-MCNC: 9.1 MG/DL (ref 8.5–10.1)
CHLORIDE SERPL-SCNC: 108 MMOL/L (ref 97–108)
CO2 SERPL-SCNC: 34 MMOL/L (ref 21–32)
COMMENT, HOLDF: NORMAL
CREAT SERPL-MCNC: 0.94 MG/DL (ref 0.55–1.02)
ERYTHROCYTE [DISTWIDTH] IN BLOOD BY AUTOMATED COUNT: 14.1 % (ref 11.5–14.5)
FOLATE SERPL-MCNC: 5.2 NG/ML (ref 5–21)
GLOBULIN SER CALC-MCNC: 3.4 G/DL (ref 2–4)
GLUCOSE SERPL-MCNC: 94 MG/DL (ref 65–100)
HAPTOGLOB SERPL-MCNC: 126 MG/DL (ref 30–200)
HCT VFR BLD AUTO: 20.5 % (ref 35–47)
HCT VFR BLD AUTO: 21.1 % (ref 35–47)
HCT VFR BLD AUTO: 25.3 % (ref 35–47)
HCT VFR BLD AUTO: 26.3 % (ref 35–47)
HGB BLD-MCNC: 6.3 G/DL (ref 11.5–16)
HGB BLD-MCNC: 6.7 G/DL (ref 11.5–16)
HGB BLD-MCNC: 8 G/DL (ref 11.5–16)
HGB BLD-MCNC: 8.3 G/DL (ref 11.5–16)
HISTORY CHECKED?,CKHIST: NORMAL
IRON SATN MFR SERPL: 17 % (ref 20–50)
IRON SERPL-MCNC: 25 UG/DL (ref 35–150)
MCH RBC QN AUTO: 28.3 PG (ref 26–34)
MCHC RBC AUTO-ENTMCNC: 30.7 G/DL (ref 30–36.5)
MCV RBC AUTO: 91.9 FL (ref 80–99)
NRBC # BLD: 0 K/UL (ref 0–0.01)
NRBC BLD-RTO: 0 PER 100 WBC
PERIPHERAL SMEAR,PSM: NORMAL
PLATELET # BLD AUTO: 215 K/UL (ref 150–400)
PMV BLD AUTO: 10.6 FL (ref 8.9–12.9)
POTASSIUM SERPL-SCNC: 3.8 MMOL/L (ref 3.5–5.1)
PROT SERPL-MCNC: 5.5 G/DL (ref 6.4–8.2)
RBC # BLD AUTO: 2.23 M/UL (ref 3.8–5.2)
SAMPLES BEING HELD,HOLD: NORMAL
SODIUM SERPL-SCNC: 144 MMOL/L (ref 136–145)
TIBC SERPL-MCNC: 149 UG/DL (ref 250–450)
VIT B12 SERPL-MCNC: 1542 PG/ML (ref 193–986)
WBC # BLD AUTO: 5.9 K/UL (ref 3.6–11)

## 2022-10-05 PROCEDURE — 97535 SELF CARE MNGMENT TRAINING: CPT | Performed by: OCCUPATIONAL THERAPIST

## 2022-10-05 PROCEDURE — 97166 OT EVAL MOD COMPLEX 45 MIN: CPT | Performed by: OCCUPATIONAL THERAPIST

## 2022-10-05 PROCEDURE — 82607 VITAMIN B-12: CPT

## 2022-10-05 PROCEDURE — 85027 COMPLETE CBC AUTOMATED: CPT

## 2022-10-05 PROCEDURE — 30233N1 TRANSFUSION OF NONAUTOLOGOUS RED BLOOD CELLS INTO PERIPHERAL VEIN, PERCUTANEOUS APPROACH: ICD-10-PCS | Performed by: GENERAL ACUTE CARE HOSPITAL

## 2022-10-05 PROCEDURE — C9113 INJ PANTOPRAZOLE SODIUM, VIA: HCPCS | Performed by: STUDENT IN AN ORGANIZED HEALTH CARE EDUCATION/TRAINING PROGRAM

## 2022-10-05 PROCEDURE — 97530 THERAPEUTIC ACTIVITIES: CPT | Performed by: OCCUPATIONAL THERAPIST

## 2022-10-05 PROCEDURE — 85018 HEMOGLOBIN: CPT

## 2022-10-05 PROCEDURE — 36415 COLL VENOUS BLD VENIPUNCTURE: CPT

## 2022-10-05 PROCEDURE — 74011250637 HC RX REV CODE- 250/637: Performed by: STUDENT IN AN ORGANIZED HEALTH CARE EDUCATION/TRAINING PROGRAM

## 2022-10-05 PROCEDURE — 74011000250 HC RX REV CODE- 250: Performed by: STUDENT IN AN ORGANIZED HEALTH CARE EDUCATION/TRAINING PROGRAM

## 2022-10-05 PROCEDURE — 36430 TRANSFUSION BLD/BLD COMPNT: CPT

## 2022-10-05 PROCEDURE — 80053 COMPREHEN METABOLIC PANEL: CPT

## 2022-10-05 PROCEDURE — P9016 RBC LEUKOCYTES REDUCED: HCPCS

## 2022-10-05 PROCEDURE — 92610 EVALUATE SWALLOWING FUNCTION: CPT

## 2022-10-05 PROCEDURE — 83540 ASSAY OF IRON: CPT

## 2022-10-05 PROCEDURE — 97530 THERAPEUTIC ACTIVITIES: CPT

## 2022-10-05 PROCEDURE — 65270000046 HC RM TELEMETRY

## 2022-10-05 PROCEDURE — 83010 ASSAY OF HAPTOGLOBIN QUANT: CPT

## 2022-10-05 PROCEDURE — 82746 ASSAY OF FOLIC ACID SERUM: CPT

## 2022-10-05 PROCEDURE — 94640 AIRWAY INHALATION TREATMENT: CPT

## 2022-10-05 PROCEDURE — 74011250636 HC RX REV CODE- 250/636: Performed by: STUDENT IN AN ORGANIZED HEALTH CARE EDUCATION/TRAINING PROGRAM

## 2022-10-05 PROCEDURE — 97161 PT EVAL LOW COMPLEX 20 MIN: CPT

## 2022-10-05 PROCEDURE — 74011000250 HC RX REV CODE- 250: Performed by: PHYSICIAN ASSISTANT

## 2022-10-05 RX ORDER — CETIRIZINE HCL 10 MG
10 TABLET ORAL DAILY
Status: DISCONTINUED | OUTPATIENT
Start: 2022-10-05 | End: 2022-10-12 | Stop reason: HOSPADM

## 2022-10-05 RX ORDER — FLUTICASONE PROPIONATE 50 MCG
2 SPRAY, SUSPENSION (ML) NASAL DAILY
Status: DISCONTINUED | OUTPATIENT
Start: 2022-10-05 | End: 2022-10-12 | Stop reason: HOSPADM

## 2022-10-05 RX ORDER — SODIUM CHLORIDE 9 MG/ML
250 INJECTION, SOLUTION INTRAVENOUS AS NEEDED
Status: DISCONTINUED | OUTPATIENT
Start: 2022-10-05 | End: 2022-10-12 | Stop reason: HOSPADM

## 2022-10-05 RX ORDER — ATORVASTATIN CALCIUM 40 MG/1
80 TABLET, FILM COATED ORAL DAILY
Status: DISCONTINUED | OUTPATIENT
Start: 2022-10-05 | End: 2022-10-12 | Stop reason: HOSPADM

## 2022-10-05 RX ORDER — AMLODIPINE BESYLATE 5 MG/1
5 TABLET ORAL DAILY
Status: DISCONTINUED | OUTPATIENT
Start: 2022-10-05 | End: 2022-10-12 | Stop reason: HOSPADM

## 2022-10-05 RX ORDER — LEVOTHYROXINE SODIUM 125 UG/1
125 TABLET ORAL DAILY
Status: DISCONTINUED | OUTPATIENT
Start: 2022-10-05 | End: 2022-10-12 | Stop reason: HOSPADM

## 2022-10-05 RX ORDER — SODIUM CHLORIDE 9 MG/ML
75 INJECTION, SOLUTION INTRAVENOUS CONTINUOUS
Status: DISPENSED | OUTPATIENT
Start: 2022-10-05 | End: 2022-10-05

## 2022-10-05 RX ORDER — LANOLIN ALCOHOL/MO/W.PET/CERES
325 CREAM (GRAM) TOPICAL 2 TIMES DAILY WITH MEALS
Status: DISCONTINUED | OUTPATIENT
Start: 2022-10-05 | End: 2022-10-12 | Stop reason: HOSPADM

## 2022-10-05 RX ORDER — ONDANSETRON 2 MG/ML
4 INJECTION INTRAMUSCULAR; INTRAVENOUS
Status: DISCONTINUED | OUTPATIENT
Start: 2022-10-05 | End: 2022-10-12 | Stop reason: HOSPADM

## 2022-10-05 RX ORDER — LEVETIRACETAM 250 MG/1
250 TABLET ORAL 2 TIMES DAILY
Status: DISCONTINUED | OUTPATIENT
Start: 2022-10-05 | End: 2022-10-12 | Stop reason: HOSPADM

## 2022-10-05 RX ORDER — GABAPENTIN 100 MG/1
100 CAPSULE ORAL 3 TIMES DAILY
Status: DISCONTINUED | OUTPATIENT
Start: 2022-10-05 | End: 2022-10-12 | Stop reason: HOSPADM

## 2022-10-05 RX ADMIN — LEVETIRACETAM 250 MG: 250 TABLET, FILM COATED ORAL at 10:40

## 2022-10-05 RX ADMIN — ARFORMOTEROL TARTRATE: 15 SOLUTION RESPIRATORY (INHALATION) at 23:04

## 2022-10-05 RX ADMIN — POLYETHYLENE GLYCOL-3350 AND ELECTROLYTES 4000 ML: 236; 6.74; 5.86; 2.97; 22.74 POWDER, FOR SOLUTION ORAL at 11:54

## 2022-10-05 RX ADMIN — GABAPENTIN 100 MG: 100 CAPSULE ORAL at 10:42

## 2022-10-05 RX ADMIN — GABAPENTIN 100 MG: 100 CAPSULE ORAL at 17:30

## 2022-10-05 RX ADMIN — FLUTICASONE PROPIONATE 2 SPRAY: 50 SPRAY, METERED NASAL at 10:42

## 2022-10-05 RX ADMIN — SODIUM CHLORIDE, PRESERVATIVE FREE 40 MG: 5 INJECTION INTRAVENOUS at 08:15

## 2022-10-05 RX ADMIN — FERROUS SULFATE TAB 325 MG (65 MG ELEMENTAL FE) 325 MG: 325 (65 FE) TAB at 10:38

## 2022-10-05 RX ADMIN — SODIUM CHLORIDE 75 ML/HR: 9 INJECTION, SOLUTION INTRAVENOUS at 01:45

## 2022-10-05 RX ADMIN — LEVOTHYROXINE SODIUM 125 MCG: 0.12 TABLET ORAL at 10:38

## 2022-10-05 RX ADMIN — SODIUM CHLORIDE, PRESERVATIVE FREE 40 MG: 5 INJECTION INTRAVENOUS at 02:54

## 2022-10-05 RX ADMIN — ATORVASTATIN CALCIUM 80 MG: 40 TABLET, FILM COATED ORAL at 10:39

## 2022-10-05 RX ADMIN — GABAPENTIN 100 MG: 100 CAPSULE ORAL at 22:40

## 2022-10-05 RX ADMIN — SODIUM CHLORIDE, PRESERVATIVE FREE 40 MG: 5 INJECTION INTRAVENOUS at 22:40

## 2022-10-05 RX ADMIN — FERROUS SULFATE TAB 325 MG (65 MG ELEMENTAL FE) 325 MG: 325 (65 FE) TAB at 17:30

## 2022-10-05 RX ADMIN — AMLODIPINE BESYLATE 5 MG: 5 TABLET ORAL at 10:33

## 2022-10-05 RX ADMIN — LEVETIRACETAM 250 MG: 250 TABLET, FILM COATED ORAL at 17:30

## 2022-10-05 RX ADMIN — CETIRIZINE HYDROCHLORIDE 10 MG: 10 TABLET, FILM COATED ORAL at 10:34

## 2022-10-05 NOTE — PROGRESS NOTES
Day Shift Nurse Roma Guzman stated that report was given to MED/SURG Nurse and that patient is waiting to be transport to room 2180.

## 2022-10-05 NOTE — PROGRESS NOTES
Problem: Mobility Impaired (Adult and Pediatric)  Goal: *Acute Goals and Plan of Care (Insert Text)  Description: FUNCTIONAL STATUS PRIOR TO ADMISSION: Pt unable to provide detailed history. Lives with daughter who provides assist at baseline. Of note, pt admitted last month and seen by PT who recommended SNF rehab, however family preferred pt return home with their assistance. Physical Therapy Goals  Initiated 10/5/2022  1. Patient will move from supine to sit and sit to supine  in bed with minimal assistance/contact guard assist within 7 day(s). 2.  Patient will transfer from bed to chair and chair to bed with minimal assistance/contact guard assist using the least restrictive device within 7 day(s). 3.  Patient will perform sit to stand with minimal assistance/contact guard assist within 7 day(s). 4.  Patient will ambulate with mod assist for at least 5 feet with the least restrictive device within 7 day(s). Outcome: Not Met   PHYSICAL THERAPY EVALUATION  Patient: Leanne Hernandez (47 y.o. female)  Date: 10/5/2022  Primary Diagnosis: ALEX (acute kidney injury) ([facility] [N17.9]  GIB (gastrointestinal bleeding) [K92.2]  Blood loss anemia [D50.0]  Procedure(s) (LRB):  ESOPHAGOGASTRODUODENOSCOPY (EGD) (N/A)  COLONOSCOPY (N/A)     Precautions: fall       ASSESSMENT  Based on the objective data described below, the patient presents with general weakness L side>R, decreased activity tolerance, impaired balance, gait dysfunction, increased risk for fall s/p admit with anemia. Pt cleared for mobilization following blood transfusion. Oriented to person/ place. Pleasantly confused and cooperative. Pt required increased time and mod A to move supine to sit. Transferred sit to stand to RW with min A x 2 for lift/ balance. Pivoted to/ from [facility] with RW and min A x 2, increased time, increased difficulty clearing floor with L LE. Pt returned to supine at end of session and left with HOB elevated.  Will benefit from con't PT for functional mobility training as tolerated. Anticipate pt ability to return home with continued family assist and [facility] at d/c. Current Level of Function Impacting Discharge (mobility/balance): bed mob mod A, transfer min A x 2 and use of RW    Other factors to consider for discharge: recently admitted, family assist in place     Patient will benefit from skilled therapy intervention to address the above noted impairments. PLAN :  Recommendations and Planned Interventions: bed mobility training, transfer training, gait training, therapeutic exercises, patient and family training/education, and therapeutic activities      Frequency/Duration: Patient will be followed by physical therapy:  4 times a week to address goals.     Recommendation for discharge: (in order for the patient to meet his/her long term goals)  To be determined: likely home with 24/7 assist and [place] PT     This discharge recommendation:  Has not yet been discussed the attending provider and/or case management    IF patient discharges home will need the following DME: to be determined (TBD)         SUBJECTIVE:   Patient pleasantly confused    OBJECTIVE DATA SUMMARY:   HISTORY:    Past Medical History:   Diagnosis Date    Arthritis     Diabetes ([facility] [address]     Hard of hearing     Hypertension     Hypothyroidism     Poor historian      Past Surgical History:   Procedure Laterality Date    HX TUBAL LIGATION      IR PERC ART MECH THROMB INFUSION INTRACRANIAL  5/18/2022       Personal factors and/or comorbidities impacting plan of care: requires assist at baseline    Home Situation  Patient Expects to be Discharged to[de-identified] Home with home health  Current DME Used/Available at Home: [facility] [address]    EXAMINATION/PRESENTATION/DECISION MAKING:   Critical Behavior:  Neurologic State: Alert  Orientation Level: Oriented to person, Oriented to place, Disoriented to time, Disoriented to situation  Cognition: Memory loss  Safety/Judgement: Fall prevention, Decreased insight into deficits    Range Of Motion:  AROM: Generally decreased, functional (LUE and LLE more impaired than R due to recent CVA)           PROM: Generally decreased, functional           Strength:    Strength: Generally decreased, functional                    Tone & Sensation:   Tone: Abnormal              Sensation: Intact               Coordination:  Coordination: Generally decreased, functional  Vision:   Corrective Lenses:  (grossly intact)  Functional Mobility:  Bed Mobility:  Rolling: Moderate assistance  Supine to Sit: Moderate assistance  Sit to Supine: Moderate assistance  Scooting: Maximum assistance  Transfers:  Sit to Stand: Minimum assistance; Additional time;Assist x2  Stand to Sit: Minimum assistance; Additional time;Assist x2 (difficulty bringing LLE back to align with chair)        Bed to Chair: Minimum assistance; Additional time;Assist x2 (stand pivot with RW)              Balance:   Sitting: Intact  Standing: Impaired  Standing - Static: Fair;Constant support  Standing - Dynamic : Fair;Constant support       Physical Therapy Evaluation Charge Determination   History Examination Presentation Decision-Making   HIGH Complexity :3+ comorbidities / personal factors will impact the outcome/ POC  MEDIUM Complexity : 3 Standardized tests and measures addressing body structure, function, activity limitation and / or participation in recreation  LOW Complexity : Stable, uncomplicated  LOW Complexity : FOTO score of       Based on the above components, the patient evaluation is determined to be of the following complexity level: LOW     Pain Rating:  No c/o pain    Activity Tolerance:   Good and Fair    After treatment patient left in no apparent distress:   Supine in bed and Call bell within reach    COMMUNICATION/EDUCATION:   The patients plan of care was discussed with: Registered nurse.      Fall prevention education was provided and the patient/caregiver indicated understanding., Patient/family have participated as able in goal setting and plan of care. , and Patient/family agree to work toward stated goals and plan of care.     Thank you for this referral.  Prasanth Greene, PT   Time Calculation: 27 mins

## 2022-10-05 NOTE — PROGRESS NOTES
Care Management Initial Assessment - Readmission    Transition of Care Plan:    RUR:19% high risk for readmission - pt is a readmit   Disposition: Home with continued 24/7 support from daughter and Anuja Stein   Follow up appointments: PCP, Specialists if indicated   DME needed: Pt owns a walker, shower bench, bedside commode, and wheelchair. Transportation at Discharge: Pt's daughter, Arpit Bachelor or means to access home: Daughter has access to home     IM Medicare Letter: Will need [facility]nd [facility] letter prior to d/c  Is patient a  and connected with the [place]? N/A              If yes, was [employer] transfer form completed and [facility] notified? Caregiver Contact: Pt's daughter, Rosa Rebolledo [phone]  Discharge Caregiver contacted prior to discharge? To be contacted   Care Conference needed?:  No               Reason for Readmission:    Acute blood loss anemia, likely secondary to gastrointestinal hemorrhage, acute kidney injury likely secondary to GI bleed          RUR Score/Risk Level:  19% high risk for readmission       PCP: First and Last name:  Charles Mitchell MD    Name of Practice:    Are you a current patient: Yes/No: Yes   Approximate date of last visit: Yesterday 10/4/22   Can you participate in a virtual visit with your PCP: No    Is a Care Conference indicated:   No    Did you attend your follow up appointment (s): If not, why not:  Yes       Resources/supports as identified by patient/family:  Supportive daughter who helps with all ADLs/IADLs       Top Challenges facing patient (as identified by patient/family and CM): Finances/Medication cost?  Denied concerns, uses [facility] in [place]  Daughter provides all transport needs    Support system or lack thereof? Appears appropriate  Living arrangements? Resides with daughterJerome    Self-care/ADLs/Cognition?   Pt presented as confused; requires support with ADLs/IADLs        Current Advanced Directive/Advance Care Plan:  Advance Care Planning   Healthcare Decision Maker: Np ACP documents; pt's children are Lenord Bras      Today we documented Decision Maker(s) consistent with Legal Next of Kin hierarchy. Plan for utilizing home health:   Pt is open with [facility], will need CLEMENTE orders to resume services prior to d/c             Transition of Care Plan:    Based on readmission, the patient's previous Plan of Care   has been evaluated and/or modified. The current Transition of Care Plan is:        Home with continued 24/7 support from daughter and [address] Management Interventions  PCP Verified by CM: Yes  Palliative Care Criteria Met (RRAT>21 & CHF Dx)?: No  Mode of Transport at Discharge:  Other (see comment) (Daughter)  Transition of Care Consult (CM Consult): Discharge Planning  Discharge Durable Medical Equipment: No (Pt owns all DME required for her care per daughter - walker, shower bench, bedside commode, wheelchair)  Physical Therapy Consult: Yes  Occupational Therapy Consult: Yes  Speech Therapy Consult: Yes  Support Systems: Child(piotr) (Daughter, Yuri Zamora, is main caregiver and support)  Confirm Follow Up Transport: Family (Daughter)  The Patient and/or Patient Representative was Provided with a Choice of Provider and Agrees with the Discharge Plan?: Yes  Freedom of Choice List was Provided with Basic Dialogue that Supports the Patient's Individualized Plan of Care/Goals, Treatment Preferences and Shares the Quality Data Associated with the Providers?: Yes  Discharge Location  Patient Expects to be Discharged to[de-identified] Home with home health (Home with continued full time care from pt's daughter; [address])    Readmission Assessment  Number of days since last admission?: 8-30 days  Previous disposition: Home with Home Health [facility])  Who is being interviewed?: Caregiver (Pt's daughter, Yuri Zamora)  What was the patient's/caregiver's perception as to why they think they needed to return back to the hospital?: Other (Comment) (Abnormal lab results, bladder infection, sent by PCP)  Did you visit your Primary Care Physician after you left the hospital, before you returned this time?: Yes  Did you see a specialist, such as Cardiac, Pulmonary, Orthopedic Physician, etc. after you left the hospital?: No  Who advised the patient to return to the hospital?: Physician (PCP Dr. Tyler Hinds)  Does the patient report anything that got in the way of taking their medications?: No  In our efforts to provide the best possible care to you and others like you, can you think of anything that we could have done to help you after you left the hospital the first time, so that you might not have needed to return so soon?: Other (Comment) (None voiced)    Marie Cronin [facility] [address], [address]

## 2022-10-05 NOTE — PROGRESS NOTES
Problem: Self Care Deficits Care Plan (Adult)  Goal: *Acute Goals and Plan of Care (Insert Text)  Description: FUNCTIONAL STATUS PRIOR TO ADMISSION: unable to accurately obtain from pt due to pleasant confusion, pt reports walking with RW or using wheelchair, sponge bathes with assist, able to feed and groom on her own, needs assist with all other ADLS    HOME SUPPORT PRIOR TO ADMISSION: The patient lived with daughter whom is her paid Medicaid aide per chart review. Was getting [facility] services for therapy. Occupational Therapy Goals:  Initiated 10/5/2022  1. Patient will perform grooming seated with supervision/set-up within 7 days. 2. Patient will perform upper body dressing with minimal assistance within 7 days. 3. Patient will perform toileting with moderate assistance  within 7 days. 4. Patient will transfer from bedside commode or toilet with minimal assist using the least restrictive device and appropriate durable medical equipment within 7 days. 10/5/2022 1154 by HERNÁN Steven/L  Outcome: Not Met  OCCUPATIONAL THERAPY EVALUATION  Patient: Chioma Lowry (11 y.o. female)  Date: 10/5/2022  Primary Diagnosis: ALEX (acute kidney injury) ([facility] [address] [N17.9]  GIB (gastrointestinal bleeding) [K92.2]  Blood loss anemia [D50.0]  Procedure(s) (LRB):  ESOPHAGOGASTRODUODENOSCOPY (EGD) (N/A)  COLONOSCOPY (N/A)     Precautions: fall       ASSESSMENT  Based on the objective data described below, the patient presents with pleasant confusion and was a limited historian. Pt was just at [facility] due to CVA and low HGB and returned due to GIB. Pt was seen after receiving blood transfusion. All vitals were stable this session. Pt is aware that she has LUE and LE deficits but is actively trying to use LUE. Cues needed for supine to sit edge of stretcher due to decreased attention. Moderate assist needed for supine to sit overall and once seated pt was able to achieve balance over time to CGA.   Performed washing of face with left hand with set up. Min assist x2 to stand pivot to bedside commode with RW and pt tends to pull up on walker. Assist needed to get feet in the right spot to achieve standing and pt has difficulty bringing left LLE back to align self with surface. Min assist needed for standing balance statically overall. Noted on most recent admit family only wanted home health and pts daughter is her paid aid through medicaid. Recommend return to home with home health and family assist as prior. Current Level of Function Impacting Discharge (ADLs/self-care): moderate/max assist bed mobility, min assist x2 stand pivot transfer with RW    Feeding: Setup    Oral Facial Hygiene/Grooming: Setup    Bathing: Maximum assistance    Upper Body Dressing: Maximum assistance    Lower Body Dressing: Total assistance    Toileting: Total assistance    Functional Outcome Measure: The patient scored 20/100 on the barthel outcome measure which is indicative of significant decline in mobility and ADLS. Other factors to consider for discharge: needs assist with mobility and ADLS      Patient will benefit from skilled therapy intervention to address the above noted impairments. PLAN :  Recommendations and Planned Interventions: self care training, functional mobility training, therapeutic exercise, balance training, therapeutic activities, neuromuscular re-education, patient education, home safety training, and family training/education    Frequency/Duration: Patient will be followed by occupational therapy 4 times a week to address goals.     Recommendation for discharge: (in order for the patient to meet his/her long term goals)  Occupational therapy at least 2 days/week in the home AND ensure assist and/or supervision for safety with mobility and ADLS    This discharge recommendation:  Has been made in collaboration with the attending provider and/or case management    IF patient discharges home will need the following DME: none       SUBJECTIVE:   Patient stated My daughter helps me.     OBJECTIVE DATA SUMMARY:   HISTORY:   Past Medical History:   Diagnosis Date    Arthritis     Diabetes ([address]     Hard of hearing     Hypertension     Hypothyroidism     Poor historian      Past Surgical History:   Procedure Laterality Date    HX TUBAL LIGATION      IR PERC ART MECH THROMB INFUSION INTRACRANIAL  5/18/2022       Expanded or extensive additional review of patient history:     Home Situation  Patient Expects to be Discharged to[de-identified] Home with home health  Current DME Used/Available at Home: roxann Faust, Wheelchair, Hospital bed    Hand dominance: Right    EXAMINATION OF PERFORMANCE DEFICITS:  Cognitive/Behavioral Status:  Neurologic State: Alert  Orientation Level: Oriented to person;Oriented to place; Disoriented to time;Disoriented to situation  Cognition: Memory loss  Perception: Cues to maintain midline in standing; Tactile;Verbal;Visual  Perseveration: No perseveration noted  Safety/Judgement: Fall prevention;Decreased insight into deficits        Hearing:   [place]    Vision/Perceptual:                                Corrective Lenses:  (grossly intact)    Range of Motion:    AROM: Generally decreased, functional (LUE and LLE more impaired than R due to recent CVA)  PROM: Generally decreased, functional                      Strength:    Strength: Generally decreased, functional                Coordination:  Coordination: Generally decreased, functional  Fine Motor Skills-Upper: Left Impaired;Right Intact    Gross Motor Skills-Upper: Left Impaired;Right Intact    Tone & Sensation:    Tone: Abnormal  Sensation: Intact                      Balance:  Sitting: Intact  Standing: Impaired  Standing - Static: Fair;Constant support  Standing - Dynamic : Fair;Constant support    Functional Mobility and Transfers for ADLs:  Bed Mobility:  Rolling: Moderate assistance  Supine to Sit: Moderate assistance  Sit to Supine:  Moderate assistance  Scooting: Maximum assistance    Transfers:  Sit to Stand: Minimum assistance; Additional time;Assist x2  Stand to Sit: Minimum assistance; Additional time;Assist x2 (difficulty bringing LLE back to align with chair)  Bed to Chair: Minimum assistance; Additional time;Assist x2 (stand pivot with RW)  Bathroom Mobility:  (unable at this time)  Toilet Transfer : Minimum assistance; Additional time;Assist x2 (stand pivot with RW)    ADL Assessment:  Feeding: Setup    Oral Facial Hygiene/Grooming: Setup    Bathing: Maximum assistance    Upper Body Dressing: Maximum assistance    Lower Body Dressing: Total assistance    Toileting: Total assistance                  ADL Intervention and task modifications:  See assessment    Cognitive Retraining  Safety/Judgement: Fall prevention;Decreased insight into deficits    Functional Measure:    Barthel Index:  Bathin  Bladder: 0  Bowels: 5  Groomin  Dressin  Feedin  Mobility: 0  Stairs: 0  Toilet Use: 0  Transfer (Bed to Chair and Back): 10  Total: 20/100      The Barthel ADL Index: Guidelines  1. The index should be used as a record of what a patient does, not as a record of what a patient could do. 2. The main aim is to establish degree of independence from any help, physical or verbal, however minor and for whatever reason. 3. The need for supervision renders the patient not independent. 4. A patient's performance should be established using the best available evidence. Asking the patient, friends/relatives and nurses are the usual sources, but direct observation and common sense are also important. However direct testing is not needed. 5. Usually the patient's performance over the preceding 24-48 hours is important, but occasionally longer periods will be relevant. 6. Middle categories imply that the patient supplies over 50 per cent of the effort. 7. Use of aids to be independent is allowed.     Score Interpretation (from [address] [place] [address])    Independent   60-79 Minimally independent   40-59 Partially dependent   20-39 Very dependent   <20 Totally dependent     -Pamela Odonnell., Barthel, D.W. (1965). Functional evaluation: the Barthel Index. [address] ([address] (1997). The Barthel activities of daily living index: self-reporting versus actual performance in the old (> or = 75 years). Journal of [address] [place] [address] 45(7), [facility], STEPAN, Abeba Pierce., [facility] Christin. (1999). Measuring the change in disability after inpatient rehabilitation; comparison of the responsiveness of the Barthel Index and Functional [place] Measure. Journal of Neurology, Neurosurgery, and Psychiatry, 66(4), 316-520. Mj Mills, N.[place].A, JONES Hopkins, & Antwan Adam MJUDE. (2004) Assessment of post-stroke quality of life in cost-effectiveness studies: The usefulness of the Barthel Index and the EuroQoL-5D. Quality of Life Research, 15, 254-95         Occupational Therapy Evaluation Charge Determination   History Examination Decision-Making   MEDIUM Complexity : Expanded review of history including physical, cognitive and psychosocial  history  MEDIUM Complexity : 3-5 performance deficits relating to physical, cognitive , or psychosocial skils that result in activity limitations and / or participation restrictions MEDIUM Complexity : Patient may present with comorbidities that affect occupational performnce.  Miniml to moderate modification of tasks or assistance (eg, physical or verbal ) with assesment(s) is necessary to enable patient to complete evaluation       Based on the above components, the patient evaluation is determined to be of the following complexity level: MEDIUM  Pain Ratin/10    Activity Tolerance:   Fair and requires rest breaks    After treatment patient left in no apparent distress:    Sitting in chair and Call bell within reach    COMMUNICATION/EDUCATION:   The patients plan of care was discussed with: Physical therapist and Registered nurse. Patient is unable to participate in goal setting and plan of care. This patients plan of care is appropriate for delegation to JENNIFFER.     Thank you for this referral.  Anne Marie Dumont, OTR/L  Time Calculation: 32 mins

## 2022-10-05 NOTE — ANESTHESIA PREPROCEDURE EVALUATION
Relevant Problems   NEUROLOGY   (+) Ischemic stroke (HCC)   (+) Stroke (cerebrum) (HCC)      RENAL FAILURE   (+) ALEX (acute kidney injury) ([facility] [address]      ENDOCRINE   (+) Arthritis      HEMATOLOGY   (+) Blood loss anemia       Anesthetic History   No history of anesthetic complications            Review of Systems / Medical History  Patient summary reviewed, nursing notes reviewed and pertinent labs reviewed    Pulmonary          Smoker      Comments: Former smoker   Neuro/Psych       CVA  TIA    Comments: Ischemic stroke  Lumbar stenosis Cardiovascular    Hypertension          Pacemaker    Exercise tolerance: <4 METS  Comments: 9/2022 ECHO:50-55% EF with mild MR and severely elevated RVSP  EKG 9/24/22: Atrial-paced rhythm   When compared with ECG of 18-MAY-2022 00:06,   No significant change was found    GI/Hepatic/Renal         Renal disease: ARF      Comments: GI bleed Endo/Other    Diabetes: well controlled, type 2  Hypothyroidism  Arthritis and anemia     Other Findings   Comments: Hard of Hearing  Hb 7.9         Physical Exam    Airway  Mallampati: II  TM Distance: 4 - 6 cm  Neck ROM: normal range of motion   Mouth opening: Normal     Cardiovascular  Regular rate and rhythm,  S1 and S2 normal,  no murmur, click, rub, or gallop             Dental    Dentition: Edentulous     Pulmonary  Breath sounds clear to auscultation               Abdominal  GI exam deferred       Other Findings            Anesthetic Plan    ASA: 3  Anesthesia type: general and total IV anesthesia          Induction: Intravenous  Anesthetic plan and risks discussed with: Patient      Propofol MAC/Supernova prn.

## 2022-10-05 NOTE — H&P
PLEASE NOTE: I HAVE GENERATED THIS NOTE WITH THE ASSISTANCE OF VOICE-RECOGNITION TECHNOLOGY. PLEASE EXCUSE ANY SPELLING, GRAMMATICAL, AND SYNTAX ERRORS YOU MAY FIND. IF YOU NEED CLARIFICATION ON ANYTHING, PLEASE FEEL FREE TO REACH OUT TO ME.  [facility]  History and Physical - Dr. Mariely Antoine:     80 y.o. female with pertinent medical hx of recent GI bleed presented with dark stools. Her outpatient hemoglobin was 6.5, and she was sent here by her PCP. Patient did not really have any severe anemia symptoms in addition, her daughter was stating that her urine had a turbid color to it from her chronic indwelling [place] today. VS relatively stable    Physical exam revealed nothing on my exam, but rectal exam by ED physician showed soft external hemorrhoids with dark melanotic stool in the rectal vault    Labs revealed hemoglobin here was 7.3; creatinine was 1.09 with a baseline of about 0.69. ALT and AST were elevated to 132 and 150, respectively, with a baseline of 37 and 45, respectively.     Imaging revealed no acute bleed    Active Problems:    Arthritis ()      Lumbar stenosis (3/13/2012)      Ischemic stroke ([facility] [address] (9/24/2022)      Blood loss anemia (10/4/2022)      GIB (gastrointestinal bleeding) (10/4/2022)      ALEX (acute kidney injury) ([facility] [address] (10/4/2022)  Hypertension  Seizures  Hypothyroidism  Allergies  Hyperlipidemia    Plan:     Acute blood loss anemia, likely secondary to gastrointestinal hemorrhage  -GI consult  -Every 4 H&H  -We will give 1 unit of blood, most recent hemoglobin is 6.7  -Protonix IV twice daily  -N.p.o.  -We will hold patient's home Eliquis until GI sees her  -We will also work-up the patient's anemia just to make sure there are no other etiologies    Acute kidney injury likely secondary to GI bleed  -We will fluid resuscitate the patient  -We will treat the GI bleed    Hypertension  -Continue home Norvasc    Hyperlipidemia  -Continue home atorvastatin    Hypothyroidism  -Continue home Synthroid    Allergies  -Continue home Claritin, fluticasone and Symbicort    If code status was discussed with the patient, then code status entered as per the orders                          Subjective:   Primary Care Provider: Nigel Prasad MD  Date of Service:  See Date Note Was Originated  Chief Complaint: Low hemoglobin    80 y.o. female presents with unknown duration of unknown onset, low hemoglobin, remitted by nothing, exacerbated by nothing. Review of Systems:  12 point ROS obtained and otherwise negative, except as per HPI and above. Past Medical History:   Diagnosis Date    Arthritis     Diabetes ([address]     Hard of hearing     Hypertension     Hypothyroidism     Poor historian       Past Surgical History:   Procedure Laterality Date    HX TUBAL LIGATION      [facility]  [facility]20[facility]     Prior to Admission medications    Medication Sig Start Date End Date Taking? Authorizing Provider   atorvastatin (LIPITOR) 80 mg tablet Take 1 Tablet by mouth daily for 30 days. 9/29/22 10/29/22  Erik Fox MD   levETIRAcetam (KEPPRA) 250 mg tablet Take 1 Tablet by mouth two (2) times a day. 9/29/22   MendelZack Harden, MD   pantoprazole (PROTONIX) 40 mg tablet Take 1 Tablet by mouth daily for 30 days. 9/29/22 10/29/22  MendelZack Harden, MD   apixaban (ELIQUIS) 5 mg tablet Take 1 Tablet by mouth two (2) times a day for 30 days. 9/29/22 10/29/22  Erik Fox MD   FeroSuL 325 mg (65 mg iron) tablet Take 325 mg by mouth two (2) times daily (with meals). 8/22/22   Provider, Historical   fluticasone propionate (FLONASE) 50 mcg/actuation nasal spray SHAKE LIQUID AND USE 2 SPRAYS IN EACH NOSTRIL DAILY 8/31/22   Provider, Historical   Symbicort 80-4.5 mcg/actuation HFAA Take 2 Puffs by inhalation daily.  9/23/22   Provider, Historical   gabapentin (NEURONTIN) 100 mg capsule Take 100 mg by mouth three (3) times daily. 8/22/22   Provider, Historical   loratadine (CLARITIN) 10 mg tablet Take 10 mg by mouth daily. 6/24/22   Provider, Historical   levothyroxine (SYNTHROID) 125 mcg tablet Take 125 mcg by mouth daily. 8/22/22   Provider, Historical   amLODIPine (NORVASC) 5 mg tablet Take 5 mg by mouth daily. Provider, Historical   timolol (TIMOPTIC) 0.5 % ophthalmic solution Administer 1 Drop to both eyes two (2) times a day. 5/23/22   Juarez Coffman MD   brimonidine-timoloL (COMBIGAN) 0.2-0.5 % drop ophthalmic solution Administer 1 Drop to both eyes two (2) times a day. Provider, Historical     No Known Allergies   No family history on file. .  Patient has family history of GI bleed  Social History     Socioeconomic History    Marital status:    Tobacco Use    Smoking status: Former   Substance and Sexual Activity    Alcohol use: No   .  Objective:   Physical Exam:     VS as below    Const'l:          Normal body habitus, a&o, no acute distress  Head/Neck:       no cervical/head mass  Eyes:     nonicteric sclera, eom intact  ENT:      auditory acuity grossly intact either with or without device, no nasal deformity  Cardio:           Regular rate regular rhythm  Pulm:     no accessory muscle use  Abd:       S NT ND  Derm:     no rashes, no ulcers, no lesions  Extr:      No edema, no cyanosis, no calf tenderness, no varicosities  Neuro:    cn II-XII intact  Psych:   mood intact, judgement intact    Data Review: All diagnostic labs and studies have been reviewed.

## 2022-10-05 NOTE — PROGRESS NOTES
TRANSFER - OUT REPORT:    Verbal report given to [facility], RN(name) on Keri Campbell  being transferred to <ed-tele(unit) for routine progression of care       Report consisted of patients Situation, Background, Assessment and   Recommendations(SBAR). Information from the following report(s) SBAR, Kardex, and Cardiac Rhythm a-paced  was reviewed with the receiving nurse. Lines:   Peripheral IV 99/91/40 Left Basilic (Active)   Site Assessment Clean, dry, & intact 10/05/22 1450   Phlebitis Assessment 0 10/05/22 1450   Infiltration Assessment 0 10/05/22 1450   Dressing Status Clean, dry, & intact 10/05/22 1450   Dressing Type Tape;Transparent 10/05/22 1450   Hub Color/Line Status Pink;Flushed;Patent 10/05/22 1450   Action Taken Open ports on tubing capped 10/05/22 1450   Alcohol Cap Used Yes 10/05/22 1450       Peripheral IV 21/28/55 Right Basilic (Active)   Site Assessment Clean, dry, & intact 10/05/22 1450   Phlebitis Assessment 0 10/05/22 1450   Infiltration Assessment 0 10/05/22 1450   Dressing Status Clean, dry, & intact 10/05/22 1450   Dressing Type Tape;Transparent 10/05/22 1450   Hub Color/Line Status Pink;Flushed;Patent 10/05/22 1450   Action Taken Open ports on tubing capped 10/05/22 1450   Alcohol Cap Used Yes 10/05/22 1450        Opportunity for questions and clarification was provided.

## 2022-10-05 NOTE — CONSULTS
Gastroenterology Consult  ([place], [place] for Dr. Bethel Sanchez)     Referring Physician: Dr. Irma Flores Date: 10/5/2022     Subjective:     Chief Complaint: black stools, anemia    History of Present Illness: Chioma Lowry is a 80 y.o. 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 Patient is not oriented at all and cannot provide any history. History is obtained from review of medical records as well as by phone with daughter Michelle Hand. Well known from recent hospital admission discharged one week ago. Her Eliquis was resumed at discharge as her second CVA occurred after her Eliquis was discontinued due to concern for GIB. Her hgb was 7.7 on 9/29/22 but at PCP's office yesterday it was 6.5 so she was sent to the ER. She was transfused 1 unit of PRBCs and hgb is improved to 8.3. Rectal exam by ER attending revealed black, tarry stools that were heme positive. Daughter confirms that she's been having black stools at home since discharge and has not been taking any oral iron. Her last dose of Eliquis was yesterday AM.      She had unremarkable EGD at [facility] on 9/12/2022 per care everywhere. Her last colonoscopy was >10 yrs ago. One of her daughters had colon cancer  No significant weight loss per daughter    CT Results (most recent):  Results from [place] encounter on 10/04/22    CT ABD PELV W WO CONT    Narrative  EXAM:  CT ABDOMEN PELVIS WITH CONTRAST  INDICATION:  GIB with anemia. Additional history:  COMPARISON: None. .  TECHNIQUE:  Multislice helical CT was performed from the diaphragm to the symphysis pubis  before and after intravenous contrast administration. Contiguous 5 mm axial  images were reconstructed and lung and soft tissue windows were generated. Coronal and sagittal reformations were generated.   CT dose reduction was achieved through use of a standardized protocol tailored  for this examination and automatic exposure control for dose modulation. [facility] FINDINGS:  INCIDENTALLY IMAGED CHEST:  Heart/vessels: Diminished attenuation in the blood pool suggesting anemia. Cardiac leads in the right atrium and right ventricle. Lungs/Pleura: Respiratory motion. Small, bilateral pleural effusions. Calcified  granulomas. .  ABDOMEN:  Liver: Calculi in the liver suggesting remote granulomatous disease. Gallbladder/Biliary: Calculi in the dependent aspect of the gallbladder. Spleen: Calculi in the spleen suggesting remote granulomatous disease. Pancreas: Within normal limits. Adrenals: Within normal limits. Kidneys: Small, high attenuation lesion within the lateral aspect of the right  kidney suggesting a hemorrhagic cyst. Exophytic, low-attenuation, nonenhancing  lesion projecting off the posterior, lower pole the right kidney. Multiple,  small, low-attenuation lesions in both kidneys which are incompletely  characterized. Hyperemia within the left renal pelvis  Peritoneum/Mesenteries: Within normal limits. Extraperitoneum: Within normal limits. Gastrointestinal tract: Within normal limits. Vascular: Calcifications in the aorta. Nicole Greenberg PELVIS:  Extraperitoneum: Within normal limits. Ureters: Proximal ureters are unremarkable. The distal ureters are not well  visualized. Bladder: There is a Barajas catheter within the bladder which has thickened and  irregular walls. Reproductive System: Pessary present. .  MSK:  Degenerative changes throughout the spine. Posterior pedicle screw and shabbir  fixation of L4 with L5. Degenerative disc disease at L2/L3 and L3/L4. Generalized muscle atrophy  . Impression  1. No visible gastrointestinal bleeding. 2. There is irregular, concentric mural thickening in the bladder. Recommend  clinical correlation for mass or cystitis. 3. Bilateral pleural effusions.   4. There is hyperemia within the left renal collecting system which may  represent pyelitis. 5. Incidental findings as above. Past Medical History:   Diagnosis Date    Arthritis     Diabetes ([address]     Hard of hearing     Hypertension     Hypothyroidism     Poor historian      Past Surgical History:   Procedure Laterality Date    HX TUBAL LIGATION      [facility]  [facility]      No family history on file. Social History     Tobacco Use    Smoking status: Former    Smokeless tobacco: Not on file   Substance Use Topics    Alcohol use: No      No Known Allergies  Current Facility-Administered Medications   Medication Dose Route Frequency    pantoprazole (PROTONIX) 40 mg in 0.9% sodium chloride 10 mL injection  40 mg IntraVENous Q12H    0.9% sodium chloride infusion 250 mL  250 mL IntraVENous PRN    ondansetron (ZOFRAN) injection 4 mg  4 mg IntraVENous Q4H PRN    0.9% sodium chloride infusion 250 mL  250 mL IntraVENous PRN    ferrous sulfate tablet 325 mg  325 mg Oral BID WITH MEALS    fluticasone propionate (FLONASE) 50 mcg/actuation nasal spray 2 Spray  2 Spray Both Nostrils DAILY    gabapentin (NEURONTIN) capsule 100 mg  100 mg Oral TID    levothyroxine (SYNTHROID) tablet 125 mcg  125 mcg Oral DAILY    amLODIPine (NORVASC) tablet 5 mg  5 mg Oral DAILY    atorvastatin (LIPITOR) tablet 80 mg  80 mg Oral DAILY    levETIRAcetam (KEPPRA) tablet 250 mg  250 mg Oral BID    arformoterol 15 mcg/budesonide 0.5 mg neb solution   Nebulization BID RT    cetirizine (ZYRTEC) tablet 10 mg  10 mg Oral DAILY    peg 3350-electrolytes (COLYTE) 4000 mL  4,000 mL Oral ONCE    acetaminophen (TYLENOL) tablet 650 mg  650 mg Oral Q6H PRN     Current Outpatient Medications   Medication Sig    atorvastatin (LIPITOR) 80 mg tablet Take 1 Tablet by mouth daily for 30 days. levETIRAcetam (KEPPRA) 250 mg tablet Take 1 Tablet by mouth two (2) times a day. pantoprazole (PROTONIX) 40 mg tablet Take 1 Tablet by mouth daily for 30 days.     apixaban (ELIQUIS) 5 mg tablet Take 1 Tablet by mouth two (2) times a day for 30 days. FeroSuL 325 mg (65 mg iron) tablet Take 325 mg by mouth two (2) times daily (with meals). fluticasone propionate (FLONASE) 50 mcg/actuation nasal spray SHAKE LIQUID AND USE 2 SPRAYS IN EACH NOSTRIL DAILY    Symbicort 80-4.5 mcg/actuation HFAA Take 2 Puffs by inhalation daily. gabapentin (NEURONTIN) 100 mg capsule Take 100 mg by mouth three (3) times daily. loratadine (CLARITIN) 10 mg tablet Take 10 mg by mouth daily. levothyroxine (SYNTHROID) 125 mcg tablet Take 125 mcg by mouth daily. amLODIPine (NORVASC) 5 mg tablet Take 5 mg by mouth daily. timolol (TIMOPTIC) 0.5 % ophthalmic solution Administer 1 Drop to both eyes two (2) times a day. brimonidine-timoloL (COMBIGAN) 0.2-0.5 % drop ophthalmic solution Administer 1 Drop to both eyes two (2) times a day. Review of Systems:  Cannot obtain due to AMS    Objective:     Physical Exam:  Visit Vitals  BP (!) 176/82 (BP 1 Location: Left lower arm, BP Patient Position: Supine)   Pulse 66   Temp 97 °F (36.1 °C)   Resp 14   Ht 5' 6\" (1.676 m)   Wt 75.9 kg (167 lb 5.3 oz)   SpO2 100%   BMI 27.01 kg/m²        Gen: elderly black female in nad, pleasantly confused  Skin:  Extremities and face reveal no rashes. HEENT: Sclerae anicteric. Extra-occular muscles are intact. Cardiovascular: Regular rate and rhythm. No murmurs, gallops, or rubs. Respiratory:  Comfortable breathing with no accessory muscle use. Clear breath sounds with no wheezes, rales, or rhonchi. GI:  Abdomen nondistended, soft, and nontender. Normal active bowel sounds. No enlargement of the liver or spleen. No masses palpable. Rectal:  Deferred  Musculoskeletal:  No pitting edema of the lower legs. Neurological:  Alert but not oriented except to self  Psychiatric:  pleasant. Lymphatic:  No cervical or supraclavicular adenopathy.     Lab/Data Review:  CMP:   Lab Results   Component Value Date/Time     10/05/2022 03:38 AM    K 3.8 10/05/2022 03:38 AM     10/05/2022 03:38 AM    CO2 34 (H) 10/05/2022 03:38 AM    AGAP 2 (L) 10/05/2022 03:38 AM    GLU 94 10/05/2022 03:38 AM    BUN 11 10/05/2022 03:38 AM    CREA 0.94 10/05/2022 03:38 AM    CA 9.1 10/05/2022 03:38 AM    ALB 2.1 (L) 10/05/2022 03:38 AM    TP 5.5 (L) 10/05/2022 03:38 AM    GLOB 3.4 10/05/2022 03:38 AM    AGRAT 0.6 (L) 10/05/2022 03:38 AM     (H) 10/05/2022 03:38 AM     CBC:   Lab Results   Component Value Date/Time    WBC 5.9 10/05/2022 03:38 AM    HGB 8.3 (L) 10/05/2022 10:28 AM    HCT 26.3 (L) 10/05/2022 10:28 AM     10/05/2022 03:38 AM     CT Results (most recent):  Results from [place] encounter on 10/04/22    CT ABD PELV W WO CONT    Narrative  EXAM:  CT ABDOMEN PELVIS WITH CONTRAST  INDICATION:  GIB with anemia. Additional history:  COMPARISON: None. .  TECHNIQUE:  Multislice helical CT was performed from the diaphragm to the symphysis pubis  before and after intravenous contrast administration. Contiguous 5 mm axial  images were reconstructed and lung and soft tissue windows were generated. Coronal and sagittal reformations were generated. CT dose reduction was achieved through use of a standardized protocol tailored  for this examination and automatic exposure control for dose modulation. Azael Amend FINDINGS:  INCIDENTALLY IMAGED CHEST:  Heart/vessels: Diminished attenuation in the blood pool suggesting anemia. Cardiac leads in the right atrium and right ventricle. Lungs/Pleura: Respiratory motion. Small, bilateral pleural effusions. Calcified  granulomas. .  ABDOMEN:  Liver: Calculi in the liver suggesting remote granulomatous disease. Gallbladder/Biliary: Calculi in the dependent aspect of the gallbladder. Spleen: Calculi in the spleen suggesting remote granulomatous disease. Pancreas: Within normal limits. Adrenals: Within normal limits.   Kidneys: Small, high attenuation lesion within the lateral aspect of the right  kidney suggesting a hemorrhagic cyst. Exophytic, low-attenuation, nonenhancing  lesion projecting off the posterior, lower pole the right kidney. Multiple,  small, low-attenuation lesions in both kidneys which are incompletely  characterized. Hyperemia within the left renal pelvis  Peritoneum/Mesenteries: Within normal limits. Extraperitoneum: Within normal limits. Gastrointestinal tract: Within normal limits. Vascular: Calcifications in the aorta. Eloisa Fanning PELVIS:  Extraperitoneum: Within normal limits. Ureters: Proximal ureters are unremarkable. The distal ureters are not well  visualized. Bladder: There is a Barajas catheter within the bladder which has thickened and  irregular walls. Reproductive System: Pessary present. .  MSK:  Degenerative changes throughout the spine. Posterior pedicle screw and shabbir  fixation of L4 with L5. Degenerative disc disease at L2/L3 and L3/L4. Generalized muscle atrophy  . Impression  1. No visible gastrointestinal bleeding. 2. There is irregular, concentric mural thickening in the bladder. Recommend  clinical correlation for mass or cystitis. 3. Bilateral pleural effusions. 4. There is hyperemia within the left renal collecting system which may  represent pyelitis. 5. Incidental findings as above. Assessment/Plan:     BECKY  GIB  Melena  Anticoagulation  PMH CVA  AMS  Severe pulmonary HTN  DM         79 y/o female with hx of recurrent CVA on Eliquis presents with melena and recurrent blood loss anemia. She has not had any hypotension or tachycardia since her arrival.  Her hgb is improved to 8.3 s/p 1 unit of PRBCs and her Eliquis has been held (last dose AM 10/4/22). I discussed the case with Dr. Stephania Schirmer and we recommend proceeding with an EGD/Colonoscopy tomorrow (posted for 1100). I discussed our recommendations with the patient's daughter, Jarrett Coffey, who is in agreement with proceeding.   We discussed that given her mother's age and comorbidities, including severe pulmonary HTN and recent stroke, she is at risk for anesthesia complications but that we need to figure out where the bleeding is coming from and treat it so that she can safely resume her Eliquis as she is high risk for recurrent CVA. She voices understanding and agreement with proceeding. We will place her on a diabetic clear liquid diet (no red dyes please) and prep her with a full gallon golytely bowel prep. She should be kept NPO after MN and hgb should be followed with transfusion as needed. Continue empiric PPI Q12hrs.     QUOC Gunter  10/05/22  11:28 AM

## 2022-10-05 NOTE — PROGRESS NOTES
Physician Progress Note      Blanca Delgado  CSN #:                  [id number]  :                       1928  ADMIT DATE:       10/4/2022 4:17 PM  [address] [place] DATE:  RESPONDING  PROVIDER #:        Alejandro Salazar MD          QUERY TEXT:    Patient admitted with gib. Documentation reflects uti d/t Barajas (ED). If possible, please document in the progress notes and discharge summary if uti d/t Barajas was: The medical record reflects the following:  Risk Factors: 89F w Barajas  Clinical Indicators: ed-Urinary tract infection associated with indwelling urethral catheter, initial encounter ([facility] [address] consistent with a UTI and it is catheter associated UTI so we will treat  H&P-daughter was stating that her urine had a turbid color to it from her chronic indwelling Barajas today. Treatment: Rocephin, jesus Ascencio,  Rubio Douglass RN/CDI   Options provided:  -- uti due to Barajas confirmed after study  -- uti due to Barajas  treated and resolved  -- uti due to Barajas  ruled out after study  -- Other - I will add my own diagnosis  -- Disagree - Not applicable / Not valid  -- Disagree - Clinically unable to determine / Unknown  -- Refer to Clinical Documentation Reviewer    PROVIDER RESPONSE TEXT:    Provider is clinically unable to determine a response to this query.     Query created by: Marlinda Holter on 10/5/2022 12:07 PM      Electronically signed by:  Alejandro Salazar MD 10/5/2022 12:28 PM

## 2022-10-05 NOTE — PROGRESS NOTES
Physical Therapy    Order acknowledged, chart reviewed. Noted pt with Hgb 6.3, receiving blood transfusion. Will defer PT evaluation and continue to follow.     Ronan Sheikh, PT, MPT

## 2022-10-05 NOTE — PROGRESS NOTES
Problem: Dysphagia (Adult)  Goal: *Acute Goals and Plan of Care (Insert Text)  Description: Speech Pathology Goals  Initiated 10/5/2022    1. Patient will tolerate Puree/Thin Liquids without signs of aspiration or adverse effects within 7 days. Outcome: Progressing Towards Goal     SPEECH LANGUAGE PATHOLOGY BEDSIDE SWALLOW EVALUATION  Patient: Uyen Garcia (79 y.o. female)  Date: 10/5/2022  Primary Diagnosis: ALEX (acute kidney injury) ([facility] [address] [N17.9]  GIB (gastrointestinal bleeding) [K92.2]  Blood loss anemia [D50.0]       Precautions:        ASSESSMENT :  Based on the objective data described below, the patient presents with her baseline swallow function, characterized by moderate oral dysphagia, given dental status and advanced age, and a grossly functional pharyngeal phase. Patient is familiar to SLP department and was recently tolerating Puree/Thin Liquid diet during previous admission on 9/29. RN cleared SLP visit today. Patient tolerated ice chips, thin liquid, and puree without overt difficulty or signs of aspiration on this date. At this juncture, patient is felt safe to initiate Puree/Thin Liquid diet with general aspiration precautions outlined below. Recommend medication crushed in puree. RN educated re: SLP evaluation. SLP to follow up x1 to ensure diet tolerance. Patient will benefit from skilled intervention to address the above impairments. Patients rehabilitation potential is considered to be guarded. PLAN :  Recommendations and Planned Interventions:  -- Puree/Thin Liquids  -- Medication as Tolerated  -- 1:1 Assistance with PO, as needed  -- Small Bites/Sips  -- Sitting Upright for all PO    Frequency/Duration: Patient will be followed by speech-language pathology 2 times a week to address goals. Discharge Recommendations: To Be Determined     SUBJECTIVE:   Patient stated that water was so good.     OBJECTIVE:     Past Medical History:   Diagnosis Date    Arthritis     Diabetes ([facility] [address] Hard of hearing     Hypertension     Hypothyroidism     Poor historian      Past Surgical History:   Procedure Laterality Date    HX TUBAL LIGATION      IR PERC ART MECH THROMB INFUSION INTRACRANIAL  5/18/2022     Prior Level of Function/Home Situation:        Diet prior to admission: Patient was most recently on Puree/Thin Liquid diet during recent prior admission  Current Diet: NPO     Cognitive and Communication Status:  Neurologic State: Eyes open to voice, Alert  Orientation Level: Oriented to person, Oriented to place, Other (Comment) (Oriented to [employer]")  Cognition: Follows commands     Perseveration: No perseveration noted  Safety/Judgement: Awareness of environment    Oral Assessment:  Oral Assessment  Labial: No impairment  Dentition: Edentulous  Oral Hygiene: Dry oral mucosa  Lingual: No impairment  Velum: Unable to visualize  Mandible: No impairment    P.O. Trials:  Patient Position: Upright in bed  Vocal quality prior to P.O.: No impairment  Consistency Presented: Ice chips; Thin liquid;Puree  How Presented: SLP-fed/presented;Straw;Successive swallows;Spoon     Bolus Acceptance: No impairment  Bolus Formation/Control: Impaired  Type of Impairment: Delayed  Propulsion: No impairment  Oral Residue: None  Initiation of Swallow: No impairment  Laryngeal Elevation: Functional  Aspiration Signs/Symptoms: None  Pharyngeal Phase Characteristics: No impairment, issues, or problems              Oral Phase Severity: Mild-moderate  Pharyngeal Phase Severity : No impairment    NOMS:   The NOMS functional outcome measure was used to quantify this patient's level of swallowing impairment. Based on the NOMS, the patient was determined to be at level 4 for swallow function.      NOMS Swallowing Levels:  Level 1 (CN): NPO  Level 2 (CM): NPO but takes consistency in therapy  Level 3 (CL): Takes less than 50% of nutrition p.o. and continues with nonoral feedings; and/or safe with mod cues; and/or max diet restriction  Level 4 (CK): Safe swallow but needs mod cues; and/or mod diet restriction; and/or still requires some nonoral feeding/supplements  Level 5 (CJ): Safe swallow with min diet restriction; and/or needs min cues  Level 6 (CI): Independent with p.o.; rare cues; usually self cues; may need to avoid some foods or needs extra time  Level 7 ([address] [place] [address]): Independent for all p.o.  MARK. (2003). National Outcomes Measurement System (NOMS): Adult Speech-Language Pathology User's Guide. Pain:             After treatment:   Patient left in no apparent distress in bed, Call bell within reach, and Nursing notified    COMMUNICATION/EDUCATION:   Patient was educated regarding role of SLP. She demonstrated fair understanding as evidenced by nodding to verbalize understanding. The patient's plan of care including recommendations, planned interventions, and recommended diet changes were discussed with: Registered nurse. Patient/family have participated as able in goal setting and plan of care. Patient/family agree to work toward stated goals and plan of care.     Thank you for this referral.  BRANDY Yost  Time Calculation: 15 mins

## 2022-10-05 NOTE — ED NOTES
Spoke to Dr. Leia Bray concerning consent for blood transfusion. States ok to hang blood due to emergent low hgb of 7. States he will place a note in chart to address issue.

## 2022-10-05 NOTE — PROGRESS NOTES
Attempted to see pt for OT services. Pts HGB is 6.3  and pt is currently receiving blood. Will defer but continue to follow.

## 2022-10-05 NOTE — ED NOTES
TRANSITION OF CARE - SBAR OUT    Patient is being transferred [facility] ED  Room# 37. Report GIVEN TO Ernie Jung RN on Imelda Iniguez for routine progression of care. Report is consisted of the following information SBAR, ED Summary, Intake/Output, MAR and Recent Results. Patient transferred to receiving unit by: RN (RN or Tech Name)     Called outstanding consults: Yes   Collected routine labs: Yes     All current orders reviewed with accepting nurse: Yes    The following personal items will be sent with the patient during transfer to the floor:   All valuables:                          CARDIAC MONITORING ORDERED: Yes     The following CURRENT information were reported to the receiving RN:    CODE STATUS: Full Code    NIH SCORE:    LEISA SCREENING:      NEURO ASSESSMENT: Neuro  Neurologic State: Alert (10/05/22 1100)  Orientation Level: Oriented to person, Oriented to place, Disoriented to time, Disoriented to situation (10/05/22 1100)  Cognition: Memory loss (10/05/22 1100)      RESTRAINTS IN USE: No      IS DOCUMENTATION COMPLETE: Yes      Vital Signs  Level of Consciousness: Responds to Voice (1) (10/05/22 0927)  Temp: 97 °F (36.1 °C) (10/05/22 0927)  Temp Source: Axillary (10/05/22 0927)  Pulse (Heart Rate): 66 (10/05/22 1102)  Heart Rate Source: Monitor (10/05/22 0628)  Cardiac Rhythm: Atrial Paced (10/05/22 0817)  Resp Rate: 14 (10/05/22 1100)  BP: (!) 176/82 (10/05/22 1102)  MAP (Monitor): 118 (10/05/22 1100)  MAP (Calculated): 113 (10/05/22 1102)  BP 1 Location: Left lower arm (10/05/22 1102)  BP 1 Method: Automatic (10/05/22 1102)  BP Patient Position: Supine (10/05/22 1102)  MEWS Score: 1 (10/05/22 0927)         OXYGEN: Oxygen Therapy  O2 Device: Nasal cannula (10/05/22 1102)  O2 Flow Rate (L/min): 3 l/min (10/05/22 1102)    KINDER FALL ASSESSMENT:  Presents to emergency department  because of falls (Syncope, seizure, or loss of consciousness): No, Age > 79: Yes, Altered Mental Status, Intoxication with alcohol or substance confusion (Disorientation, impaired judgment, poor safety awaremess, or inability to follow instructions): Yes, Impaired Mobility: Ambulates or transfers with assistive devices or assistance; Unable to ambulate or transer.: Yes, Nursing Judgement : Yes    WOUNDS: No      URINARY CATHETER: sol    LINE ACCESS:   Peripheral IV 18/10/76 Left Basilic (Active)   Site Assessment Clean, dry, & intact 10/04/22 1746   Phlebitis Assessment 0 10/04/22 1746   Infiltration Assessment 0 10/04/22 1746   Dressing Status Clean, dry, & intact 10/04/22 1746   Dressing Type Tape;Transparent 10/04/22 1746   Hub Color/Line Status Pink;Flushed;Patent 10/04/22 1746   Action Taken Blood drawn 10/04/22 1746       Peripheral IV 29/08/49 Right Basilic (Active)   Site Assessment Clean, dry, & intact 10/04/22 1747   Phlebitis Assessment 0 10/04/22 1747   Infiltration Assessment 0 10/04/22 1747   Dressing Status Clean, dry, & intact 10/04/22 1747   Dressing Type Tape;Transparent 10/04/22 1747   Hub Color/Line Status Pink;Flushed;Patent 10/04/22 1747   Action Taken Blood drawn 10/04/22 1747        Opportunity for questions and clarification were provided.   Melvin Moralez RN

## 2022-10-05 NOTE — PROGRESS NOTES
Hospitalist Progress Note    NAME: Hammad Wellington   :  1928   MRN:  [id number]       Assessment / Plan:  Acute blood loss anemia, likely secondary to gastrointestinal hemorrhage  -GI consulted  -Every 4 H&H  -s/p 2 units PRBC, goal hgb > 7  -Protonix IV twice daily  -N.p.o.  -Received 2 units PRBC  -Hold eliquis, appears it had been resumed from recent DC  -Check iron panel    Recent CVA  S/p TNK  Unable to undergo MRI due to pacer  C/w high intensity statin  Remains on keppra for seizure ppx  Diet puree with thin liquids when able to tolerate PO    PAF  S/p PPM  Holding eliquis    Acute kidney injury   -c/w light hydration  -Cr improving  -Likely from decreased volume from presumed GIB    Hypertension  -Continue home Norvasc    Hyperlipidemia  -Continue home atorvastatin    Hypothyroidism  -Continue home Synthroid    Allergies  -Continue home Claritin, fluticasone and Symbicort    PPX SCD    Code status: Full       Subjective:     Chief Complaint / Reason for Physician Visit  Patient seen and examined at the bedside. She currently has no complaints. She was sleeping. Currently AAO x3. Discussed with RN events overnight. Review of Systems:  Symptom Y/N Comments  Symptom Y/N Comments   Fever/Chills    Chest Pain     Poor Appetite    Edema     Cough    Abdominal Pain     Sputum    Joint Pain     SOB/HAWKINS    Pruritis/Rash     Nausea/vomit    Tolerating PT/OT     Diarrhea    Tolerating Diet     Constipation    Other       Could NOT obtain due to:      Objective:     VITALS:   Last 24hrs VS reviewed since prior progress note.  Most recent are:  Patient Vitals for the past 24 hrs:   Temp Pulse Resp BP SpO2   10/05/22 0628 97.6 °F (36.4 °C) 63 18 (!) 151/75 100 %   10/05/22 0613 97.7 °F (36.5 °C) 66 18 (!) 154/79 96 %   10/05/22 0506 -- 74 15 (!) 144/67 100 %   10/05/22 0406 -- 61 15 138/69 100 %   10/05/22 0306 -- 61 16 138/64 100 %   10/05/22 0206 -- 65 19 (!) 126/58 100 %   10/05/22 0106 -- 69 14 (!) 124/55 100 %   10/05/22 0100 -- -- -- -- 98 %   10/05/22 0006 -- 60 15 (!) 118/57 100 %   10/04/22 2306 -- 67 14 (!) 147/67 100 %   10/04/22 2232 -- 68 15 (!) 130/59 100 %   10/04/22 2217 -- 74 15 131/62 100 %   10/04/22 2136 -- 69 19 (!) 149/64 100 %   10/04/22 2106 -- 62 20 139/72 100 %   10/04/22 2021 -- 63 19 126/73 100 %   10/04/22 1642 -- 66 23 (!) 131/59 --   10/04/22 1612 97.9 °F (36.6 °C) 61 16 (!) 142/69 100 %       Intake/Output Summary (Last 24 hours) at 10/5/2022 0919  Last data filed at 10/5/2022 6106  Gross per 24 hour   Intake 860 ml   Output 300 ml   Net 560 ml        I had a face to face encounter and independently examined this patient on 10/5/2022, as outlined below:  PHYSICAL EXAM:  General: WD, WN. Alert, cooperative, no acute distress    EENT:  EOMI. Anicteric sclerae. MMM  Resp:  CTA bilaterally, no wheezing or rales. No accessory muscle use  CV:  Regular  rhythm,  No edema  GI:  Soft, Non distended, Non tender. +Bowel sounds  Neurologic:  Alert and oriented X 3, normal speech,   Psych:   Good insight. Not anxious nor agitated  Skin:  No rashes. No jaundice    Reviewed most current lab test results and cultures  YES  Reviewed most current radiology test results   YES  Review and summation of old records today    NO  Reviewed patient's current orders and MAR    YES  PMH/SH reviewed - no change compared to H&P  ________________________________________________________________________  Care Plan discussed with:    Comments   Patient     Family      RN     Care Manager     Consultant                        Multidiciplinary team rounds were held today with , nursing, pharmacist and clinical coordinator. Patient's plan of care was discussed; medications were reviewed and discharge planning was addressed.      ________________________________________________________________________  Total NON critical care TIME:  40  Minutes    Total CRITICAL CARE TIME Spent:   Minutes non procedure based      Comments   >50% of visit spent in counseling and coordination of care     ________________________________________________________________________  Sol Ralph MD     Procedures: see electronic medical records for all procedures/Xrays and details which were not copied into this note but were reviewed prior to creation of Plan. LABS:  I reviewed today's most current labs and imaging studies.   Pertinent labs include:  Recent Labs     10/05/22  0338 10/05/22  0250 10/04/22  1720   WBC 5.9  --  8.8   HGB 6.3* 6.7* 7.3*   HCT 20.5* 21.1* 23.2*     --  238     Recent Labs     10/05/22  0338 10/04/22  1720    143   K 3.8 3.7    105   CO2 34* 36*   GLU 94 130*   BUN 11 11   CREA 0.94 1.09*   CA 9.1 9.3   ALB 2.1* 2.5*   TBILI 0.5 0.7   * 132*       Signed: Sol Ralph MD

## 2022-10-05 NOTE — ED NOTES
Assumed care of pt. Pt is resting comfortably in stretcher, RR even and unlabored. No needs verbalized at this time.

## 2022-10-06 ENCOUNTER — ANESTHESIA (OUTPATIENT)
Dept: ENDOSCOPY | Age: 87
DRG: 375 | End: 2022-10-06
Payer: MEDICARE

## 2022-10-06 LAB
ABO + RH BLD: NORMAL
ALBUMIN SERPL-MCNC: 2.2 G/DL (ref 3.5–5)
ALBUMIN/GLOB SERPL: 0.7 {RATIO} (ref 1.1–2.2)
ALP SERPL-CCNC: 77 U/L (ref 45–117)
ALT SERPL-CCNC: 136 U/L (ref 12–78)
ANION GAP SERPL CALC-SCNC: 1 MMOL/L (ref 5–15)
AST SERPL-CCNC: 142 U/L (ref 15–37)
BILIRUB SERPL-MCNC: 1.4 MG/DL (ref 0.2–1)
BLD PROD TYP BPU: NORMAL
BLOOD GROUP ANTIBODIES SERPL: NORMAL
BPU ID: NORMAL
BUN SERPL-MCNC: 8 MG/DL (ref 6–20)
BUN/CREAT SERPL: 11 (ref 12–20)
CALCIUM SERPL-MCNC: 9.1 MG/DL (ref 8.5–10.1)
CHLORIDE SERPL-SCNC: 109 MMOL/L (ref 97–108)
CO2 SERPL-SCNC: 37 MMOL/L (ref 21–32)
CREAT SERPL-MCNC: 0.72 MG/DL (ref 0.55–1.02)
CROSSMATCH RESULT,%XM: NORMAL
ERYTHROCYTE [DISTWIDTH] IN BLOOD BY AUTOMATED COUNT: 14.2 % (ref 11.5–14.5)
FERRITIN SERPL-MCNC: 35 NG/ML (ref 26–388)
GLOBULIN SER CALC-MCNC: 3.1 G/DL (ref 2–4)
GLUCOSE BLD STRIP.AUTO-MCNC: 67 MG/DL (ref 65–117)
GLUCOSE BLD STRIP.AUTO-MCNC: 68 MG/DL (ref 65–117)
GLUCOSE SERPL-MCNC: 69 MG/DL (ref 65–100)
HCT VFR BLD AUTO: 24.9 % (ref 35–47)
HGB BLD-MCNC: 8 G/DL (ref 11.5–16)
MAGNESIUM SERPL-MCNC: 1.9 MG/DL (ref 1.6–2.4)
MCH RBC QN AUTO: 29.5 PG (ref 26–34)
MCHC RBC AUTO-ENTMCNC: 32.1 G/DL (ref 30–36.5)
MCV RBC AUTO: 91.9 FL (ref 80–99)
NRBC # BLD: 0 K/UL (ref 0–0.01)
NRBC BLD-RTO: 0 PER 100 WBC
PHOSPHATE SERPL-MCNC: 2.2 MG/DL (ref 2.6–4.7)
PLATELET # BLD AUTO: 217 K/UL (ref 150–400)
PMV BLD AUTO: 10.7 FL (ref 8.9–12.9)
POTASSIUM SERPL-SCNC: 3.3 MMOL/L (ref 3.5–5.1)
PROT SERPL-MCNC: 5.3 G/DL (ref 6.4–8.2)
RBC # BLD AUTO: 2.71 M/UL (ref 3.8–5.2)
SERVICE CMNT-IMP: NORMAL
SERVICE CMNT-IMP: NORMAL
SODIUM SERPL-SCNC: 147 MMOL/L (ref 136–145)
SPECIMEN EXP DATE BLD: NORMAL
STATUS OF UNIT,%ST: NORMAL
UNIT DIVISION, %UDIV: 0
WBC # BLD AUTO: 7.6 K/UL (ref 3.6–11)

## 2022-10-06 PROCEDURE — C9113 INJ PANTOPRAZOLE SODIUM, VIA: HCPCS | Performed by: STUDENT IN AN ORGANIZED HEALTH CARE EDUCATION/TRAINING PROGRAM

## 2022-10-06 PROCEDURE — 65270000046 HC RM TELEMETRY

## 2022-10-06 PROCEDURE — 77010033678 HC OXYGEN DAILY

## 2022-10-06 PROCEDURE — 85027 COMPLETE CBC AUTOMATED: CPT

## 2022-10-06 PROCEDURE — 74011000250 HC RX REV CODE- 250: Performed by: INTERNAL MEDICINE

## 2022-10-06 PROCEDURE — 84100 ASSAY OF PHOSPHORUS: CPT

## 2022-10-06 PROCEDURE — 74011250636 HC RX REV CODE- 250/636: Performed by: STUDENT IN AN ORGANIZED HEALTH CARE EDUCATION/TRAINING PROGRAM

## 2022-10-06 PROCEDURE — 74011250637 HC RX REV CODE- 250/637: Performed by: STUDENT IN AN ORGANIZED HEALTH CARE EDUCATION/TRAINING PROGRAM

## 2022-10-06 PROCEDURE — 82962 GLUCOSE BLOOD TEST: CPT

## 2022-10-06 PROCEDURE — 83735 ASSAY OF MAGNESIUM: CPT

## 2022-10-06 PROCEDURE — 94640 AIRWAY INHALATION TREATMENT: CPT

## 2022-10-06 PROCEDURE — 74011250636 HC RX REV CODE- 250/636: Performed by: INTERNAL MEDICINE

## 2022-10-06 PROCEDURE — 94761 N-INVAS EAR/PLS OXIMETRY MLT: CPT

## 2022-10-06 PROCEDURE — 36415 COLL VENOUS BLD VENIPUNCTURE: CPT

## 2022-10-06 PROCEDURE — 74011250637 HC RX REV CODE- 250/637: Performed by: PHYSICIAN ASSISTANT

## 2022-10-06 PROCEDURE — 80053 COMPREHEN METABOLIC PANEL: CPT

## 2022-10-06 PROCEDURE — 82728 ASSAY OF FERRITIN: CPT

## 2022-10-06 PROCEDURE — 74011000250 HC RX REV CODE- 250: Performed by: STUDENT IN AN ORGANIZED HEALTH CARE EDUCATION/TRAINING PROGRAM

## 2022-10-06 PROCEDURE — 74011000258 HC RX REV CODE- 258: Performed by: INTERNAL MEDICINE

## 2022-10-06 RX ORDER — DEXTROSE MONOHYDRATE AND SODIUM CHLORIDE 5; .45 G/100ML; G/100ML
75 INJECTION, SOLUTION INTRAVENOUS CONTINUOUS
Status: DISCONTINUED | OUTPATIENT
Start: 2022-10-06 | End: 2022-10-07

## 2022-10-06 RX ORDER — SORBITOL SOLUTION 70 %
60 SOLUTION, ORAL MISCELLANEOUS
Status: COMPLETED | OUTPATIENT
Start: 2022-10-06 | End: 2022-10-06

## 2022-10-06 RX ORDER — HYDRALAZINE HYDROCHLORIDE 20 MG/ML
10 INJECTION INTRAMUSCULAR; INTRAVENOUS
Status: DISCONTINUED | OUTPATIENT
Start: 2022-10-06 | End: 2022-10-12 | Stop reason: HOSPADM

## 2022-10-06 RX ADMIN — AMLODIPINE BESYLATE 5 MG: 5 TABLET ORAL at 09:25

## 2022-10-06 RX ADMIN — FLUTICASONE PROPIONATE 2 SPRAY: 50 SPRAY, METERED NASAL at 09:41

## 2022-10-06 RX ADMIN — SORBITOL SOLUTION (BULK) 60 ML: 70 SOLUTION at 15:57

## 2022-10-06 RX ADMIN — LEVETIRACETAM 250 MG: 250 TABLET, FILM COATED ORAL at 18:41

## 2022-10-06 RX ADMIN — ARFORMOTEROL TARTRATE: 15 SOLUTION RESPIRATORY (INHALATION) at 19:19

## 2022-10-06 RX ADMIN — ATORVASTATIN CALCIUM 80 MG: 40 TABLET, FILM COATED ORAL at 09:25

## 2022-10-06 RX ADMIN — DEXTROSE AND SODIUM CHLORIDE 75 ML/HR: 5; 450 INJECTION, SOLUTION INTRAVENOUS at 11:00

## 2022-10-06 RX ADMIN — FERROUS SULFATE TAB 325 MG (65 MG ELEMENTAL FE) 325 MG: 325 (65 FE) TAB at 09:25

## 2022-10-06 RX ADMIN — SORBITOL SOLUTION (BULK) 60 ML: 70 SOLUTION at 14:58

## 2022-10-06 RX ADMIN — HYDRALAZINE HYDROCHLORIDE 10 MG: 20 INJECTION INTRAMUSCULAR; INTRAVENOUS at 16:20

## 2022-10-06 RX ADMIN — PIPERACILLIN AND TAZOBACTAM 3.38 G: 3; .375 INJECTION, POWDER, FOR SOLUTION INTRAVENOUS at 17:54

## 2022-10-06 RX ADMIN — LEVETIRACETAM 250 MG: 250 TABLET, FILM COATED ORAL at 09:25

## 2022-10-06 RX ADMIN — GABAPENTIN 100 MG: 100 CAPSULE ORAL at 09:25

## 2022-10-06 RX ADMIN — SODIUM CHLORIDE, PRESERVATIVE FREE 40 MG: 5 INJECTION INTRAVENOUS at 22:00

## 2022-10-06 RX ADMIN — ARFORMOTEROL TARTRATE: 15 SOLUTION RESPIRATORY (INHALATION) at 07:44

## 2022-10-06 RX ADMIN — LEVOTHYROXINE SODIUM 125 MCG: 0.12 TABLET ORAL at 09:25

## 2022-10-06 RX ADMIN — GABAPENTIN 100 MG: 100 CAPSULE ORAL at 16:36

## 2022-10-06 RX ADMIN — GABAPENTIN 100 MG: 100 CAPSULE ORAL at 22:13

## 2022-10-06 RX ADMIN — POTASSIUM PHOSPHATE, MONOBASIC AND POTASSIUM PHOSPHATE, DIBASIC: 224; 236 INJECTION, SOLUTION, CONCENTRATE INTRAVENOUS at 11:00

## 2022-10-06 RX ADMIN — SORBITOL SOLUTION (BULK) 60 ML: 70 SOLUTION at 13:57

## 2022-10-06 RX ADMIN — CETIRIZINE HYDROCHLORIDE 10 MG: 10 TABLET, FILM COATED ORAL at 09:25

## 2022-10-06 RX ADMIN — FERROUS SULFATE TAB 325 MG (65 MG ELEMENTAL FE) 325 MG: 325 (65 FE) TAB at 16:36

## 2022-10-06 RX ADMIN — SODIUM CHLORIDE, PRESERVATIVE FREE 40 MG: 5 INJECTION INTRAVENOUS at 09:25

## 2022-10-06 NOTE — PROGRESS NOTES
Speech Pathology Note    Chart reviewed. Note patient NPO for EGD on this date. Patient not appropriate for PO trials on this date. Will defer SLP treatment and follow up once patient is cleared for PO, per GI. Thank you.     Don Zavala M.S., CCC-SLP

## 2022-10-06 NOTE — PROGRESS NOTES
Hospitalist Progress Note    NAME: Germania Toussaint   :  1928   MRN:  [id number]       Assessment / Plan:  Acute blood loss anemia, likely secondary to gastrointestinal hemorrhage  -GI consulted  -Trend H&H  -s/p 2 units PRBC, goal hgb > 7  -Protonix IV twice daily  -ok for clears for now as EGD/ colon delayed as patient has not had a BM  -Received 2 units PRBC. Hgb now 8. Monitor  -Hold eliquis, appears it had been resumed from recent DC  -Patient with BECKY, Will hold off on exogenous iron given she is receiving blood    Recent CVA  S/p TNK  Unable to undergo MRI due to pacer  C/w high intensity statin  Remains on keppra for seizure ppx  Diet puree with thin liquids when able to tolerate PO  SLP consulted    Hypernatremia  Na 147  Now started D5 1/2 NS  Monitor    PAF  S/p PPM  Holding eliquis    Acute kidney injury   -c/w light hydration  -Cr improving  -Likely from decreased volume from presumed GIB    Hypertension  -Continue home Norvasc    Hyperlipidemia  -Continue home atorvastatin    Hypothyroidism  -Continue home Synthroid    Allergies  -Continue home Claritin, fluticasone and Symbicort    PPX SCD    Code status: Full       Subjective:     Chief Complaint / Reason for Physician Visit  Patient seen and examined at the bedside. Completed the bowel prep but has not had a BM. She currently has no complaints. Review of Systems:  Symptom Y/N Comments  Symptom Y/N Comments   Fever/Chills    Chest Pain     Poor Appetite    Edema     Cough    Abdominal Pain     Sputum    Joint Pain     SOB/HAWKINS    Pruritis/Rash     Nausea/vomit    Tolerating PT/OT     Diarrhea    Tolerating Diet     Constipation    Other       Could NOT obtain due to:      Objective:     VITALS:   Last 24hrs VS reviewed since prior progress note.  Most recent are:  Patient Vitals for the past 24 hrs:   Temp Pulse Resp BP SpO2   10/06/22 0817 97.3 °F (36.3 °C) 64 19 (!) 184/86 95 %   10/06/22 0746 -- -- -- -- 95 %   10/05/22 2302 -- -- -- -- 100 %   10/05/22 2232 97.8 °F (36.6 °C) 61 16 (!) 176/84 100 %   10/05/22 1645 98.6 °F (37 °C) 62 16 (!) 158/76 98 %   10/05/22 1450 97.9 °F (36.6 °C) 60 19 (!) 151/82 100 %   10/05/22 1102 -- 66 -- (!) 176/82 100 %   10/05/22 1100 -- 62 14 (!) 180/99 --         Intake/Output Summary (Last 24 hours) at 10/6/2022 1054  Last data filed at 10/6/2022 0310  Gross per 24 hour   Intake --   Output 1400 ml   Net -1400 ml          I had a face to face encounter and independently examined this patient on 10/6/2022, as outlined below:  PHYSICAL EXAM:  General: WD, WN. Alert, cooperative, no acute distress    EENT:  EOMI. Anicteric sclerae. MMM  Resp:  CTA bilaterally, no wheezing or rales. No accessory muscle use  CV:  Regular  rhythm,  No edema  GI:  Soft, Non distended, Non tender. +Bowel sounds  Neurologic:  Alert and oriented X 3, normal speech,   Psych:   Good insight. Not anxious nor agitated  Skin:  No rashes. No jaundice    Reviewed most current lab test results and cultures  YES  Reviewed most current radiology test results   YES  Review and summation of old records today    NO  Reviewed patient's current orders and MAR    YES  PMH/SH reviewed - no change compared to H&P  ________________________________________________________________________  Care Plan discussed with:    Comments   Patient     Family      RN     Care Manager     Consultant                        Multidiciplinary team rounds were held today with , nursing, pharmacist and clinical coordinator. Patient's plan of care was discussed; medications were reviewed and discharge planning was addressed.      ________________________________________________________________________  Total NON critical care TIME:  40  Minutes    Total CRITICAL CARE TIME Spent:   Minutes non procedure based      Comments   >50% of visit spent in counseling and coordination of care ________________________________________________________________________  Jamie Goldstein MD     Procedures: see electronic medical records for all procedures/Xrays and details which were not copied into this note but were reviewed prior to creation of Plan. LABS:  I reviewed today's most current labs and imaging studies. Pertinent labs include:  Recent Labs     10/06/22  0248 10/05/22  1419 10/05/22  1028 10/05/22  0338 10/05/22  0250 10/04/22  1720   WBC 7.6  --   --  5.9  --  8.8   HGB 8.0* 8.0* 8.3* 6.3*   < > 7.3*   HCT 24.9* 25.3* 26.3* 20.5*   < > 23.2*     --   --  215  --  238    < > = values in this interval not displayed.        Recent Labs     10/06/22  0248 10/05/22  0338 10/04/22  1720   * 144 143   K 3.3* 3.8 3.7   * 108 105   CO2 37* 34* 36*   GLU 69 94 130*   BUN 8 11 11   CREA 0.72 0.94 1.09*   CA 9.1 9.1 9.3   MG 1.9  --   --    PHOS 2.2*  --   --    ALB 2.2* 2.1* 2.5*   TBILI 1.4* 0.5 0.7   * 114* 132*         Signed: Jamie Goldstein MD

## 2022-10-06 NOTE — PROGRESS NOTES
Transition of Care Plan:    RUR: 20%  Disposition: Home with 24/7 family assist and CLEMENTE with [facility] - [facility]  Follow up appointments:Pcp/Specialist  DME needed:none  Transportation at Discharge: daughter  [address] or means to access home:   family     IM Medicare Letter:will need prior to discharge  Is patient a [place] and connected with the [place]? N/a  If yes, was [employer] transfer form completed and [facility] notified? Caregiver Contact:     Demetris Tammy daughter [phone]  Maude Escobedo daughter  [phone]  Discharge Caregiver contacted prior to discharge? yes  Care Conference needed?:         no    CM spoke with Ellen daughter and confirmed patient has 24/7 support of family at home and family wants to continue services with [facility]# [phone] NGE#[phone]. Will need [facility] CLEMENTE upon discharge to sent to agency. Family will transport.  ELAINE Jessica

## 2022-10-06 NOTE — PROGRESS NOTES
PT visit attempted pt reported she was fine where she is right now. Will defer at this time and continue to follow.

## 2022-10-06 NOTE — PROGRESS NOTES
Report received from Rosaura Leonard RN. Nurse reported patient only had 3/4 of prep and is currently drinking a cup of it now. No bowel movement at this point.  Contacted/ informed Noemi Bolanos RN

## 2022-10-06 NOTE — PROGRESS NOTES
Gastroenterology Daily Progress Note   [facility] for Dr. Ann Murcia)   [address] [place] [address] Dr Boyer Date: 10/4/2022     Follow up of gib    Subjective:       Drank most of golytely bowel prep but never had a BM  Was still drinking bowel prep this AM when I saw her and not NPO    Hgb stable at 8.0  BP elevated 184/86    Current Facility-Administered Medications   Medication Dose Route Frequency    dextrose 5 % - 0.45% NaCl infusion  75 mL/hr IntraVENous CONTINUOUS    potassium phosphate 30 mmol in 0.9% sodium chloride 500 mL infusion   IntraVENous ONCE    pantoprazole (PROTONIX) 40 mg in 0.9% sodium chloride 10 mL injection  40 mg IntraVENous Q12H    0.9% sodium chloride infusion 250 mL  250 mL IntraVENous PRN    ondansetron (ZOFRAN) injection 4 mg  4 mg IntraVENous Q4H PRN    0.9% sodium chloride infusion 250 mL  250 mL IntraVENous PRN    ferrous sulfate tablet 325 mg  325 mg Oral BID WITH MEALS    fluticasone propionate (FLONASE) 50 mcg/actuation nasal spray 2 Spray  2 Spray Both Nostrils DAILY    gabapentin (NEURONTIN) capsule 100 mg  100 mg Oral TID    levothyroxine (SYNTHROID) tablet 125 mcg  125 mcg Oral DAILY    amLODIPine (NORVASC) tablet 5 mg  5 mg Oral DAILY    atorvastatin (LIPITOR) tablet 80 mg  80 mg Oral DAILY    levETIRAcetam (KEPPRA) tablet 250 mg  250 mg Oral BID    arformoterol 15 mcg/budesonide 0.5 mg neb solution   Nebulization BID RT    cetirizine (ZYRTEC) tablet 10 mg  10 mg Oral DAILY    acetaminophen (TYLENOL) tablet 650 mg  650 mg Oral Q6H PRN        Objective:     Visit Vitals  BP (!) 184/86   Pulse 64   Temp 97.3 °F (36.3 °C)   Resp 19   Ht 5' 6\" (1.676 m)   Wt 75.9 kg (167 lb 5.3 oz)   SpO2 95%   BMI 27.01 kg/m²   Blood pressure (!) 184/86, pulse 64, temperature 97.3 °F (36.3 °C), resp. rate 19, height 5' 6\" (1.676 m), weight 75.9 kg (167 lb 5.3 oz), SpO2 95 %.     10/06 0701 - 10/06 1900  In: -   Out: 300 [Urine:300]    10/04 1901 - 10/06 0700  In: 221 [I.V.:550]  Out: 1400 [Urine:1400]      Intake/Output Summary (Last 24 hours) at 10/6/2022 1010  Last data filed at 10/6/2022 0721  Gross per 24 hour   Intake --   Output 1400 ml   Net -1400 ml         Physical Exam:       General: black female pleasantly confused in nad  Chest:  CTA, No rhonchi, rales or rubs. Heart: S1, S2, RRR  GI: Soft, NT, ND + bowel sounds  Rectum: dark liquid stool in rectum, no mass or fecal limpaction  Extremities: no edema  CNS: oriented to self only       Labs:       Recent Results (from the past 24 hour(s))   HGB & HCT    Collection Time: 10/05/22 10:28 AM   Result Value Ref Range    HGB 8.3 (L) 11.5 - 16.0 g/dL    HCT 26.3 (L) 35.0 - 47.0 %   HGB & HCT    Collection Time: 10/05/22  2:19 PM   Result Value Ref Range    HGB 8.0 (L) 11.5 - 16.0 g/dL    HCT 25.3 (L) 35.0 - 47.0 %   CBC W/O DIFF    Collection Time: 10/06/22  2:48 AM   Result Value Ref Range    WBC 7.6 3.6 - 11.0 K/uL    RBC 2.71 (L) 3.80 - 5.20 M/uL    HGB 8.0 (L) 11.5 - 16.0 g/dL    HCT 24.9 (L) 35.0 - 47.0 %    MCV 91.9 80.0 - 99.0 FL    MCH 29.5 26.0 - 34.0 PG    MCHC 32.1 30.0 - 36.5 g/dL    RDW 14.2 11.5 - 14.5 %    PLATELET 712 918 - 041 K/uL    MPV 10.7 8.9 - 12.9 FL    NRBC 0.0 0  WBC    ABSOLUTE NRBC 0.00 0.00 - 1.42 K/uL   METABOLIC PANEL, COMPREHENSIVE    Collection Time: 10/06/22  2:48 AM   Result Value Ref Range    Sodium 147 (H) 136 - 145 mmol/L    Potassium 3.3 (L) 3.5 - 5.1 mmol/L    Chloride 109 (H) 97 - 108 mmol/L    CO2 37 (H) 21 - 32 mmol/L    Anion gap 1 (L) 5 - 15 mmol/L    Glucose 69 65 - 100 mg/dL    BUN 8 6 - 20 MG/DL    Creatinine 0.72 0.55 - 1.02 MG/DL    BUN/Creatinine ratio 11 (L) 12 - 20      eGFR >60 >60 ml/min/1.73m2    Calcium 9.1 8.5 - 10.1 MG/DL    Bilirubin, total 1.4 (H) 0.2 - 1.0 MG/DL    ALT (SGPT) 136 (H) 12 - 78 U/L    AST (SGOT) 142 (H) 15 - 37 U/L    Alk.  phosphatase 77 45 - 117 U/L    Protein, total 5.3 (L) 6.4 - 8.2 g/dL    Albumin 2.2 (L) 3.5 - 5.0 g/dL    Globulin 3.1 2.0 - 4.0 g/dL    A-G Ratio 0.7 (L) 1.1 - 2.2     FERRITIN    Collection Time: 10/06/22  2:48 AM   Result Value Ref Range    Ferritin 35 26 - 388 NG/ML   MAGNESIUM    Collection Time: 10/06/22  2:48 AM   Result Value Ref Range    Magnesium 1.9 1.6 - 2.4 mg/dL   PHOSPHORUS    Collection Time: 10/06/22  2:48 AM   Result Value Ref Range    Phosphorus 2.2 (L) 2.6 - 4.7 MG/DL   GLUCOSE, POC    Collection Time: 10/06/22  7:49 AM   Result Value Ref Range    Glucose (POC) 68 65 - 117 mg/dL    Performed by Eric BARRON    LABRCNT(wbc:2,hgb:2,hct:2,plt:2,)  Recent Labs     10/06/22  0248 10/05/22  0338 10/04/22  1720   * 144 143   K 3.3* 3.8 3.7   * 108 105   CO2 37* 34* 36*   BUN 8 11 11   CREA 0.72 0.94 1.09*   GLU 69 94 130*   CA 9.1 9.1 9.3   MG 1.9  --   --    PHOS 2.2*  --   --    LABRCNT(sgot:3,gpt:3,ap:3,tbiL:3,TP:3,ALB:3,GLOB:3,ggt:3,aml:3,amyp:3,lpse:3,hlpse:3)No results for input(s): INR, PTP, APTT, INREXT in the last 72 hours. Recent Labs     10/06/22  0248 10/05/22  0338 10/04/22  1720   AP 77 78 100   TP 5.3* 5.5* 6.3*   ALB 2.2* 2.1* 2.5*   GLOB 3.1 3.4 3.8   BRIEFLAB(B12,FOL,FOLAT,RBCF)  Lab Results   Component Value Date/Time    Folate 5.2 10/05/2022 02:50 AM   LABRCNT(CPK:3,CpKMB:3,ckndx:3,troiq:3)No components found for: GLPOCBRIEFLAB(CHOL,CHOLX,CHOLP,CHLST,CHOLV,HDL,HDLC,HDLP,LDL,DLDL,LDLC,DLDLP,TGL,TGLX,TRIGL,TRIGP,CHHD,CHHDX)No results for input(s): PH, PCO2, PO2 in the last 72 hours. LABRCNT(CPK:3,CpKMB:3,ckndx:3,troiq:3)[facility]  No results for input(s): CPK, CKNDX, TROIQ in the last 72 hours.     No lab exists for component: [facility]      Problem List:     Active Problems:    Arthritis ()      Lumbar stenosis (3/13/2012)      Ischemic stroke ([facility] (9/24/2022)      Blood loss anemia (10/4/2022)      GIB (gastrointestinal bleeding) (10/4/2022)      [facility] (10/4/2022)        Impression:  Acute blood loss Anemia  Melena  Anticoagulation on hold  CVA  HTN  AMS         Plan:  Patient is not prepped for colon and is not NPO for EGD. Procedures must be canceled and rescheduled. Discussed with RN and Endo staff and updated her daughter Sola Quach. Pending anesthesia availability, will be rescheduled for either tomorrow or Monday, 10/10/22. Hgb is stable with no active bleeding. Eliquis remains on hold. I discussed HTN with RN and asked her to recheck BP and discuss with hospitalist as she is high risk for recurrent CVA while off anticoagulation. [facility]    10/6/2022  [id number] [facility], [address] [place] [address] [place]: [phone]    Addendum: We have scheduled the EGD/Colon for tomorrow AM.  I updated patient's daughter. Should remain on CLD today and NPO after MN. I will give sorbitol prep. If not having BM's, I advised the RN to call GI. May need some enemas to get her started. Follow hgb.       [facility]  10/06/22  12:10 PM

## 2022-10-06 NOTE — PROGRESS NOTES
Occupational Therapy    Chart reviewed, attempted to see, patient attempting to complete bowel prep for EGD, will defer and follow up as able. Ni Valencia.  MS Brandi OTR/L

## 2022-10-07 LAB
ANION GAP SERPL CALC-SCNC: 1 MMOL/L (ref 5–15)
BASOPHILS # BLD: 0 K/UL (ref 0–0.1)
BASOPHILS NFR BLD: 1 % (ref 0–1)
BUN SERPL-MCNC: 7 MG/DL (ref 6–20)
BUN/CREAT SERPL: 8 (ref 12–20)
CALCIUM SERPL-MCNC: 9.2 MG/DL (ref 8.5–10.1)
CHLORIDE SERPL-SCNC: 118 MMOL/L (ref 97–108)
CO2 SERPL-SCNC: 35 MMOL/L (ref 21–32)
CREAT SERPL-MCNC: 0.93 MG/DL (ref 0.55–1.02)
DIFFERENTIAL METHOD BLD: ABNORMAL
EOSINOPHIL # BLD: 0.2 K/UL (ref 0–0.4)
EOSINOPHIL NFR BLD: 3 % (ref 0–7)
ERYTHROCYTE [DISTWIDTH] IN BLOOD BY AUTOMATED COUNT: 14.6 % (ref 11.5–14.5)
GLUCOSE SERPL-MCNC: 113 MG/DL (ref 65–100)
HCT VFR BLD AUTO: 25.2 % (ref 35–47)
HGB BLD-MCNC: 7.9 G/DL (ref 11.5–16)
IMM GRANULOCYTES # BLD AUTO: 0 K/UL (ref 0–0.04)
IMM GRANULOCYTES NFR BLD AUTO: 0 % (ref 0–0.5)
LYMPHOCYTES # BLD: 0.9 K/UL (ref 0.8–3.5)
LYMPHOCYTES NFR BLD: 14 % (ref 12–49)
MAGNESIUM SERPL-MCNC: 2.1 MG/DL (ref 1.6–2.4)
MCH RBC QN AUTO: 28.6 PG (ref 26–34)
MCHC RBC AUTO-ENTMCNC: 31.3 G/DL (ref 30–36.5)
MCV RBC AUTO: 91.3 FL (ref 80–99)
MONOCYTES # BLD: 0.7 K/UL (ref 0–1)
MONOCYTES NFR BLD: 11 % (ref 5–13)
NEUTS SEG # BLD: 4.5 K/UL (ref 1.8–8)
NEUTS SEG NFR BLD: 71 % (ref 32–75)
NRBC # BLD: 0 K/UL (ref 0–0.01)
NRBC BLD-RTO: 0 PER 100 WBC
PHOSPHATE SERPL-MCNC: 2.8 MG/DL (ref 2.6–4.7)
PLATELET # BLD AUTO: 229 K/UL (ref 150–400)
PMV BLD AUTO: 10.8 FL (ref 8.9–12.9)
POTASSIUM SERPL-SCNC: 3 MMOL/L (ref 3.5–5.1)
RBC # BLD AUTO: 2.76 M/UL (ref 3.8–5.2)
SODIUM SERPL-SCNC: 154 MMOL/L (ref 136–145)
WBC # BLD AUTO: 6.3 K/UL (ref 3.6–11)

## 2022-10-07 PROCEDURE — 74011250637 HC RX REV CODE- 250/637: Performed by: INTERNAL MEDICINE

## 2022-10-07 PROCEDURE — 88342 IMHCHEM/IMCYTCHM 1ST ANTB: CPT

## 2022-10-07 PROCEDURE — 80048 BASIC METABOLIC PNL TOTAL CA: CPT

## 2022-10-07 PROCEDURE — 74011000250 HC RX REV CODE- 250: Performed by: ANESTHESIOLOGY

## 2022-10-07 PROCEDURE — 0DB68ZX EXCISION OF STOMACH, VIA NATURAL OR ARTIFICIAL OPENING ENDOSCOPIC, DIAGNOSTIC: ICD-10-PCS | Performed by: INTERNAL MEDICINE

## 2022-10-07 PROCEDURE — 77030003657 HC NDL SCLER BSC -B: Performed by: INTERNAL MEDICINE

## 2022-10-07 PROCEDURE — 74011250636 HC RX REV CODE- 250/636: Performed by: STUDENT IN AN ORGANIZED HEALTH CARE EDUCATION/TRAINING PROGRAM

## 2022-10-07 PROCEDURE — 74011250637 HC RX REV CODE- 250/637: Performed by: STUDENT IN AN ORGANIZED HEALTH CARE EDUCATION/TRAINING PROGRAM

## 2022-10-07 PROCEDURE — 84100 ASSAY OF PHOSPHORUS: CPT

## 2022-10-07 PROCEDURE — 2709999900 HC NON-CHARGEABLE SUPPLY: Performed by: INTERNAL MEDICINE

## 2022-10-07 PROCEDURE — C9113 INJ PANTOPRAZOLE SODIUM, VIA: HCPCS | Performed by: STUDENT IN AN ORGANIZED HEALTH CARE EDUCATION/TRAINING PROGRAM

## 2022-10-07 PROCEDURE — 83735 ASSAY OF MAGNESIUM: CPT

## 2022-10-07 PROCEDURE — 76040000007: Performed by: INTERNAL MEDICINE

## 2022-10-07 PROCEDURE — 74011000258 HC RX REV CODE- 258: Performed by: INTERNAL MEDICINE

## 2022-10-07 PROCEDURE — 99222 1ST HOSP IP/OBS MODERATE 55: CPT | Performed by: NURSE PRACTITIONER

## 2022-10-07 PROCEDURE — 77010033678 HC OXYGEN DAILY

## 2022-10-07 PROCEDURE — 77030019988 HC FCPS ENDOSC DISP BSC -B: Performed by: INTERNAL MEDICINE

## 2022-10-07 PROCEDURE — 77030038665 HC SOL ELEVIEW INJ AGNT ARPH -B: Performed by: INTERNAL MEDICINE

## 2022-10-07 PROCEDURE — 0DBN8ZX EXCISION OF SIGMOID COLON, VIA NATURAL OR ARTIFICIAL OPENING ENDOSCOPIC, DIAGNOSTIC: ICD-10-PCS | Performed by: INTERNAL MEDICINE

## 2022-10-07 PROCEDURE — 85025 COMPLETE CBC W/AUTO DIFF WBC: CPT

## 2022-10-07 PROCEDURE — 76060000032 HC ANESTHESIA 0.5 TO 1 HR: Performed by: INTERNAL MEDICINE

## 2022-10-07 PROCEDURE — 94761 N-INVAS EAR/PLS OXIMETRY MLT: CPT

## 2022-10-07 PROCEDURE — 88305 TISSUE EXAM BY PATHOLOGIST: CPT

## 2022-10-07 PROCEDURE — 77030021593 HC FCPS BIOP ENDOSC BSC -A: Performed by: INTERNAL MEDICINE

## 2022-10-07 PROCEDURE — 74011000250 HC RX REV CODE- 250: Performed by: STUDENT IN AN ORGANIZED HEALTH CARE EDUCATION/TRAINING PROGRAM

## 2022-10-07 PROCEDURE — 74011250636 HC RX REV CODE- 250/636: Performed by: INTERNAL MEDICINE

## 2022-10-07 PROCEDURE — 36415 COLL VENOUS BLD VENIPUNCTURE: CPT

## 2022-10-07 PROCEDURE — 74011250636 HC RX REV CODE- 250/636: Performed by: ANESTHESIOLOGY

## 2022-10-07 PROCEDURE — 65270000046 HC RM TELEMETRY

## 2022-10-07 PROCEDURE — 74011250636 HC RX REV CODE- 250/636: Performed by: NURSE PRACTITIONER

## 2022-10-07 RX ORDER — ATROPINE SULFATE 0.1 MG/ML
0.5 INJECTION INTRAVENOUS
Status: DISCONTINUED | OUTPATIENT
Start: 2022-10-07 | End: 2022-10-07 | Stop reason: HOSPADM

## 2022-10-07 RX ORDER — POTASSIUM CHLORIDE 7.45 MG/ML
10 INJECTION INTRAVENOUS
Status: DISPENSED | OUTPATIENT
Start: 2022-10-07 | End: 2022-10-07

## 2022-10-07 RX ORDER — DEXTROMETHORPHAN/PSEUDOEPHED 2.5-7.5/.8
1.2 DROPS ORAL
Status: DISCONTINUED | OUTPATIENT
Start: 2022-10-07 | End: 2022-10-07 | Stop reason: HOSPADM

## 2022-10-07 RX ORDER — DIPHENHYDRAMINE HYDROCHLORIDE 50 MG/ML
50 INJECTION, SOLUTION INTRAMUSCULAR; INTRAVENOUS ONCE
Status: DISCONTINUED | OUTPATIENT
Start: 2022-10-07 | End: 2022-10-07 | Stop reason: HOSPADM

## 2022-10-07 RX ORDER — EPINEPHRINE 0.1 MG/ML
1 INJECTION INTRACARDIAC; INTRAVENOUS
Status: DISCONTINUED | OUTPATIENT
Start: 2022-10-07 | End: 2022-10-07 | Stop reason: HOSPADM

## 2022-10-07 RX ORDER — PROPOFOL 10 MG/ML
INJECTION, EMULSION INTRAVENOUS AS NEEDED
Status: DISCONTINUED | OUTPATIENT
Start: 2022-10-07 | End: 2022-10-07 | Stop reason: HOSPADM

## 2022-10-07 RX ORDER — LIDOCAINE HYDROCHLORIDE 20 MG/ML
INJECTION, SOLUTION EPIDURAL; INFILTRATION; INTRACAUDAL; PERINEURAL AS NEEDED
Status: DISCONTINUED | OUTPATIENT
Start: 2022-10-07 | End: 2022-10-07 | Stop reason: HOSPADM

## 2022-10-07 RX ORDER — DEXTROSE MONOHYDRATE 50 MG/ML
125 INJECTION, SOLUTION INTRAVENOUS CONTINUOUS
Status: DISCONTINUED | OUTPATIENT
Start: 2022-10-07 | End: 2022-10-12 | Stop reason: HOSPADM

## 2022-10-07 RX ORDER — MIDAZOLAM HYDROCHLORIDE 1 MG/ML
.25-5 INJECTION, SOLUTION INTRAMUSCULAR; INTRAVENOUS
Status: DISCONTINUED | OUTPATIENT
Start: 2022-10-07 | End: 2022-10-07 | Stop reason: HOSPADM

## 2022-10-07 RX ORDER — SODIUM CHLORIDE 9 MG/ML
125 INJECTION, SOLUTION INTRAVENOUS CONTINUOUS
Status: DISPENSED | OUTPATIENT
Start: 2022-10-07 | End: 2022-10-07

## 2022-10-07 RX ORDER — FLUMAZENIL 0.1 MG/ML
0.2 INJECTION INTRAVENOUS
Status: DISCONTINUED | OUTPATIENT
Start: 2022-10-07 | End: 2022-10-07 | Stop reason: HOSPADM

## 2022-10-07 RX ORDER — NALOXONE HYDROCHLORIDE 0.4 MG/ML
0.4 INJECTION, SOLUTION INTRAMUSCULAR; INTRAVENOUS; SUBCUTANEOUS
Status: DISCONTINUED | OUTPATIENT
Start: 2022-10-07 | End: 2022-10-07 | Stop reason: HOSPADM

## 2022-10-07 RX ORDER — SODIUM CHLORIDE 9 MG/ML
25 INJECTION, SOLUTION INTRAVENOUS CONTINUOUS
Status: DISCONTINUED | OUTPATIENT
Start: 2022-10-07 | End: 2022-10-10

## 2022-10-07 RX ADMIN — LEVETIRACETAM 250 MG: 250 TABLET, FILM COATED ORAL at 17:01

## 2022-10-07 RX ADMIN — CETIRIZINE HYDROCHLORIDE 10 MG: 10 TABLET, FILM COATED ORAL at 08:32

## 2022-10-07 RX ADMIN — PROPOFOL 10 MG: 10 INJECTION, EMULSION INTRAVENOUS at 10:27

## 2022-10-07 RX ADMIN — FLUTICASONE PROPIONATE 2 SPRAY: 50 SPRAY, METERED NASAL at 08:51

## 2022-10-07 RX ADMIN — PIPERACILLIN AND TAZOBACTAM 3.38 G: 3; .375 INJECTION, POWDER, FOR SOLUTION INTRAVENOUS at 08:38

## 2022-10-07 RX ADMIN — PIPERACILLIN AND TAZOBACTAM 3.38 G: 3; .375 INJECTION, POWDER, FOR SOLUTION INTRAVENOUS at 01:35

## 2022-10-07 RX ADMIN — SIMETHICONE 80 MG: 20 SUSPENSION/ DROPS ORAL at 10:35

## 2022-10-07 RX ADMIN — SODIUM CHLORIDE 25 ML/HR: 9 INJECTION, SOLUTION INTRAVENOUS at 09:39

## 2022-10-07 RX ADMIN — POTASSIUM CHLORIDE 10 MEQ: 7.46 INJECTION, SOLUTION INTRAVENOUS at 06:11

## 2022-10-07 RX ADMIN — PROPOFOL 10 MG: 10 INJECTION, EMULSION INTRAVENOUS at 09:55

## 2022-10-07 RX ADMIN — ATORVASTATIN CALCIUM 80 MG: 40 TABLET, FILM COATED ORAL at 08:32

## 2022-10-07 RX ADMIN — PROPOFOL 30 MG: 10 INJECTION, EMULSION INTRAVENOUS at 10:03

## 2022-10-07 RX ADMIN — LEVETIRACETAM 250 MG: 250 TABLET, FILM COATED ORAL at 08:26

## 2022-10-07 RX ADMIN — FERROUS SULFATE TAB 325 MG (65 MG ELEMENTAL FE) 325 MG: 325 (65 FE) TAB at 08:29

## 2022-10-07 RX ADMIN — GABAPENTIN 100 MG: 100 CAPSULE ORAL at 21:10

## 2022-10-07 RX ADMIN — SODIUM CHLORIDE 25 ML/HR: 9 INJECTION, SOLUTION INTRAVENOUS at 21:16

## 2022-10-07 RX ADMIN — POTASSIUM CHLORIDE 10 MEQ: 7.46 INJECTION, SOLUTION INTRAVENOUS at 08:37

## 2022-10-07 RX ADMIN — AMLODIPINE BESYLATE 5 MG: 5 TABLET ORAL at 08:30

## 2022-10-07 RX ADMIN — DEXTROSE MONOHYDRATE 125 ML/HR: 50 INJECTION, SOLUTION INTRAVENOUS at 15:06

## 2022-10-07 RX ADMIN — POTASSIUM CHLORIDE 10 MEQ: 7.46 INJECTION, SOLUTION INTRAVENOUS at 07:20

## 2022-10-07 RX ADMIN — SODIUM CHLORIDE, PRESERVATIVE FREE 40 MG: 5 INJECTION INTRAVENOUS at 21:10

## 2022-10-07 RX ADMIN — GABAPENTIN 100 MG: 100 CAPSULE ORAL at 08:25

## 2022-10-07 RX ADMIN — FERROUS SULFATE TAB 325 MG (65 MG ELEMENTAL FE) 325 MG: 325 (65 FE) TAB at 17:01

## 2022-10-07 RX ADMIN — PROPOFOL 10 MG: 10 INJECTION, EMULSION INTRAVENOUS at 10:18

## 2022-10-07 RX ADMIN — GABAPENTIN 100 MG: 100 CAPSULE ORAL at 17:00

## 2022-10-07 RX ADMIN — LEVOTHYROXINE SODIUM 125 MCG: 0.12 TABLET ORAL at 08:31

## 2022-10-07 RX ADMIN — LIDOCAINE HYDROCHLORIDE 60 MG: 20 INJECTION, SOLUTION EPIDURAL; INFILTRATION; INTRACAUDAL; PERINEURAL at 09:55

## 2022-10-07 RX ADMIN — PIPERACILLIN AND TAZOBACTAM 3.38 G: 3; .375 INJECTION, POWDER, FOR SOLUTION INTRAVENOUS at 17:01

## 2022-10-07 RX ADMIN — SODIUM CHLORIDE, PRESERVATIVE FREE 40 MG: 5 INJECTION INTRAVENOUS at 08:37

## 2022-10-07 NOTE — PROGRESS NOTES
Bedside shift change report given to [address] Delgado (oncoming nurse) by [facility] RN (offgoing nurse). Report included the following information SBAR, Kardex, Intake/Output, and Cardiac Rhythm A-paced .

## 2022-10-07 NOTE — PROGRESS NOTES
Speech Pathology Note    Chart reviewed. Patient currently DAYAN for EGD. Will defer and continue to follow. Thank you.     Ad Johnson M.S., CCC-SLP

## 2022-10-07 NOTE — PROGRESS NOTES
Occupational Therapy    Pt off the floor for procedure. Will defer now and follow up as appropriate.      Glenys Douglass, OT

## 2022-10-07 NOTE — PROGRESS NOTES
Hospitalist Progress Note    NAME: Latricia Flores   :  1928   MRN:  [id number]       Assessment / Plan:  Acute blood loss anemia, likely secondary to gastrointestinal hemorrhage  -GI consulted  -Trend H&H  -s/p 2 units PRBC, goal hgb > 7  -Protonix IV twice daily  -Received 2 units PRBC. Hgb remains stable at 7.9  Monitor  -Hold eliquis, appears it had been resumed from recent DC  -Patient with BECKY, Will hold off on exogenous iron given she is receiving blood  -EGD/Colon showed a colonic mass which is suspicious for cancer  -Palliative consulted  -Remain off AC    Recent CVA  S/p TNK  Unable to undergo MRI due to pacer  C/w high intensity statin  Remains on keppra for seizure ppx  Diet puree with thin liquids when able to tolerate PO  SLP consulted    Hypernatremia  Na 154 now. Start on D5W  Monitor    PAF  S/p PPM  Holding eliquis    Acute kidney injury   -c/w light hydration  -Cr improving  -Likely from decreased volume from presumed GIB    Hypertension  -Continue home Norvasc    Hyperlipidemia  -Continue home atorvastatin    Hypothyroidism  -Continue home Synthroid    Allergies  -Continue home Claritin, fluticasone and Symbicort    PPX SCD    Code status: Full       Subjective:     Chief Complaint / Reason for Physician Visit  Patient seen and examined after her EGD colonoscopy. She is still slightly out of it. She is arousable and can answer some simple questions but does not fully comprehend that she has a colonic mass. She currently has no physical complaints she currently has no complaints.         Review of Systems:  Symptom Y/N Comments  Symptom Y/N Comments   Fever/Chills    Chest Pain     Poor Appetite    Edema     Cough    Abdominal Pain     Sputum    Joint Pain     SOB/HAWKINS    Pruritis/Rash     Nausea/vomit    Tolerating PT/OT     Diarrhea    Tolerating Diet     Constipation    Other       Could NOT obtain due to:      Objective:     VITALS:   Last 24hrs VS reviewed since prior progress note. Most recent are:  Patient Vitals for the past 24 hrs:   Temp Pulse Resp BP SpO2   10/07/22 1223 97.6 °F (36.4 °C) 60 18 (!) 192/90 100 %   10/07/22 1118 -- 63 20 (!) 188/83 99 %   10/07/22 1115 -- 65 19 (!) 197/84 99 %   10/07/22 1109 -- 66 21 (!) 200/90 99 %   10/07/22 1107 97.4 °F (36.3 °C) 77 20 (!) 205/90 93 %   10/07/22 1052 -- 66 21 (!) 179/88 98 %   10/07/22 0925 -- 66 20 (!) 177/92 99 %   10/07/22 0815 -- -- -- -- 99 %   10/07/22 0718 (!) 96.4 °F (35.8 °C) 65 20 (!) 178/77 94 %   10/07/22 0352 -- 81 -- -- --   10/07/22 0238 98 °F (36.7 °C) 95 18 138/64 99 %   10/07/22 0020 -- 82 18 -- --   10/06/22 2228 97.8 °F (36.6 °C) 92 18 139/70 100 %   10/06/22 2021 -- 70 -- -- --   10/06/22 2007 97.8 °F (36.6 °C) 64 18 (!) 186/66 97 %   10/06/22 1950 -- -- -- -- 100 %         Intake/Output Summary (Last 24 hours) at 10/7/2022 1700  Last data filed at 10/7/2022 0815  Gross per 24 hour   Intake 909.41 ml   Output 575 ml   Net 334.41 ml          I had a face to face encounter and independently examined this patient on 10/7/2022, as outlined below:  PHYSICAL EXAM:  General: WD, WN. Alert, cooperative, no acute distress    EENT:  EOMI. Anicteric sclerae. MMM  Resp:  CTA bilaterally, no wheezing or rales. No accessory muscle use  CV:  Regular  rhythm,  No edema  GI:  Soft, Non distended, Non tender. +Bowel sounds  Neurologic:  Alert and oriented X 3, normal speech,   Psych:   Good insight. Not anxious nor agitated  Skin:  No rashes.   No jaundice    Reviewed most current lab test results and cultures  YES  Reviewed most current radiology test results   YES  Review and summation of old records today    NO  Reviewed patient's current orders and MAR    YES  PMH/SH reviewed - no change compared to H&P  ________________________________________________________________________  Care Plan discussed with:    Comments   Patient     Family      RN     Care Manager     Consultant                        Multidiciplinary team rounds were held today with , nursing, pharmacist and clinical coordinator. Patient's plan of care was discussed; medications were reviewed and discharge planning was addressed. ________________________________________________________________________  Total NON critical care TIME:  40  Minutes    Total CRITICAL CARE TIME Spent:   Minutes non procedure based      Comments   >50% of visit spent in counseling and coordination of care     ________________________________________________________________________  Chadwick Garg MD     Procedures: see electronic medical records for all procedures/Xrays and details which were not copied into this note but were reviewed prior to creation of Plan. LABS:  I reviewed today's most current labs and imaging studies. Pertinent labs include:  Recent Labs     10/07/22  0408 10/06/22  0248 10/05/22  1419 10/05/22  1028 10/05/22  0338   WBC 6.3 7.6  --   --  5.9   HGB 7.9* 8.0* 8.0*   < > 6.3*   HCT 25.2* 24.9* 25.3*   < > 20.5*    217  --   --  215    < > = values in this interval not displayed.        Recent Labs     10/07/22  0408 10/06/22  0248 10/05/22  0338 10/04/22  1720   * 147* 144 143   K 3.0* 3.3* 3.8 3.7   * 109* 108 105   CO2 35* 37* 34* 36*   * 69 94 130*   BUN 7 8 11 11   CREA 0.93 0.72 0.94 1.09*   CA 9.2 9.1 9.1 9.3   MG 2.1 1.9  --   --    PHOS 2.8 2.2*  --   --    ALB  --  2.2* 2.1* 2.5*   TBILI  --  1.4* 0.5 0.7   ALT  --  136* 114* 132*         Signed: Chadwick Garg MD

## 2022-10-07 NOTE — PROGRESS NOTES
SBAR to [employer]. Routine progression of care. Patient on portable tele monitor and 2lnc.  VSS

## 2022-10-07 NOTE — PROGRESS NOTES
.. TRANSFER - IN REPORT:    Verbal report received from Siomara(name) on Keeler Due  being received from 2180(unit) for ordered procedure      Report consisted of patients Situation, Background, Assessment and   Recommendations(SBAR). Information from the following report(s) Procedure Summary, Intake/Output, MAR, Accordion, and Recent Results was reviewed with the receiving nurse. Opportunity for questions and clarification was provided. Assessment completed upon patients arrival to unit and care assumed.

## 2022-10-07 NOTE — PROGRESS NOTES
Hospital follow-up PCP transitional care appointment has been scheduled with Dr. Carlos Rosales on 10/12/22 at 1100. Pending patient discharge.   Lavelle Chavez, Care Management Assistant

## 2022-10-07 NOTE — CONSULTS
Palliative Medicine Consult  Akil: [phone] ([phone])    Patient Name: Teo Duarte  YOB: 1928    Date of Initial Consult: 10/7/2022  Reason for Consult: Care decisions   Requesting Provider: Dr. Patterson Heading  Primary Care Physician: Nilo Stearns MD     SUMMARY:   Teo Duarte is a 80 y.o. 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 Ms. Jv Bell PCP referred her to hospital d/t dark stools and Hgb of 6.5.. Course of hospitalization: Received 1 unit prbcs initially, HGB stable. 10/7 EGD + mild diffuse erythema and congestion in stomach, but no bleeding, +hiatal hernia. Colonoscopy + sigmoid mass concerning for infiltrative colon cancer      Current medical issues leading to Palliative Medicine involvement include: care decisions. Social: At baseline alert/oriented fully, uses 2L O2 and a walker. - she has 6 children and lives w/ dtr Ivan Sears. Can do all ADLs, did not cook or drive. PALLIATIVE DIAGNOSES:   Palliative care encounter  Fatigue  Weakness, generalized  DNR Discussion  Goals of care discussion       PLAN:   Chart reviewed prior to visit. I met with Ms. Celi Castro, she was sleeping, slow to wake up. No family at bedside. She is oriented to situation, did not initially remember that she had  egd and colonoscopy today, but was easily reoriented. ,denied pain, denied sob. Briefly discussed mass and that bx pending. Did not discuss GOC today, will wait until bx results in as it may offer guidance. Following for support.    Initial consult note routed to primary continuity provider and/or primary health care team members  Communicated plan of care with: [facility] incl RN     GOALS OF CARE / TREATMENT PREFERENCES:     GOALS OF CARE: TBD       TREATMENT PREFERENCES:   Code Status: Full Code    Patient and family's personal goals include:to get home      Advance Care Planning:  [x] [facility] Interdisciplinary Team has updated the ACP Navigator with Health Care Decision Maker and Patient Capacity        Advance Care Planning 9/24/2022   Confirm Advance Directive None               Other:    As far as possible, the palliative care team has discussed with patient / health care proxy about goals of care / treatment preferences for patient. HISTORY:     History obtained from: pt, chart, staff     CHIEF COMPLAINT: Im thirsty    HPI/SUBJECTIVE:    The patient is:   [x] Verbal and participatory  [] Non-participatory due to:     Pt fatigued, thirsty.  Denied pain/sob    Clinical Pain Assessment (nonverbal scale for severity on nonverbal patients):              Duration: for how long has pt been experiencing pain (e.g., 2 days, 1 month, years)  Frequency: how often pain is an issue (e.g., several times per day, once every few days, constant)     FUNCTIONAL ASSESSMENT:     Palliative Performance Scale (PPS):          PSYCHOSOCIAL/SPIRITUAL SCREENING:     Palliative IDT has assessed this patient for cultural preferences / practices and a referral made as appropriate to needs (Cultural Services, Patient Advocacy, Ethics, etc.)    Any spiritual / Temple concerns:  [] Yes /  [x] No   If \"Yes\" to discuss with pastoral care during IDT     Does caregiver feel burdened by caring for their loved one:   [] Yes /  [x] No /  [] No Caregiver Present    If \"Yes\" to discuss with social work during IDT    Anticipatory grief assessment:   [x] Normal  / [] Maladaptive     If \"Maladaptive\" to discuss with social work during IDT    ESAS Anxiety:      ESAS Depression:          REVIEW OF SYSTEMS:     Positive and pertinent negative findings in ROS are noted above in HPI. The following systems were [x] reviewed / [] unable to be reviewed as noted in HPI  Other findings are noted below. Systems: constitutional, ears/nose/mouth/throat, respiratory, gastrointestinal, genitourinary, musculoskeletal, integumentary, neurologic, psychiatric, endocrine. Positive findings noted below. Modified ESAS Completed by: provider                                   Stool Occurrence(s): 2        PHYSICAL EXAM:     From RN flowsheet:  Wt Readings from Last 3 Encounters:   10/04/22 167 lb 5.3 oz (75.9 kg)   09/28/22 167 lb 5.3 oz (75.9 kg)   05/23/22 185 lb 3 oz (84 kg)     Blood pressure (!) 192/90, pulse 60, temperature 97.6 °F (36.4 °C), resp. rate 18, height 5' 6\" (1.676 m), weight 167 lb 5.3 oz (75.9 kg), SpO2 100 %. Pain Scale 1: Visual  Pain Intensity 1: 0                 Last bowel movement, if known:     Constitutional: awake, alert, oriented, NAD  Eyes: pupils equal, anicteric  ENMT: no nasal discharge, moist mucous membranes  Respiratory: breathing not labored  Skin: warm, dry  Neurologic: following commands, moving all extremities  Psychiatric: full affect, no hallucinations       HISTORY:     Active Problems:    Arthritis ()      Lumbar stenosis (3/13/2012)      Ischemic stroke ([address] (9/24/2022)      Blood loss anemia (10/4/2022)      GIB (gastrointestinal bleeding) (10/4/2022)      ALEX (acute kidney injury) ([address] (10/4/2022)    Past Medical History:   Diagnosis Date    Arthritis     Diabetes ([address]     Hard of hearing     Hypertension     Hypothyroidism     Poor historian       Past Surgical History:   Procedure Laterality Date    HX TUBAL LIGATION      IR PERC ART MECH THROMB INFUSION INTRACRANIAL  5/18/2022      History reviewed. No pertinent family history. History reviewed, no pertinent family history.   Social History     Tobacco Use    Smoking status: Former    Smokeless tobacco: Not on file Substance Use Topics    Alcohol use: No     No Known Allergies   Current Facility-Administered Medications   Medication Dose Route Frequency    dextrose 5% infusion  125 mL/hr IntraVENous CONTINUOUS    0.9% sodium chloride infusion  25 mL/hr IntraVENous CONTINUOUS    0.9% sodium chloride infusion  125 mL/hr IntraVENous CONTINUOUS    hydrALAZINE (APRESOLINE) 20 mg/mL injection 10 mg  10 mg IntraVENous Q6H PRN    piperacillin-tazobactam (ZOSYN) 3.375 g in 0.9% sodium chloride (MBP/ADV) 100 mL MBP  3.375 g IntraVENous Q8H    pantoprazole (PROTONIX) 40 mg in 0.9% sodium chloride 10 mL injection  40 mg IntraVENous Q12H    0.9% sodium chloride infusion 250 mL  250 mL IntraVENous PRN    ondansetron (ZOFRAN) injection 4 mg  4 mg IntraVENous Q4H PRN    0.9% sodium chloride infusion 250 mL  250 mL IntraVENous PRN    ferrous sulfate tablet 325 mg  325 mg Oral BID WITH MEALS    fluticasone propionate (FLONASE) 50 mcg/actuation nasal spray 2 Spray  2 Spray Both Nostrils DAILY    gabapentin (NEURONTIN) capsule 100 mg  100 mg Oral TID    levothyroxine (SYNTHROID) tablet 125 mcg  125 mcg Oral DAILY    amLODIPine (NORVASC) tablet 5 mg  5 mg Oral DAILY    atorvastatin (LIPITOR) tablet 80 mg  80 mg Oral DAILY    levETIRAcetam (KEPPRA) tablet 250 mg  250 mg Oral BID    arformoterol 15 mcg/budesonide 0.5 mg neb solution   Nebulization BID RT    cetirizine (ZYRTEC) tablet 10 mg  10 mg Oral DAILY    acetaminophen (TYLENOL) tablet 650 mg  650 mg Oral Q6H PRN          LAB AND IMAGING FINDINGS:     Lab Results   Component Value Date/Time    WBC 6.3 10/07/2022 04:08 AM    HGB 7.9 (L) 10/07/2022 04:08 AM    PLATELET 800 50/20/9832 04:08 AM     Lab Results   Component Value Date/Time    Sodium 154 (H) 10/07/2022 04:08 AM    Potassium 3.0 (L) 10/07/2022 04:08 AM    Chloride 118 (H) 10/07/2022 04:08 AM    CO2 35 (H) 10/07/2022 04:08 AM    BUN 7 10/07/2022 04:08 AM    Creatinine 0.93 10/07/2022 04:08 AM    Calcium 9.2 10/07/2022 04:08 AM Magnesium 2.1 10/07/2022 04:08 AM    Phosphorus 2.8 10/07/2022 04:08 AM      Lab Results   Component Value Date/Time    Alk. phosphatase 77 10/06/2022 02:48 AM    Protein, total 5.3 (L) 10/06/2022 02:48 AM    Albumin 2.2 (L) 10/06/2022 02:48 AM    Globulin 3.1 10/06/2022 02:48 AM     Lab Results   Component Value Date/Time    INR 1.1 09/24/2022 02:12 PM    Prothrombin time 11.4 (H) 09/24/2022 02:12 PM      Lab Results   Component Value Date/Time    Iron 25 (L) 10/05/2022 02:50 AM    TIBC 149 (L) 10/05/2022 02:50 AM    Iron % saturation 17 (L) 10/05/2022 02:50 AM    Ferritin 35 10/06/2022 02:48 AM      No results found for: PH, PCO2, PO2  No components found for: GLPOC   No results found for: CPK, CKMB             Total time: 50  Counseling / coordination time, spent as noted above: 35  > 50% counseling / coordination?: yes    Prolonged service was provided for  []30 min   []75 min in face to face time in the presence of the patient, spent as noted above. Time Start:   Time End:   Note: this can only be billed with 56717 (initial) or 22154 (follow up). If multiple start / stop times, list each separately.

## 2022-10-07 NOTE — ROUTINE PROCESS
Monse Singer  9/17/1928  [id number]    Situation:  Verbal report received from: Brandon Maher  Procedure: Procedure(s) with comments:  ESOPHAGOGASTRODUODENOSCOPY (EGD)  COLONOSCOPY  ESOPHAGOGASTRODUODENAL (EGD) BIOPSY  INJECTION - tatoo 2ml rachel ink  COLON BIOPSY    Background:    Preoperative diagnosis: gi bleed  Postoperative diagnosis: EGD: Gastritis  COLON: Colon Mass, diverticulosis    :  Dr. Mabel Frazier  Assistant(s): Endoscopy RN-1: Alexandra Vaughan RN    Specimens:   ID Type Source Tests Collected by Time Destination   1 : Gastric BX Preservative Gastric  Inocencia Hyman MD 10/7/2022 1007 Pathology   2 : Recto/Sigmoid Colon Mass BX Preservative Sigmoid  Inocencia Hyman MD 10/7/2022 1032 Pathology     H. Pylori  no    Assessment:    Anesthesia gave intra-procedure sedation and medications, see anesthesia flow sheet yes    Intravenous fluids: NS@ KVO     Vital signs stable yes    Abdominal assessment: round and soft yes    Recommendation:  Discharge patient per MD orderno.   Return to [address] [place] [address] Avenue or Friend yes  Permission to share finding with family or friend yes

## 2022-10-07 NOTE — PROGRESS NOTES
No blood reported. No change in cardiovascular, respiratory, abdominal exam. Deemed appropriate candidate for endoscopy. A/p:  Mrs. Rodriguez Carter is a 81yo with Pmx/o HTN, severe pulmonary artery HTN >60-80mmHg, Afib previously on apixaban last dose 10/4/2022, T2DM, 5/2022 R-MCA stroke s/p thrombectomy with recurrent stroke with CTA negative for bleed s/p empiric TNKase 9/24/2022, with acute blood loss anemia and melena. Maintain hgb >8 in setting of recent stroke      Recommend empiric PPI. Excellent news that EGD 9/20/2022 was normal via care-connect. She is a very high risk patient for a low-risk procedure and family agrees with to proceed with EGD & Colonoscopy +/- VCE if endoscopy unrevealing.

## 2022-10-07 NOTE — PROGRESS NOTES
Transition of Care Plan:    RUR: 19% mod  Disposition:  Home with 24/7 family assist and CLEMENTE with [facility]  - orders sent for SN and PT/OT via fax and confirmed receipt with intake  Follow up appointments:PCP/Specialist  DME needed: owns needed dme  Transportation at . [address] or means to access home:     family   IM Medicare Letter: given 10/7/22  Is patient a [place] and connected with the [place]? N/a  If yes, was [place] transfer form completed and [facility] notified? Caregiver Contact:    Koyr Ahuja daughter [phone]  Marcos Escobedo daughter  [phone]  Discharge Caregiver contacted prior to discharge? yes  Care Conference needed?:           no    CM faxed medicals and home care orders to [facility] at [phone] # [phone] - spoke with intake who confirmed they will accept patient for [facility] and informed them that patient may be discharged over the weekend. Medicare pt has received, reviewed, and signed 2nd  letter informing them of their right to appeal the discharge. Signed copied has been placed on pt bedside chart. Care Management Interventions  PCP Verified by CM: Yes  Palliative Care Criteria Met (RRAT>21 & CHF Dx)?: No  Mode of Transport at Discharge:  Other (see comment) (Daughter)  Transition of Care Consult (CM Consult): Discharge Planning  Discharge Durable Medical Equipment: No (Pt owns all DME required for her care per daughter - walker, shower bench, bedside commode, wheelchair)  Physical Therapy Consult: Yes  Occupational Therapy Consult: Yes  Speech Therapy Consult: Yes  Support Systems: Child(piotr) (Daughter, Kory Ahuja, is main caregiver and support)  Confirm Follow Up Transport: Family (Daughter)  The Patient and/or Patient Representative was Provided with a Choice of Provider and Agrees with the Discharge Plan?: Yes  Freedom of Choice List was Provided with Basic Dialogue that Supports the Patient's Individualized Plan of Care/Goals, Treatment Preferences and Shares the Quality Data Associated with the Providers?: Yes  Discharge Location  Patient Expects to be Discharged to[de-identified] Home with home health (Home with continued full time care from pt's daughter; [address])

## 2022-10-07 NOTE — PROGRESS NOTES
Received notification from bedside RN about patient with regards to: K+ 3.0, NPO  VS: /64, HR 95, RR 18, O2 sat 99% on NC 2 L      Intervention given: KCL 10 meq IV x 4 doses, BMP for tomorrow AM ordered

## 2022-10-07 NOTE — PROGRESS NOTES
ADULT PROTOCOL: JET AEROSOL ASSESSMENT    Patient  Diana Walker     80 y.o.   female     10/7/2022  5:57 PM    Breath Sounds Pre Procedure: Right Breath Sounds: Clear, Diminished                               Left Breath Sounds: Clear, Diminished    Breath Sounds Post Procedure: Right Breath Sounds: Clear, Diminished                                 Left Breath Sounds: Clear, Diminished    Breathing pattern: Pre procedure Breathing Pattern: Regular          Post procedure Breathing Pattern: Regular    Heart Rate: Pre procedure Pulse: 72           Post procedure Pulse: 74    Resp Rate: Pre procedure Respirations: 18           Post procedure Respirations: 18    Cough: Pre procedure Cough: Non-productive               Post procedure Cough: Non-productive    Oxygen: O2 Device: Nasal cannula   O2 Flow Rate (L/min): 3 l/min     Changed: NO    SpO2: Pre procedure SpO2: 96 %   with oxygen              Post procedure SpO2: 94 %  with oxygen    Nebulizer Therapy: Current medications Aerosolized Medications: Pulmicort, Brovana      Changed: YES  Patient doesn't take these treatments at home. Discontinued treatments.     Smoking History:   Social History     Tobacco Use   Smoking Status Former   Smokeless Tobacco Not on file       Problem List:   Patient Active Problem List   Diagnosis Code    Arthritis M19.90    Lumbar stenosis M48.061    Stroke (cerebrum) ([facility]) I63.9    Abnormal EEG R94.01    Ischemic stroke ([facility]) I63.9    Blood loss anemia D50.0    GIB (gastrointestinal bleeding) K92.2    ALEX (acute kidney injury) ([facility] N17.9       Respiratory Therapist: Miri Lechuga RT

## 2022-10-07 NOTE — PROCEDURES
Colonoscopy and EGD Procedure Note      : Senait Sepulveda MD    Staff: Endoscopy RN-1: Alexandra Vaughan RN    Referring Provider: Govind Moser MD    Sedation:  MAC anesthesia Propofol    Procedure Details:  After informed consent was obtained with all risks and benefits of procedure explained and pre-operative exam completed, pt was placed in the left lateral decubitus position. Following sequential administration of sedation as per above, the gastroscope was inserted into the mouth and advanced under direct vision to second portion of the duodenum. A careful inspection was made as the gastroscope was withdrawn, including a retroflexed view of the proximal stomach; findings and interventions are described below. EGD Findings:  Esophagus:Normal esophagus. EGJ at 40cm. 2cm sliding hiatal hernia with GEFV hill-3. Stomach:mild diffuse erythema and congestion with pill material coating walls, no blood or evidence of bleeding, biopsied cold forceps with minimal bleeding antrum, incisura, body, cardia, to r/o H pylori. Duodenum/jejunum:normal    The bed was then turned and upon sequential sedation as per above, a digital rectal exam was performed per below. The Olympus videocolonoscope was inserted in the rectum and carefully advanced to the cecum, which was identified by the ileocecal valve and appendiceal orifice. The quality of preparation was fair BPS 2/2/2 6 after significant lavage. The colonoscope was slowly withdrawn with careful evaluation between folds. Retroflexion in the rectum was performed. Colon Findings:   CUCO: unremarkable  Rectum: normal  no mucosal lesion appreciated  Sigmoid: at ~25-30cm was a large [id number] ulcerated NICE-3 colon mass concerning for an infiltrative colon cancer. At the end of procedure this was biopsied cold forceps extensively with minimal bleeding and three cc of tattoo were placed submucosally ~5cm distally.   Moderate diverticulosis  Descending Colon: mild diverticulosis, otherwise unremarkable. Transverse Colon: normal  no mucosal lesion appreciated  Ascending Colon: normal  no mucosal lesion appreciated  Cecum: normal  no mucosal lesion appreciated  Terminal Ileum: not intubated          Specimens Removed:    ID Type Source Tests Collected by Time Destination   1 : Gastric BX Preservative Gastric  Yunier Mendez MD 10/7/2022 1007 Pathology   2 : Recto/Sigmoid Colon Mass BX Preservative Sigmoid  Radha Waller MD 10/7/2022 1032 Pathology       Complications: None. EBL:  minimal    Impression:    See Postoperative diagnosis above    Recommendations:   - Await pathology  - Ok to eat per primary team.  - discussed w/ Erlene.     Discharge Disposition:  wards  Edy Schmidt MD  10/7/2022  10:50 AM

## 2022-10-07 NOTE — ANESTHESIA POSTPROCEDURE EVALUATION
Procedure(s):  ESOPHAGOGASTRODUODENOSCOPY (EGD)  COLONOSCOPY  ESOPHAGOGASTRODUODENAL (EGD) BIOPSY  INJECTION  COLON BIOPSY. general, total IV anesthesia    Anesthesia Post Evaluation        Patient location during evaluation: PACU  Note status: Adequate. Level of consciousness: responsive to verbal stimuli and sleepy but conscious  Pain management: satisfactory to patient  Airway patency: patent  Anesthetic complications: no  Cardiovascular status: acceptable  Respiratory status: acceptable  Hydration status: acceptable  Comments: +Post-Anesthesia Evaluation and Assessment    Patient: Chance Salmon MRN: [id number]  SSN: [id number]   YOB: 1928  Age: 80 y.o. Sex: female      Cardiovascular Function/Vital Signs    BP (!) 179/88   Pulse 66   Temp (!) 35.8 °C (96.4 °F)   Resp 21   Ht 5' 6\" (1.676 m)   Wt 75.9 kg (167 lb 5.3 oz)   SpO2 98%   BMI 27.01 kg/m²     Patient is status post Procedure(s) with comments:  ESOPHAGOGASTRODUODENOSCOPY (EGD)  COLONOSCOPY  ESOPHAGOGASTRODUODENAL (EGD) BIOPSY  INJECTION - tatoo 2ml rachel ink  COLON BIOPSY. Nausea/Vomiting: Controlled. Postoperative hydration reviewed and adequate. Pain:  Pain Scale 1: Numeric (0 - 10) (10/07/22 0925)  Pain Intensity 1: 0 (10/07/22 0925)   Managed. Neurological Status: At baseline. Mental Status and Level of Consciousness: Arousable. Pulmonary Status:   O2 Device: Nasal cannula (10/07/22 1052)   Adequate oxygenation and airway patent. Complications related to anesthesia: None    Post-anesthesia assessment completed. No concerns. Signed By: Savannah Martinez MD    10/7/2022  Post anesthesia nausea and vomiting:  controlled      INITIAL Post-op Vital signs:   Vitals Value Taken Time   /90 10/07/22 1109   Temp     Pulse 66 10/07/22 1109   Resp 21 10/07/22 1109   SpO2 99 % 10/07/22 1109   Vitals shown include unvalidated device data.

## 2022-10-08 LAB
ANION GAP SERPL CALC-SCNC: 3 MMOL/L (ref 5–15)
BACTERIA SPEC CULT: ABNORMAL
BACTERIA SPEC CULT: ABNORMAL
BASOPHILS # BLD: 0 K/UL (ref 0–0.1)
BASOPHILS NFR BLD: 1 % (ref 0–1)
BUN SERPL-MCNC: 5 MG/DL (ref 6–20)
BUN/CREAT SERPL: 5 (ref 12–20)
CALCIUM SERPL-MCNC: 8.9 MG/DL (ref 8.5–10.1)
CC UR VC: ABNORMAL
CHLORIDE SERPL-SCNC: 112 MMOL/L (ref 97–108)
CO2 SERPL-SCNC: 33 MMOL/L (ref 21–32)
CREAT SERPL-MCNC: 0.92 MG/DL (ref 0.55–1.02)
DIFFERENTIAL METHOD BLD: ABNORMAL
EOSINOPHIL # BLD: 0.5 K/UL (ref 0–0.4)
EOSINOPHIL NFR BLD: 8 % (ref 0–7)
ERYTHROCYTE [DISTWIDTH] IN BLOOD BY AUTOMATED COUNT: 14.6 % (ref 11.5–14.5)
GLUCOSE SERPL-MCNC: 93 MG/DL (ref 65–100)
HCT VFR BLD AUTO: 25.3 % (ref 35–47)
HGB BLD-MCNC: 7.9 G/DL (ref 11.5–16)
IMM GRANULOCYTES # BLD AUTO: 0 K/UL (ref 0–0.04)
IMM GRANULOCYTES NFR BLD AUTO: 0 % (ref 0–0.5)
LYMPHOCYTES # BLD: 1 K/UL (ref 0.8–3.5)
LYMPHOCYTES NFR BLD: 16 % (ref 12–49)
MAGNESIUM SERPL-MCNC: 1.9 MG/DL (ref 1.6–2.4)
MCH RBC QN AUTO: 29.2 PG (ref 26–34)
MCHC RBC AUTO-ENTMCNC: 31.2 G/DL (ref 30–36.5)
MCV RBC AUTO: 93.4 FL (ref 80–99)
MONOCYTES # BLD: 0.7 K/UL (ref 0–1)
MONOCYTES NFR BLD: 11 % (ref 5–13)
NEUTS SEG # BLD: 4 K/UL (ref 1.8–8)
NEUTS SEG NFR BLD: 64 % (ref 32–75)
NRBC # BLD: 0 K/UL (ref 0–0.01)
NRBC BLD-RTO: 0 PER 100 WBC
PHOSPHATE SERPL-MCNC: 2 MG/DL (ref 2.6–4.7)
PLATELET # BLD AUTO: 222 K/UL (ref 150–400)
PMV BLD AUTO: 10.9 FL (ref 8.9–12.9)
POTASSIUM SERPL-SCNC: 2.7 MMOL/L (ref 3.5–5.1)
RBC # BLD AUTO: 2.71 M/UL (ref 3.8–5.2)
SERVICE CMNT-IMP: ABNORMAL
SODIUM SERPL-SCNC: 148 MMOL/L (ref 136–145)
WBC # BLD AUTO: 6.2 K/UL (ref 3.6–11)

## 2022-10-08 PROCEDURE — 83735 ASSAY OF MAGNESIUM: CPT

## 2022-10-08 PROCEDURE — 74011250636 HC RX REV CODE- 250/636: Performed by: NURSE PRACTITIONER

## 2022-10-08 PROCEDURE — 74011000258 HC RX REV CODE- 258: Performed by: INTERNAL MEDICINE

## 2022-10-08 PROCEDURE — 94761 N-INVAS EAR/PLS OXIMETRY MLT: CPT

## 2022-10-08 PROCEDURE — 77010033678 HC OXYGEN DAILY

## 2022-10-08 PROCEDURE — 74011250637 HC RX REV CODE- 250/637: Performed by: STUDENT IN AN ORGANIZED HEALTH CARE EDUCATION/TRAINING PROGRAM

## 2022-10-08 PROCEDURE — 74011250636 HC RX REV CODE- 250/636: Performed by: INTERNAL MEDICINE

## 2022-10-08 PROCEDURE — 84100 ASSAY OF PHOSPHORUS: CPT

## 2022-10-08 PROCEDURE — 74011250636 HC RX REV CODE- 250/636: Performed by: STUDENT IN AN ORGANIZED HEALTH CARE EDUCATION/TRAINING PROGRAM

## 2022-10-08 PROCEDURE — 36415 COLL VENOUS BLD VENIPUNCTURE: CPT

## 2022-10-08 PROCEDURE — 74011000250 HC RX REV CODE- 250: Performed by: STUDENT IN AN ORGANIZED HEALTH CARE EDUCATION/TRAINING PROGRAM

## 2022-10-08 PROCEDURE — 65270000046 HC RM TELEMETRY

## 2022-10-08 PROCEDURE — 80048 BASIC METABOLIC PNL TOTAL CA: CPT

## 2022-10-08 PROCEDURE — 85025 COMPLETE CBC W/AUTO DIFF WBC: CPT

## 2022-10-08 PROCEDURE — 74011250637 HC RX REV CODE- 250/637: Performed by: NURSE PRACTITIONER

## 2022-10-08 PROCEDURE — C9113 INJ PANTOPRAZOLE SODIUM, VIA: HCPCS | Performed by: STUDENT IN AN ORGANIZED HEALTH CARE EDUCATION/TRAINING PROGRAM

## 2022-10-08 RX ORDER — POTASSIUM CHLORIDE 7.45 MG/ML
10 INJECTION INTRAVENOUS
Status: COMPLETED | OUTPATIENT
Start: 2022-10-08 | End: 2022-10-08

## 2022-10-08 RX ADMIN — POTASSIUM CHLORIDE 10 MEQ: 7.46 INJECTION, SOLUTION INTRAVENOUS at 10:19

## 2022-10-08 RX ADMIN — LEVETIRACETAM 250 MG: 250 TABLET, FILM COATED ORAL at 08:25

## 2022-10-08 RX ADMIN — PIPERACILLIN AND TAZOBACTAM 3.38 G: 3; .375 INJECTION, POWDER, FOR SOLUTION INTRAVENOUS at 16:38

## 2022-10-08 RX ADMIN — ATORVASTATIN CALCIUM 80 MG: 40 TABLET, FILM COATED ORAL at 08:24

## 2022-10-08 RX ADMIN — SODIUM CHLORIDE, PRESERVATIVE FREE 40 MG: 5 INJECTION INTRAVENOUS at 22:05

## 2022-10-08 RX ADMIN — AMLODIPINE BESYLATE 5 MG: 5 TABLET ORAL at 08:24

## 2022-10-08 RX ADMIN — FLUTICASONE PROPIONATE 2 SPRAY: 50 SPRAY, METERED NASAL at 10:19

## 2022-10-08 RX ADMIN — POTASSIUM CHLORIDE 10 MEQ: 7.46 INJECTION, SOLUTION INTRAVENOUS at 09:38

## 2022-10-08 RX ADMIN — LEVETIRACETAM 250 MG: 250 TABLET, FILM COATED ORAL at 17:46

## 2022-10-08 RX ADMIN — GABAPENTIN 100 MG: 100 CAPSULE ORAL at 22:05

## 2022-10-08 RX ADMIN — PIPERACILLIN AND TAZOBACTAM 3.38 G: 3; .375 INJECTION, POWDER, FOR SOLUTION INTRAVENOUS at 00:07

## 2022-10-08 RX ADMIN — PIPERACILLIN AND TAZOBACTAM 3.38 G: 3; .375 INJECTION, POWDER, FOR SOLUTION INTRAVENOUS at 08:25

## 2022-10-08 RX ADMIN — CETIRIZINE HYDROCHLORIDE 10 MG: 10 TABLET, FILM COATED ORAL at 09:00

## 2022-10-08 RX ADMIN — FERROUS SULFATE TAB 325 MG (65 MG ELEMENTAL FE) 325 MG: 325 (65 FE) TAB at 16:38

## 2022-10-08 RX ADMIN — POTASSIUM BICARBONATE 40 MEQ: 782 TABLET, EFFERVESCENT ORAL at 06:14

## 2022-10-08 RX ADMIN — POTASSIUM CHLORIDE 10 MEQ: 7.46 INJECTION, SOLUTION INTRAVENOUS at 08:23

## 2022-10-08 RX ADMIN — SODIUM CHLORIDE, PRESERVATIVE FREE 40 MG: 5 INJECTION INTRAVENOUS at 08:25

## 2022-10-08 RX ADMIN — POTASSIUM CHLORIDE 10 MEQ: 7.46 INJECTION, SOLUTION INTRAVENOUS at 06:14

## 2022-10-08 RX ADMIN — DEXTROSE MONOHYDRATE 125 ML/HR: 50 INJECTION, SOLUTION INTRAVENOUS at 22:16

## 2022-10-08 RX ADMIN — LEVOTHYROXINE SODIUM 125 MCG: 0.12 TABLET ORAL at 08:24

## 2022-10-08 RX ADMIN — GABAPENTIN 100 MG: 100 CAPSULE ORAL at 08:24

## 2022-10-08 RX ADMIN — GABAPENTIN 100 MG: 100 CAPSULE ORAL at 16:38

## 2022-10-08 RX ADMIN — POTASSIUM BICARBONATE 40 MEQ: 782 TABLET, EFFERVESCENT ORAL at 12:49

## 2022-10-08 RX ADMIN — POTASSIUM BICARBONATE 40 MEQ: 782 TABLET, EFFERVESCENT ORAL at 17:45

## 2022-10-08 RX ADMIN — FERROUS SULFATE TAB 325 MG (65 MG ELEMENTAL FE) 325 MG: 325 (65 FE) TAB at 08:25

## 2022-10-08 NOTE — PROGRESS NOTES
Bedside shift report given to oncoming nurse Kimmie Buckley RN by off going nurse [facility] RN patient notes no distress during shift change will continue to assess     1900  Bedside shift report given to oncjuliana Bermudez RN by off going nurse Kimmie Buckley RN patient stable with no distress noted

## 2022-10-08 NOTE — PROGRESS NOTES
Hospitalist Progress Note    NAME: Germania Toussaint   :  1928   MRN:  [id number]       Assessment / Plan:  Acute blood loss anemia, likely secondary to gastrointestinal hemorrhage  -GI consulted  -Trend H&H  -s/p 2 units PRBC, goal hgb > 7  -Protonix IV twice daily  -Received 2 units PRBC. Hgb remains stable at 7.9  Monitor  -Hold eliquis, appears it had been resumed from recent DC  -Patient with BECKY, Will hold off on exogenous iron given she is receiving blood  -EGD/Colon showed a colonic mass which is suspicious for cancer. Await biopsies  -Palliative consulted, still remains full code. Would benefit from hospice at this point  -Remain off AC    Recent CVA  S/p TNK  Unable to undergo MRI due to pacer  C/w high intensity statin  Remains on keppra for seizure ppx  Diet puree with thin liquids   SLP consulted    Hypernatremia  Hypokalemia  Hypophosphatemia  Na improved to 148  c/w D5W  Monitor and replete PRN    PAF  S/p PPM  Holding eliquis    Acute kidney injury  (resolved)  -Now on D5 W  -Likely from decreased volume from presumed GIB    Hypertension  -Continue home Norvasc    Hyperlipidemia  -Continue home atorvastatin    Hypothyroidism  -Continue home Synthroid    Allergies  -Continue home Claritin, fluticasone and Symbicort    PPX SCD    Code status: Full       Subjective:     Chief Complaint / Reason for Physician Visit  Patient seen and examined at the bedside. Does not fully comprehend her situation. She currently has no complaints and no pain. Review of Systems:  Symptom Y/N Comments  Symptom Y/N Comments   Fever/Chills    Chest Pain     Poor Appetite    Edema     Cough    Abdominal Pain     Sputum    Joint Pain     SOB/HAWKINS    Pruritis/Rash     Nausea/vomit    Tolerating PT/OT     Diarrhea    Tolerating Diet     Constipation    Other       Could NOT obtain due to:      Objective:     VITALS:   Last 24hrs VS reviewed since prior progress note.  Most recent are:  Patient Vitals for the past 24 hrs:   Temp Pulse Resp BP SpO2   10/08/22 0740 97.8 °F (36.6 °C) 73 18 (!) 153/77 100 %   10/08/22 0615 97.8 °F (36.6 °C) 73 18 (!) 157/82 93 %   10/07/22 2123 97.7 °F (36.5 °C) 66 18 138/63 100 %   10/07/22 1223 97.6 °F (36.4 °C) 60 18 (!) 192/90 100 %       No intake or output data in the 24 hours ending 10/08/22 1139       I had a face to face encounter and independently examined this patient on 10/8/2022, as outlined below:  PHYSICAL EXAM:  General: WD, WN. Alert, cooperative, no acute distress    EENT:  EOMI. Anicteric sclerae. MMM  Resp:  CTA bilaterally, no wheezing or rales. No accessory muscle use  CV:  Regular  rhythm,  No edema  GI:  Soft, Non distended, Non tender. +Bowel sounds  Neurologic:  Alert and oriented X 3, normal speech,   Psych:   Good insight. Not anxious nor agitated  Skin:  No rashes. No jaundice    Reviewed most current lab test results and cultures  YES  Reviewed most current radiology test results   YES  Review and summation of old records today    NO  Reviewed patient's current orders and MAR    YES  PMH/ reviewed - no change compared to H&P  ________________________________________________________________________  Care Plan discussed with:    Comments   Patient     Family      RN     Care Manager     Consultant                        Multidiciplinary team rounds were held today with , nursing, pharmacist and clinical coordinator. Patient's plan of care was discussed; medications were reviewed and discharge planning was addressed.      ________________________________________________________________________  Total NON critical care TIME:  40  Minutes    Total CRITICAL CARE TIME Spent:   Minutes non procedure based      Comments   >50% of visit spent in counseling and coordination of care     ________________________________________________________________________  Jessica Irwin MD     Procedures: see electronic medical records for all procedures/Xrays and details which were not copied into this note but were reviewed prior to creation of Plan. LABS:  I reviewed today's most current labs and imaging studies.   Pertinent labs include:  Recent Labs     10/08/22  0342 10/07/22  0408 10/06/22  0248   WBC 6.2 6.3 7.6   HGB 7.9* 7.9* 8.0*   HCT 25.3* 25.2* 24.9*    229 217       Recent Labs     10/08/22  0342 10/07/22  0408 10/06/22  0248   * 154* 147*   K 2.7* 3.0* 3.3*   * 118* 109*   CO2 33* 35* 37*   GLU 93 113* 69   BUN 5* 7 8   CREA 0.92 0.93 0.72   CA 8.9 9.2 9.1   MG 1.9 2.1 1.9   PHOS 2.0* 2.8 2.2*   ALB  --   --  2.2*   TBILI  --   --  1.4*   ALT  --   --  136*         Signed: Sol Ralph MD

## 2022-10-08 NOTE — PROGRESS NOTES
Received notification from bedside RN about patient with regards to: K+ 2.7, noted Phos 2.0  VS: /63, HR 66, RR 18, O2 sat 100% on NC 3 L    Intervention given: KCL 10 meq IV x 4 doses, Effer K 40 meq PO x 2 doses, Kphos 20 mmol IV x 1 dose ordered, Phos added to tomorrow AM's BMP

## 2022-10-08 NOTE — PROGRESS NOTES
Bedside and Verbal shift change report given to  NEGAR Arcos  (oncoming nurse) by Franklin Galindo RN (offgoing nurse). Report included the following information SBAR, Kardex, Intake/Output, MAR, and Recent Results. Oncoming nurse notified that scheduled 6am potassium IV has not been tubed by pharmacy at this time and to watch out for it. Eloisa Bhatti

## 2022-10-09 LAB
ANION GAP SERPL CALC-SCNC: 1 MMOL/L (ref 5–15)
BASOPHILS # BLD: 0 K/UL (ref 0–0.1)
BASOPHILS NFR BLD: 1 % (ref 0–1)
BUN SERPL-MCNC: 4 MG/DL (ref 6–20)
BUN/CREAT SERPL: 5 (ref 12–20)
CALCIUM SERPL-MCNC: 8.4 MG/DL (ref 8.5–10.1)
CHLORIDE SERPL-SCNC: 110 MMOL/L (ref 97–108)
CO2 SERPL-SCNC: 33 MMOL/L (ref 21–32)
CREAT SERPL-MCNC: 0.84 MG/DL (ref 0.55–1.02)
DIFFERENTIAL METHOD BLD: ABNORMAL
EOSINOPHIL # BLD: 0.5 K/UL (ref 0–0.4)
EOSINOPHIL NFR BLD: 8 % (ref 0–7)
ERYTHROCYTE [DISTWIDTH] IN BLOOD BY AUTOMATED COUNT: 14.7 % (ref 11.5–14.5)
GLUCOSE SERPL-MCNC: 116 MG/DL (ref 65–100)
HCT VFR BLD AUTO: 26.3 % (ref 35–47)
HGB BLD-MCNC: 8.4 G/DL (ref 11.5–16)
IMM GRANULOCYTES # BLD AUTO: 0 K/UL (ref 0–0.04)
IMM GRANULOCYTES NFR BLD AUTO: 0 % (ref 0–0.5)
LYMPHOCYTES # BLD: 1.1 K/UL (ref 0.8–3.5)
LYMPHOCYTES NFR BLD: 15 % (ref 12–49)
MAGNESIUM SERPL-MCNC: 1.7 MG/DL (ref 1.6–2.4)
MCH RBC QN AUTO: 29.8 PG (ref 26–34)
MCHC RBC AUTO-ENTMCNC: 31.9 G/DL (ref 30–36.5)
MCV RBC AUTO: 93.3 FL (ref 80–99)
MONOCYTES # BLD: 0.7 K/UL (ref 0–1)
MONOCYTES NFR BLD: 10 % (ref 5–13)
NEUTS SEG # BLD: 4.6 K/UL (ref 1.8–8)
NEUTS SEG NFR BLD: 66 % (ref 32–75)
NRBC # BLD: 0 K/UL (ref 0–0.01)
NRBC BLD-RTO: 0 PER 100 WBC
PHOSPHATE SERPL-MCNC: 1.5 MG/DL (ref 2.6–4.7)
PLATELET # BLD AUTO: 231 K/UL (ref 150–400)
PMV BLD AUTO: 11.1 FL (ref 8.9–12.9)
POTASSIUM SERPL-SCNC: 4.1 MMOL/L (ref 3.5–5.1)
RBC # BLD AUTO: 2.82 M/UL (ref 3.8–5.2)
SODIUM SERPL-SCNC: 144 MMOL/L (ref 136–145)
WBC # BLD AUTO: 6.9 K/UL (ref 3.6–11)

## 2022-10-09 PROCEDURE — 80048 BASIC METABOLIC PNL TOTAL CA: CPT

## 2022-10-09 PROCEDURE — 74011250636 HC RX REV CODE- 250/636: Performed by: STUDENT IN AN ORGANIZED HEALTH CARE EDUCATION/TRAINING PROGRAM

## 2022-10-09 PROCEDURE — 77010033678 HC OXYGEN DAILY

## 2022-10-09 PROCEDURE — 51798 US URINE CAPACITY MEASURE: CPT

## 2022-10-09 PROCEDURE — 65270000046 HC RM TELEMETRY

## 2022-10-09 PROCEDURE — 74011000250 HC RX REV CODE- 250: Performed by: STUDENT IN AN ORGANIZED HEALTH CARE EDUCATION/TRAINING PROGRAM

## 2022-10-09 PROCEDURE — 36415 COLL VENOUS BLD VENIPUNCTURE: CPT

## 2022-10-09 PROCEDURE — 83735 ASSAY OF MAGNESIUM: CPT

## 2022-10-09 PROCEDURE — 74011000258 HC RX REV CODE- 258: Performed by: INTERNAL MEDICINE

## 2022-10-09 PROCEDURE — 94761 N-INVAS EAR/PLS OXIMETRY MLT: CPT

## 2022-10-09 PROCEDURE — 84100 ASSAY OF PHOSPHORUS: CPT

## 2022-10-09 PROCEDURE — 85025 COMPLETE CBC W/AUTO DIFF WBC: CPT

## 2022-10-09 PROCEDURE — C9113 INJ PANTOPRAZOLE SODIUM, VIA: HCPCS | Performed by: STUDENT IN AN ORGANIZED HEALTH CARE EDUCATION/TRAINING PROGRAM

## 2022-10-09 PROCEDURE — 74011250637 HC RX REV CODE- 250/637: Performed by: STUDENT IN AN ORGANIZED HEALTH CARE EDUCATION/TRAINING PROGRAM

## 2022-10-09 PROCEDURE — 74011250636 HC RX REV CODE- 250/636: Performed by: INTERNAL MEDICINE

## 2022-10-09 PROCEDURE — 74011000250 HC RX REV CODE- 250: Performed by: INTERNAL MEDICINE

## 2022-10-09 RX ADMIN — PIPERACILLIN AND TAZOBACTAM 3.38 G: 3; .375 INJECTION, POWDER, FOR SOLUTION INTRAVENOUS at 00:02

## 2022-10-09 RX ADMIN — PIPERACILLIN AND TAZOBACTAM 3.38 G: 3; .375 INJECTION, POWDER, FOR SOLUTION INTRAVENOUS at 09:58

## 2022-10-09 RX ADMIN — AMLODIPINE BESYLATE 5 MG: 5 TABLET ORAL at 09:58

## 2022-10-09 RX ADMIN — GABAPENTIN 100 MG: 100 CAPSULE ORAL at 22:17

## 2022-10-09 RX ADMIN — CETIRIZINE HYDROCHLORIDE 10 MG: 10 TABLET, FILM COATED ORAL at 09:58

## 2022-10-09 RX ADMIN — GABAPENTIN 100 MG: 100 CAPSULE ORAL at 09:59

## 2022-10-09 RX ADMIN — SODIUM CHLORIDE, PRESERVATIVE FREE 40 MG: 5 INJECTION INTRAVENOUS at 09:59

## 2022-10-09 RX ADMIN — DEXTROSE MONOHYDRATE 125 ML/HR: 50 INJECTION, SOLUTION INTRAVENOUS at 06:05

## 2022-10-09 RX ADMIN — GABAPENTIN 100 MG: 100 CAPSULE ORAL at 16:37

## 2022-10-09 RX ADMIN — FLUTICASONE PROPIONATE 2 SPRAY: 50 SPRAY, METERED NASAL at 11:22

## 2022-10-09 RX ADMIN — LEVOTHYROXINE SODIUM 125 MCG: 0.12 TABLET ORAL at 09:59

## 2022-10-09 RX ADMIN — DEXTROSE MONOHYDRATE 125 ML/HR: 50 INJECTION, SOLUTION INTRAVENOUS at 03:44

## 2022-10-09 RX ADMIN — DEXTROSE MONOHYDRATE 125 ML/HR: 50 INJECTION, SOLUTION INTRAVENOUS at 01:14

## 2022-10-09 RX ADMIN — ATORVASTATIN CALCIUM 80 MG: 40 TABLET, FILM COATED ORAL at 09:59

## 2022-10-09 RX ADMIN — FERROUS SULFATE TAB 325 MG (65 MG ELEMENTAL FE) 325 MG: 325 (65 FE) TAB at 16:37

## 2022-10-09 RX ADMIN — LEVETIRACETAM 250 MG: 250 TABLET, FILM COATED ORAL at 17:45

## 2022-10-09 RX ADMIN — FERROUS SULFATE TAB 325 MG (65 MG ELEMENTAL FE) 325 MG: 325 (65 FE) TAB at 09:59

## 2022-10-09 RX ADMIN — LEVETIRACETAM 250 MG: 250 TABLET, FILM COATED ORAL at 09:59

## 2022-10-09 RX ADMIN — SODIUM PHOSPHATE, MONOBASIC, MONOHYDRATE AND SODIUM PHOSPHATE, DIBASIC, ANHYDROUS: 276; 142 INJECTION, SOLUTION INTRAVENOUS at 11:14

## 2022-10-09 NOTE — PROGRESS NOTES
Bedside and Verbal shift change report given to [facility] (oncoming nurse) by Denise Torres RN (offgoing nurse). Report included the following information SBAR, Kardex, Intake/Output, MAR, and Recent Results.

## 2022-10-09 NOTE — PROGRESS NOTES
Hospitalist Progress Note    NAME: Kelly Rea   :  1928   MRN:  [id number]       Assessment / Plan:  Acute blood loss anemia, likely secondary to gastrointestinal hemorrhage  -GI consulted  -Trend H&H  -s/p 2 units PRBC, goal hgb > 7  -ok to Dc protonix  -Received 2 units PRBC. Hgb remains stable at 7.9  Monitor  -Hold eliquis, appears it had been resumed from recent DC  -Patient with BECKY, Will hold off on exogenous iron given she is receiving blood  -EGD/Colon showed a colonic mass which is suspicious for cancer. Await biopsies  -Palliative consulted, still remains full code. Would benefit from hospice at this point  -Remain off AC  - No hgb resulted for 10/9    Recent CVA  S/p TNK  Unable to undergo MRI due to pacer  C/w high intensity statin  Remains on keppra for seizure ppx  Diet puree with thin liquids   SLP consulted    Hypernatremia  Hypokalemia  Hypophosphatemia  Na improved to 144  c/w D5W  Monitor and replete PRN    PAF  S/p PPM  Holding eliquis    Acute kidney injury  (resolved)  -Now on D5 W  -Likely from decreased volume from presumed GIB    Hypertension  -Continue home Norvasc    Hyperlipidemia  -Continue home atorvastatin    Hypothyroidism  -Continue home Synthroid    Allergies  -Continue home Claritin, fluticasone and Symbicort    PPX SCD    Code status: Full    HELENE: 10/10  Barriers: Disposition, palliative       Subjective:     Chief Complaint / Reason for Physician Visit  Patient seen and examined at the bedside. Denies any complaints. Says she feels well. She currently has no complaints and no pain.          Review of Systems:  Symptom Y/N Comments  Symptom Y/N Comments   Fever/Chills    Chest Pain     Poor Appetite    Edema     Cough    Abdominal Pain     Sputum    Joint Pain     SOB/HAWKINS    Pruritis/Rash     Nausea/vomit    Tolerating PT/OT     Diarrhea    Tolerating Diet     Constipation    Other       Could NOT obtain due to:      Objective:     VITALS:   Last 24hrs VS reviewed since prior progress note. Most recent are:  Patient Vitals for the past 24 hrs:   Temp Pulse Resp BP SpO2   10/09/22 0212 98.4 °F (36.9 °C) 64 18 (!) 155/73 98 %   10/08/22 2238 98.3 °F (36.8 °C) 62 18 130/62 99 %   10/08/22 2114 98.4 °F (36.9 °C) 60 18 (!) 135/58 100 %   10/08/22 1955 98.1 °F (36.7 °C) 61 18 (!) 151/65 99 %   10/08/22 1510 97.7 °F (36.5 °C) 60 18 129/60 100 %   10/08/22 1208 97.8 °F (36.6 °C) 70 18 (!) 129/59 98 %         Intake/Output Summary (Last 24 hours) at 10/9/2022 1059  Last data filed at 10/8/2022 1800  Gross per 24 hour   Intake 1200 ml   Output 650 ml   Net 550 ml          I had a face to face encounter and independently examined this patient on 10/9/2022, as outlined below:  PHYSICAL EXAM:  General: WD, WN. Alert, cooperative, no acute distress    EENT:  EOMI. Anicteric sclerae. MMM  Resp:  CTA bilaterally, no wheezing or rales. No accessory muscle use  CV:  Regular  rhythm,  No edema  GI:  Soft, Non distended, Non tender. +Bowel sounds  Neurologic:  Alert and oriented X 3, normal speech,   Psych:   Good insight. Not anxious nor agitated  Skin:  No rashes. No jaundice    Reviewed most current lab test results and cultures  YES  Reviewed most current radiology test results   YES  Review and summation of old records today    NO  Reviewed patient's current orders and MAR    YES  PMH/SH reviewed - no change compared to H&P  ________________________________________________________________________  Care Plan discussed with:    Comments   Patient     Family      RN     Care Manager     Consultant                        Multidiciplinary team rounds were held today with , nursing, pharmacist and clinical coordinator. Patient's plan of care was discussed; medications were reviewed and discharge planning was addressed.      ________________________________________________________________________  Total NON critical care TIME:  40  Minutes    Total CRITICAL CARE TIME Spent: Minutes non procedure based      Comments   >50% of visit spent in counseling and coordination of care     ________________________________________________________________________  Jalen Wiggins MD     Procedures: see electronic medical records for all procedures/Xrays and details which were not copied into this note but were reviewed prior to creation of Plan. LABS:  I reviewed today's most current labs and imaging studies.   Pertinent labs include:  Recent Labs     10/08/22  0342 10/07/22  0408   WBC 6.2 6.3   HGB 7.9* 7.9*   HCT 25.3* 25.2*    229       Recent Labs     10/09/22  0302 10/08/22  0342 10/07/22  0408    148* 154*   K 4.1 2.7* 3.0*   * 112* 118*   CO2 33* 33* 35*   * 93 113*   BUN 4* 5* 7   CREA 0.84 0.92 0.93   CA 8.4* 8.9 9.2   MG 1.7 1.9 2.1   PHOS 1.5* 2.0* 2.8         Signed: Jalen Wiggins MD

## 2022-10-10 ENCOUNTER — HOSPICE ADMISSION (OUTPATIENT)
Dept: HOSPICE | Facility: HOSPICE | Age: 87
End: 2022-10-10

## 2022-10-10 LAB
ANION GAP SERPL CALC-SCNC: 3 MMOL/L (ref 5–15)
BASOPHILS # BLD: 0.1 K/UL (ref 0–0.1)
BASOPHILS NFR BLD: 1 % (ref 0–1)
BUN SERPL-MCNC: 5 MG/DL (ref 6–20)
BUN/CREAT SERPL: 6 (ref 12–20)
CALCIUM SERPL-MCNC: 8.3 MG/DL (ref 8.5–10.1)
CHLORIDE SERPL-SCNC: 111 MMOL/L (ref 97–108)
CO2 SERPL-SCNC: 33 MMOL/L (ref 21–32)
CREAT SERPL-MCNC: 0.8 MG/DL (ref 0.55–1.02)
DIFFERENTIAL METHOD BLD: ABNORMAL
EOSINOPHIL # BLD: 0.5 K/UL (ref 0–0.4)
EOSINOPHIL NFR BLD: 8 % (ref 0–7)
ERYTHROCYTE [DISTWIDTH] IN BLOOD BY AUTOMATED COUNT: 14.6 % (ref 11.5–14.5)
GLUCOSE SERPL-MCNC: 80 MG/DL (ref 65–100)
HCT VFR BLD AUTO: 24.3 % (ref 35–47)
HGB BLD-MCNC: 7.6 G/DL (ref 11.5–16)
IMM GRANULOCYTES # BLD AUTO: 0 K/UL (ref 0–0.04)
IMM GRANULOCYTES NFR BLD AUTO: 0 % (ref 0–0.5)
LYMPHOCYTES # BLD: 1 K/UL (ref 0.8–3.5)
LYMPHOCYTES NFR BLD: 18 % (ref 12–49)
MAGNESIUM SERPL-MCNC: 1.7 MG/DL (ref 1.6–2.4)
MCH RBC QN AUTO: 29.9 PG (ref 26–34)
MCHC RBC AUTO-ENTMCNC: 31.3 G/DL (ref 30–36.5)
MCV RBC AUTO: 95.7 FL (ref 80–99)
MONOCYTES # BLD: 0.6 K/UL (ref 0–1)
MONOCYTES NFR BLD: 11 % (ref 5–13)
NEUTS SEG # BLD: 3.6 K/UL (ref 1.8–8)
NEUTS SEG NFR BLD: 62 % (ref 32–75)
NRBC # BLD: 0 K/UL (ref 0–0.01)
NRBC BLD-RTO: 0 PER 100 WBC
PHOSPHATE SERPL-MCNC: 2.9 MG/DL (ref 2.6–4.7)
PLATELET # BLD AUTO: 195 K/UL (ref 150–400)
PMV BLD AUTO: 10.5 FL (ref 8.9–12.9)
POTASSIUM SERPL-SCNC: 3.8 MMOL/L (ref 3.5–5.1)
RBC # BLD AUTO: 2.54 M/UL (ref 3.8–5.2)
SODIUM SERPL-SCNC: 147 MMOL/L (ref 136–145)
WBC # BLD AUTO: 5.7 K/UL (ref 3.6–11)

## 2022-10-10 PROCEDURE — 74011250637 HC RX REV CODE- 250/637: Performed by: INTERNAL MEDICINE

## 2022-10-10 PROCEDURE — 77010033678 HC OXYGEN DAILY

## 2022-10-10 PROCEDURE — 65270000046 HC RM TELEMETRY

## 2022-10-10 PROCEDURE — 92526 ORAL FUNCTION THERAPY: CPT

## 2022-10-10 PROCEDURE — 94761 N-INVAS EAR/PLS OXIMETRY MLT: CPT

## 2022-10-10 PROCEDURE — 74011250637 HC RX REV CODE- 250/637: Performed by: STUDENT IN AN ORGANIZED HEALTH CARE EDUCATION/TRAINING PROGRAM

## 2022-10-10 PROCEDURE — 85025 COMPLETE CBC W/AUTO DIFF WBC: CPT

## 2022-10-10 PROCEDURE — 97530 THERAPEUTIC ACTIVITIES: CPT | Performed by: OCCUPATIONAL THERAPIST

## 2022-10-10 PROCEDURE — 84100 ASSAY OF PHOSPHORUS: CPT

## 2022-10-10 PROCEDURE — 97535 SELF CARE MNGMENT TRAINING: CPT | Performed by: OCCUPATIONAL THERAPIST

## 2022-10-10 PROCEDURE — 80048 BASIC METABOLIC PNL TOTAL CA: CPT

## 2022-10-10 PROCEDURE — 36415 COLL VENOUS BLD VENIPUNCTURE: CPT

## 2022-10-10 PROCEDURE — 99231 SBSQ HOSP IP/OBS SF/LOW 25: CPT | Performed by: INTERNAL MEDICINE

## 2022-10-10 PROCEDURE — 83735 ASSAY OF MAGNESIUM: CPT

## 2022-10-10 RX ORDER — NYSTATIN 100000 [USP'U]/ML
500000 SUSPENSION ORAL 4 TIMES DAILY
Status: DISCONTINUED | OUTPATIENT
Start: 2022-10-10 | End: 2022-10-12 | Stop reason: HOSPADM

## 2022-10-10 RX ADMIN — NYSTATIN 500000 UNITS: 100000 SUSPENSION ORAL at 17:00

## 2022-10-10 RX ADMIN — AMLODIPINE BESYLATE 5 MG: 5 TABLET ORAL at 10:37

## 2022-10-10 RX ADMIN — GABAPENTIN 100 MG: 100 CAPSULE ORAL at 17:00

## 2022-10-10 RX ADMIN — LEVOTHYROXINE SODIUM 125 MCG: 0.12 TABLET ORAL at 10:36

## 2022-10-10 RX ADMIN — FERROUS SULFATE TAB 325 MG (65 MG ELEMENTAL FE) 325 MG: 325 (65 FE) TAB at 10:37

## 2022-10-10 RX ADMIN — NYSTATIN 500000 UNITS: 100000 SUSPENSION ORAL at 22:17

## 2022-10-10 RX ADMIN — FERROUS SULFATE TAB 325 MG (65 MG ELEMENTAL FE) 325 MG: 325 (65 FE) TAB at 16:59

## 2022-10-10 RX ADMIN — GABAPENTIN 100 MG: 100 CAPSULE ORAL at 10:37

## 2022-10-10 RX ADMIN — LEVETIRACETAM 250 MG: 250 TABLET, FILM COATED ORAL at 17:00

## 2022-10-10 RX ADMIN — ATORVASTATIN CALCIUM 80 MG: 40 TABLET, FILM COATED ORAL at 10:36

## 2022-10-10 RX ADMIN — LEVETIRACETAM 250 MG: 250 TABLET, FILM COATED ORAL at 10:36

## 2022-10-10 RX ADMIN — CETIRIZINE HYDROCHLORIDE 10 MG: 10 TABLET, FILM COATED ORAL at 10:37

## 2022-10-10 RX ADMIN — GABAPENTIN 100 MG: 100 CAPSULE ORAL at 22:17

## 2022-10-10 NOTE — PROGRESS NOTES
Problem: Dysphagia (Adult)  Goal: *Acute Goals and Plan of Care (Insert Text)  Description: Speech Pathology Goals  Initiated 10/5/2022    1. Patient will tolerate Puree/Thin Liquids without signs of aspiration or adverse effects within 7 days. MET  Outcome: Resolved/Met     SPEECH LANGUAGE PATHOLOGY DYSPHAGIA TREATMENT/DISCHARGE  Patient: Ulises Marx (29 y.o. female)  Date: 10/10/2022  Diagnosis: ALEX (acute kidney injury) ([facility] [address] [N17.9]  GIB (gastrointestinal bleeding) [K92.2]  Blood loss anemia [D50.0] <principal problem not specified>  Procedure(s) (LRB):  ESOPHAGOGASTRODUODENOSCOPY (EGD) (N/A)  COLONOSCOPY (N/A)  ESOPHAGOGASTRODUODENAL (EGD) BIOPSY (N/A)  INJECTION (N/A)  COLON BIOPSY (N/A) 3 Days Post-Op  Precautions:       ASSESSMENT:  Patient with excellent tolerance of puree/thin liquid diet. Note per chart review, puree/thin is her baseline from past admissions due to mental status, oral dysphagia, and patient preference for puree. PLAN:  -Puree/thin liquid diet. Suspect patient could manage very soft solids in home setting with supervision  Patient will be discharged from acute skilled speech therapy at this time. Rationale for discharge:  Goals achieved    Discharge Recommendations:  None     SUBJECTIVE:   Patient agreeable to PO. OBJECTIVE:   Cognitive and Communication Status:  Neurologic State: Alert  Orientation Level: Oriented to person  Cognition: Follows commands    Perception: Cues to maintain midline in standing, Tactile, Verbal, Visual    Perseveration: No perseveration noted    Safety/Judgement: Fall prevention, Decreased insight into deficits  Dysphagia Treatment:     P.O. Trials:  Patient Position: upright in bed  Vocal quality prior to P.O.: No impairment  Consistency Presented:  Thin liquid;Puree  How Presented: SLP-fed/presented;Spoon;Straw;Successive swallows     Bolus Acceptance: No impairment  Bolus Formation/Control: Impaired  Type of Impairment: Delayed  Propulsion: Delayed (# of seconds)  Oral Residue: None        Aspiration Signs/Symptoms: None                  NOMS:   The NOMS functional outcome measure was used to quantify this patient's level of swallowing impairment. Based on the NOMS, the patient was determined to be at level 3 for swallow function     NOMS Swallowing Levels:  Level 1 (CN): NPO  Level 2 (CM): NPO but takes consistency in therapy  Level 3 (CL): Takes less than 50% of nutrition p.o. and continues with nonoral feedings; and/or safe with mod cues; and/or max diet restriction  Level 4 (CK): Safe swallow but needs mod cues; and/or mod diet restriction; and/or still requires some nonoral feeding/supplements  Level 5 (CJ): Safe swallow with min diet restriction; and/or needs min cues  Level 6 (CI): Independent with p.o.; rare cues; usually self cues; may need to avoid some foods or needs extra time  Level 7 ([address] [place] [address]): Independent for all p.o.  MARK. (2003). National Outcomes Measurement System (NOMS): Adult Speech-Language Pathology User's Guide. Pain:  Pain Scale 1: Numeric (0 - 10)  Pain Intensity 1: 0       After treatment:   Patient left in no apparent distress in bed, Call bell within reach, and Nursing notified    COMMUNICATION/EDUCATION:   Patient was too confused for education. The patient's plan of care including recommendations, planned interventions, and recommended diet changes were discussed with: Registered nurse.      BRANDY Monet  Time Calculation: 15 mins

## 2022-10-10 NOTE — PROGRESS NOTES
Transition of Care Plan:    RUR: 19% GLOS:    3 LOS:     5 days     Disposition:   Home Hospice/[facility]   Follow up appointments:   None Indicated  DME needed:   Hospice to provide  Transportation at Discharge:  Stretcher transport  [address] or means to access home:      Family  IM Medicare Letter:   Last IM 10/7  Is patient a  and connected with the [place]? If yes, was [employer] transfer form completed and [facility] notified? No  Caregiver Contact:       Arnold Jimenez  [phone]  Discharge Caregiver contacted prior to discharge? Yes  Care Conference needed?:          No    [facility]  met with family, family requesting referral be sent to [facility]. Verified patient lives outside of the [facility], CM needs to confirm address Ms. Wesly Leo will be going to. Referral sent via [address] and spoke to on call Ana Domínguez. Anticipate discharge to \"home\" when hospice can admit/equipment delivered.     Valorie Barker  Management  [phone]/Available on [employer]

## 2022-10-10 NOTE — PROGRESS NOTES
Bedside and Verbal shift change report given to [facility] (oncoming nurse) by Gil Aponte RN (offgoing nurse). Report included the following information SBAR, Kardex, Intake/Output, MAR, and Cardiac Rhythm Atrial Paced .

## 2022-10-10 NOTE — PROGRESS NOTES
Gastroenterology Daily Progress Note   [facility] [place] for Dr. Tomas Cisneros)   [address] [place] [address] Dr Boyer Date: 10/4/2022     Follow up of gib    Subjective:       I discussed with RN. No overnight issues    Colon 10/7/22: Colon Findings:   CUCO: unremarkable  Rectum: normal  no mucosal lesion appreciated  Sigmoid: at ~25-30cm was a large 50i14qc ulcerated NICE-3 colon mass concerning for an infiltrative colon cancer. At the end of procedure this was biopsied cold forceps extensively with minimal bleeding and three cc of tattoo were placed submucosally ~5cm distally. Moderate diverticulosis  Descending Colon: mild diverticulosis, otherwise unremarkable. Transverse Colon: normal  no mucosal lesion appreciated  Ascending Colon: normal  no mucosal lesion appreciated  Cecum: normal  no mucosal lesion appreciated  Terminal Ileum: not intubated    * * *FINAL PATHOLOGIC DIAGNOSIS* * *     1. 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 Pending staining for H. pylori-type organisms. 2. Rectosigmoid colon, mass; biopsy:        Adenocarcinoma, moderately differentiated.      Latest Reference Range & Units 10/6/22 02:48 10/7/22 04:08 10/8/22 03:42 10/9/22 11:24 10/10/22 05:03   HGB 11.5 - 16.0 g/dL 8.0 (L) 7.9 (L) 7.9 (L) 8.4 (L) 7.6 (L)   HCT 35.0 - 47.0 % 24.9 (L) 25.2 (L) 25.3 (L) 26.3 (L) 24.3 (L)   (L): Data is abnormally low    Current Facility-Administered Medications   Medication Dose Route Frequency    dextrose 5% infusion  150 mL/hr IntraVENous CONTINUOUS    0.9% sodium chloride infusion  25 mL/hr IntraVENous CONTINUOUS    hydrALAZINE (APRESOLINE) 20 mg/mL injection 10 mg  10 mg IntraVENous Q6H PRN    0.9% sodium chloride infusion 250 mL  250 mL IntraVENous PRN    ondansetron (ZOFRAN) injection 4 mg  4 mg IntraVENous Q4H PRN    0.9% sodium chloride infusion 250 mL  250 mL IntraVENous PRN    ferrous sulfate tablet 325 mg 325 mg Oral BID WITH MEALS    fluticasone propionate (FLONASE) 50 mcg/actuation nasal spray 2 Spray  2 Spray Both Nostrils DAILY    gabapentin (NEURONTIN) capsule 100 mg  100 mg Oral TID    levothyroxine (SYNTHROID) tablet 125 mcg  125 mcg Oral DAILY    amLODIPine (NORVASC) tablet 5 mg  5 mg Oral DAILY    atorvastatin (LIPITOR) tablet 80 mg  80 mg Oral DAILY    levETIRAcetam (KEPPRA) tablet 250 mg  250 mg Oral BID    cetirizine (ZYRTEC) tablet 10 mg  10 mg Oral DAILY    acetaminophen (TYLENOL) tablet 650 mg  650 mg Oral Q6H PRN        Objective:     Visit Vitals  /65   Pulse 68   Temp 97.9 °F (36.6 °C)   Resp 14   Ht 5' 6\" (1.676 m)   Wt 75.9 kg (167 lb 5.3 oz)   SpO2 95%   BMI 27.01 kg/m²   Blood pressure 121/65, pulse 68, temperature 97.9 °F (36.6 °C), resp. rate 14, height 5' 6\" (1.676 m), weight 75.9 kg (167 lb 5.3 oz), SpO2 95 %. No intake/output data recorded. 10/08 1901 - 10/10 0700  In: 100 [P.O.:100]  Out: 1100 [Urine:1100]      Intake/Output Summary (Last 24 hours) at 10/10/2022 1103  Last data filed at 10/10/2022 0658  Gross per 24 hour   Intake 100 ml   Output 1100 ml   Net -1000 ml         Physical Exam:       General: black female pleasantly confused in nad  Chest:  CTA, No rhonchi, rales or rubs.   Heart: S1, S2, RRR  GI: Soft, NT, ND + bowel sounds  Extremities: no edema  CNS: oriented to self only       Labs:       Recent Results (from the past 24 hour(s))   CBC WITH AUTOMATED DIFF    Collection Time: 10/09/22 11:24 AM   Result Value Ref Range    WBC 6.9 3.6 - 11.0 K/uL    RBC 2.82 (L) 3.80 - 5.20 M/uL    HGB 8.4 (L) 11.5 - 16.0 g/dL    HCT 26.3 (L) 35.0 - 47.0 %    MCV 93.3 80.0 - 99.0 FL    MCH 29.8 26.0 - 34.0 PG    MCHC 31.9 30.0 - 36.5 g/dL    RDW 14.7 (H) 11.5 - 14.5 %    PLATELET 873 749 - 422 K/uL    MPV 11.1 8.9 - 12.9 FL    NRBC 0.0 0  WBC    ABSOLUTE NRBC 0.00 0.00 - 0.01 K/uL    NEUTROPHILS 66 32 - 75 %    LYMPHOCYTES 15 12 - 49 %    MONOCYTES 10 5 - 13 %    EOSINOPHILS 8 (H) 0 - 7 %    BASOPHILS 1 0 - 1 %    IMMATURE GRANULOCYTES 0 0.0 - 0.5 %    ABS. NEUTROPHILS 4.6 1.8 - 8.0 K/UL    ABS. LYMPHOCYTES 1.1 0.8 - 3.5 K/UL    ABS. MONOCYTES 0.7 0.0 - 1.0 K/UL    ABS. EOSINOPHILS 0.5 (H) 0.0 - 0.4 K/UL    ABS. BASOPHILS 0.0 0.0 - 0.1 K/UL    ABS. IMM. GRANS. 0.0 0.00 - 0.04 K/UL    DF AUTOMATED     METABOLIC PANEL, BASIC    Collection Time: 10/10/22  5:03 AM   Result Value Ref Range    Sodium 147 (H) 136 - 145 mmol/L    Potassium 3.8 3.5 - 5.1 mmol/L    Chloride 111 (H) 97 - 108 mmol/L    CO2 33 (H) 21 - 32 mmol/L    Anion gap 3 (L) 5 - 15 mmol/L    Glucose 80 65 - 100 mg/dL    BUN 5 (L) 6 - 20 MG/DL    Creatinine 0.80 0.55 - 1.02 MG/DL    BUN/Creatinine ratio 6 (L) 12 - 20      eGFR >60 >60 ml/min/1.73m2    Calcium 8.3 (L) 8.5 - 10.1 MG/DL   MAGNESIUM    Collection Time: 10/10/22  5:03 AM   Result Value Ref Range    Magnesium 1.7 1.6 - 2.4 mg/dL   PHOSPHORUS    Collection Time: 10/10/22  5:03 AM   Result Value Ref Range    Phosphorus 2.9 2.6 - 4.7 MG/DL   CBC WITH AUTOMATED DIFF    Collection Time: 10/10/22  5:03 AM   Result Value Ref Range    WBC 5.7 3.6 - 11.0 K/uL    RBC 2.54 (L) 3.80 - 5.20 M/uL    HGB 7.6 (L) 11.5 - 16.0 g/dL    HCT 24.3 (L) 35.0 - 47.0 %    MCV 95.7 80.0 - 99.0 FL    MCH 29.9 26.0 - 34.0 PG    MCHC 31.3 30.0 - 36.5 g/dL    RDW 14.6 (H) 11.5 - 14.5 %    PLATELET 098 804 - 673 K/uL    MPV 10.5 8.9 - 12.9 FL    NRBC 0.0 0  WBC    ABSOLUTE NRBC 0.00 0.00 - 0.01 K/uL    NEUTROPHILS 62 32 - 75 %    LYMPHOCYTES 18 12 - 49 %    MONOCYTES 11 5 - 13 %    EOSINOPHILS 8 (H) 0 - 7 %    BASOPHILS 1 0 - 1 %    IMMATURE GRANULOCYTES 0 0.0 - 0.5 %    ABS. NEUTROPHILS 3.6 1.8 - 8.0 K/UL    ABS. LYMPHOCYTES 1.0 0.8 - 3.5 K/UL    ABS. MONOCYTES 0.6 0.0 - 1.0 K/UL    ABS. EOSINOPHILS 0.5 (H) 0.0 - 0.4 K/UL    ABS. BASOPHILS 0.1 0.0 - 0.1 K/UL    ABS. IMM.  GRANS. 0.0 0.00 - 0.04 K/UL    DF AUTOMATED     LABRCNT(wbc:2,hgb:2,hct:2,plt:2,)  Recent Labs     10/10/22  7306 10/09/22  0302 10/08/22  0342   * 144 148*   K 3.8 4.1 2.7*   * 110* 112*   CO2 33* 33* 33*   BUN 5* 4* 5*   CREA 0.80 0.84 0.92   GLU 80 116* 93   CA 8.3* 8.4* 8.9   MG 1.7 1.7 1.9   PHOS 2.9 1.5* 2.0*   LABRCNT(sgot:3,gpt:3,ap:3,tbiL:3,TP:3,ALB:3,GLOB:3,ggt:3,aml:3,amyp:3,lpse:3,hlpse:3)No results for input(s): INR, PTP, APTT, INREXT, INREXT in the last 72 hours. No results for input(s): AP, TBIL, TP, ALB, GLOB, GGT, AML, LPSE in the last 72 hours. No lab exists for component: SGOT, GPT, AMYP, HLPSE  BRIEFLAB(B12,FOL,FOLAT,RBCF)  Lab Results   Component Value Date/Time    Folate 5.2 10/05/2022 02:50 AM   LABRCNT(CPK:3,CpKMB:3,ckndx:3,troiq:3)No components found for: GLPOCBRIEFLAB(CHOL,CHOLX,CHOLP,CHLST,CHOLV,HDL,HDLC,HDLP,LDL,DLDL,LDLC,DLDLP,TGL,TGLX,TRIGL,TRIGP,CHHD,CHHDX)No results for input(s): PH, PCO2, PO2 in the last 72 hours. LABRCNT(CPK:3,CpKMB:3,ckndx:3,troiq:3)[facility] [place]  No results for input(s): CPK, CKNDX, TROIQ in the last 72 hours. No lab exists for component: CPARABELLABM[facility] [place]      Problem List:     Active Problems:    Arthritis ()      Lumbar stenosis (3/13/2012)      Ischemic stroke ([address] (9/24/2022)      Blood loss anemia (10/4/2022)      GIB (gastrointestinal bleeding) (10/4/2022)      ALEX (acute kidney injury) ([facility] [address] (10/4/2022)      Impression:  Acute blood loss Anemia  Melena  Anticoagulation on hold  CVA  HTN  AMS         Plan:  Path: moderately differentiated adenocarcinoma. Hgb down a little but no overt bleeding. Eliquis remains on hold. I discussed path results with daughter, Leo Green, via phone. Palliative consulted. I brought up the option of Hospice. Questions answered.   We will sign off and be available to see again on request.         [facility]  10/10/2022  [id number] [facility], [address] [facility] [id number] [place] [address] [place]: [phone]

## 2022-10-10 NOTE — HOSPICE
[facility] RN note:  consult noted. Reviewing chart. Discussed pt with Dr Little Bai and palliative MD Eliezer Chavez. Will address shortl    Met with pt's two daughters outside pt room. Pt has not yet been informed of new Dx, wanting to speak with Dr Little Bia who plans to meet with family. Provided general information about hospice philosophy and services. Pt lives outside [address] [place] [address] as they love in [address] [facility] [address]. Family requesting referral be sent to [facility]. RIK Menard aware to send referral.  Information packet provided with 24hr contact information. Thank you for the opportunity to care for this pt and family. Please contact hospice at [phone] with any questions or concerns.

## 2022-10-10 NOTE — PROGRESS NOTES
PT visit attempted pt had just worked with OT and was exhausted. Will defer at this time and continue to follow.

## 2022-10-10 NOTE — PROGRESS NOTES
Problem: Self Care Deficits Care Plan (Adult)  Goal: *Acute Goals and Plan of Care (Insert Text)  Description: FUNCTIONAL STATUS PRIOR TO ADMISSION: unable to accurately obtain from pt due to pleasant confusion, pt reports walking with RW or using wheelchair, sponge bathes with assist, able to feed and groom on her own, needs assist with all other ADLS    HOME SUPPORT PRIOR TO ADMISSION: The patient lived with daughter whom is her paid Medicaid aide per chart review. Was getting [facility] services for therapy. Occupational Therapy Goals:  Initiated 10/5/2022  1. Patient will perform grooming seated with supervision/set-up within 7 days. 2. Patient will perform upper body dressing with minimal assistance within 7 days. 3. Patient will perform toileting with moderate assistance  within 7 days. 4. Patient will transfer from bedside commode or toilet with minimal assist using the least restrictive device and appropriate durable medical equipment within 7 days. Outcome: Not Met    OCCUPATIONAL THERAPY TREATMENT  Patient: Lazarus Salmons (14 y.o. female)  Date: 10/10/2022  Diagnosis: ALEX (acute kidney injury) ([facility] [address] [N17.9]  GIB (gastrointestinal bleeding) [K92.2]  Blood loss anemia [D50.0] <principal problem not specified>  Procedure(s) (LRB):  ESOPHAGOGASTRODUODENOSCOPY (EGD) (N/A)  COLONOSCOPY (N/A)  ESOPHAGOGASTRODUODENAL (EGD) BIOPSY (N/A)  INJECTION (N/A)  COLON BIOPSY (N/A) 3 Days Post-Op  Precautions:    Chart, occupational therapy assessment, plan of care, and goals were reviewed. ASSESSMENT  Patient presented drowsy and confused, but was agreeable and able to actively participate in OT. Overall she was min to max A for bed mobility, stood with max A and she was supervision/setup for seated grooming. Her balance seated was fair to poor, occasionally demonstrating an anterior lean preference. Upon standing her balance was poor, with patient demonstrating a flexed posture and anterior lean preference. Patient required cueing to maintain attention to task and to assist with processing 1 step commands. At this time the patient remains limited by her L hemiparesis, balance impairments, impaired perception of midline, GW, confusion, decreased activity tolerance, decreased safety awareness, decreased attention and decreased command following. She will continue to benefit from acute OT and will also need SNF rehab after discharge. PLAN :  Patient continues to benefit from skilled intervention to address the above impairments. Continue treatment per established plan of care. to address goals. Recommendation for discharge: (in order for the patient to meet his/her long term goals)  Therapy up to 5 days/week in SNF setting           OBJECTIVE DATA SUMMARY:   Cognitive/Behavioral Status:  Neurologic State: Drowsy; Confused  Orientation Level: Oriented to person;Disoriented to place; Disoriented to situation;Disoriented to time  Cognition: Decreased attention/concentration;Decreased command following;Poor safety awareness  Perception: Cues to maintain midline in sitting;Cues to maintain midline in standing; Tactile;Verbal;Visual (intermittent anterior lean preference)  Perseveration: No perseveration noted       Functional Mobility and Transfers for ADLs:  Bed Mobility:  Rolling: Minimum assistance;Assist x1  Supine to Sit: Minimum assistance;Assist x1  Sit to Supine:  Moderate assistance;Assist x1  Scooting: Maximum assistance;Assist x1    Transfers:  Sit to Stand: Maximum assistance;Assist x1          Balance:  Sitting: Impaired (intermittent anterior lean)  Sitting - Static: Fair (occasional)  Sitting - Dynamic: Poor (constant support)  Standing: Impaired (constant flexed posture, unable to obtain an upright position, leaning anteriorly.)  Standing - Static: Poor;Constant support  Standing - Dynamic : Poor;Constant support    ADL Intervention:  Grooming  Position Performed: Seated edge of bed  Washing Face: Supervision;Set-up  Washing Hands: Supervision;Set-up  Cues: Verbal cues provided     Cognitive Retraining  Attention to Task: Distractibility; Single task  Cues: Tactile cues provided;Verbal cues provided;Visual cues provided    Therapeutic Exercises:   Trunk strengthening to address sitting balance, via multidirectional resistance provided with patient focusing on maintaining midline. Performed 1 set, 10 Reps shoulder abduction and scaption. Active assisted bilaterally, but much more assistance required for LUE due to recently CVA. Seated  active assisted anterior reaching performed for 1 set, 5 reps bilaterally. Pain:  No complaint of pain    Activity Tolerance:   Poor  Please refer to the flowsheet for vital signs taken during this treatment.     After treatment patient left in no apparent distress:   Supine in bed and Call bell within reach    COMMUNICATION/COLLABORATION:   The patients plan of care was discussed with: Registered Nurse    Megan Raymond OTR/L  Time Calculation: 45 mins

## 2022-10-10 NOTE — PROGRESS NOTES
Problem: Patient Education: Go to Patient Education Activity  Goal: Patient/Family Education  Outcome: Progressing Towards Goal     Problem: Patient Education: Go to Patient Education Activity  Goal: Patient/Family Education  Outcome: Progressing Towards Goal     Problem: Patient Education: Go to Patient Education Activity  Goal: Patient/Family Education  Outcome: Progressing Towards Goal     Problem: Pressure Injury - Risk of  Goal: *Prevention of pressure injury  Description: Document Manuel Scale and appropriate interventions in the flowsheet. Outcome: Progressing Towards Goal  Note: Pressure Injury Interventions:  Sensory Interventions: Assess changes in LOC, Keep linens dry and wrinkle-free    Moisture Interventions: Absorbent underpads, Apply protective barrier, creams and emollients, Internal/External urinary devices    Activity Interventions: Assess need for specialty bed, Increase time out of bed    Mobility Interventions: HOB 30 degrees or less, Turn and reposition approx. every two hours(pillow and wedges)    Nutrition Interventions: Offer support with meals,snacks and hydration    Friction and Shear Interventions: Apply protective barrier, creams and emollients, Minimize layers                Problem: Patient Education: Go to Patient Education Activity  Goal: Patient/Family Education  Outcome: Progressing Towards Goal     Problem: Falls - Risk of  Goal: *Absence of Falls  Description: Document John Fall Risk and appropriate interventions in the flowsheet.   Outcome: Progressing Towards Goal  Note: Fall Risk Interventions:  Mobility Interventions: Bed/chair exit alarm    Mentation Interventions: Bed/chair exit alarm, Door open when patient unattended    Medication Interventions: Bed/chair exit alarm    Elimination Interventions: Bed/chair exit alarm, Call light in reach              Problem: Patient Education: Go to Patient Education Activity  Goal: Patient/Family Education  Outcome: Progressing Towards Goal

## 2022-10-10 NOTE — PROGRESS NOTES
Hospitalist Progress Note    NAME: Niranjan De Luna   :  1928   MRN:  [id number]       Assessment / Plan:  Acute blood loss anemia, likely secondary to gastrointestinal hemorrhage  -GI consulted  -Trend H&H  -s/p 2 units PRBC, goal hgb > 7  - Hgb slightly down-trending, 7.6  -ok to Dc protonix  -Received 2 units PRBC. Hgb remains stable at 7.9  Monitor  -Hold eliquis, appears it had been resumed from recent DC  -Patient with BECKY, Will hold off on exogenous iron given she is receiving blood  -EGD/Colon showed a colonic mass which is suspicious for cancer. Biopsy showed Adenocarcinoma, moderately differentiated. -Palliative consulted, still remains full code. Would benefit from hospice at this point. Have consulted hospice  -Remain off AC      Recent CVA  S/p TNK  Unable to undergo MRI due to pacer  C/w high intensity statin  Remains on keppra for seizure ppx  Diet puree with thin liquids   SLP consulted    Hypernatremia  Hypokalemia  Hypophosphatemia  Na worsened to 147 Increase rate of D5W to 150 cc  Monitor and replete PRN    PAF  S/p PPM  Holding eliquis    Acute kidney injury  (resolved)  -Now on D5 W  -Likely from decreased volume from presumed GIB    Hypertension  -Continue home Norvasc    Hyperlipidemia  -Continue home atorvastatin    Hypothyroidism  -Continue home Synthroid    Allergies  -Continue home Claritin, fluticasone and Symbicort    PPX SCD    Code status: Full    HELENE: 10/12  Barriers: Disposition, palliative       Subjective:     Chief Complaint / Reason for Physician Visit  Patient seen and examined at the bedside. Denies any complaints. Says she feels well. I called daughter and updated her. Agreeable to discuss care with hospice. She currently has no complaints and no pain.          Review of Systems:  Symptom Y/N Comments  Symptom Y/N Comments   Fever/Chills    Chest Pain     Poor Appetite    Edema     Cough    Abdominal Pain     Sputum    Joint Pain     SOB/HAWKINS    Pruritis/Rash Nausea/vomit    Tolerating PT/OT     Diarrhea    Tolerating Diet     Constipation    Other       Could NOT obtain due to:      Objective:     VITALS:   Last 24hrs VS reviewed since prior progress note. Most recent are:  Patient Vitals for the past 24 hrs:   Temp Pulse Resp BP SpO2   10/10/22 1033 97.9 °F (36.6 °C) 68 14 -- 95 %   10/10/22 0258 97.9 °F (36.6 °C) 64 14 121/65 96 %   10/09/22 2317 98.1 °F (36.7 °C) 66 20 (!) 148/55 96 %   10/09/22 2005 97.1 °F (36.2 °C) 77 18 (!) 145/68 93 %   10/09/22 1630 98.3 °F (36.8 °C) 61 18 136/70 98 %         Intake/Output Summary (Last 24 hours) at 10/10/2022 1056  Last data filed at 10/10/2022 0658  Gross per 24 hour   Intake 100 ml   Output 1100 ml   Net -1000 ml          I had a face to face encounter and independently examined this patient on 10/10/2022, as outlined below:  PHYSICAL EXAM:  General: WD, WN. Alert, cooperative, no acute distress    EENT:  EOMI. Anicteric sclerae. MMM  Resp:  CTA bilaterally, no wheezing or rales. No accessory muscle use  CV:  Regular  rhythm,  No edema  GI:  Soft, Non distended, Non tender. +Bowel sounds  Neurologic:  Alert and oriented X 3, normal speech,   Psych:   Good insight. Not anxious nor agitated  Skin:  No rashes. No jaundice    Reviewed most current lab test results and cultures  YES  Reviewed most current radiology test results   YES  Review and summation of old records today    NO  Reviewed patient's current orders and MAR    YES  PMH/SH reviewed - no change compared to H&P  ________________________________________________________________________  Care Plan discussed with:    Comments   Patient     Family      RN     Care Manager     Consultant                        Multidiciplinary team rounds were held today with , nursing, pharmacist and clinical coordinator. Patient's plan of care was discussed; medications were reviewed and discharge planning was addressed. ________________________________________________________________________  Total NON critical care TIME:  40  Minutes    Total CRITICAL CARE TIME Spent:   Minutes non procedure based      Comments   >50% of visit spent in counseling and coordination of care     ________________________________________________________________________  Amandeep Middleton MD     Procedures: see electronic medical records for all procedures/Xrays and details which were not copied into this note but were reviewed prior to creation of Plan. LABS:  I reviewed today's most current labs and imaging studies.   Pertinent labs include:  Recent Labs     10/10/22  0503 10/09/22  1124 10/08/22  0342   WBC 5.7 6.9 6.2   HGB 7.6* 8.4* 7.9*   HCT 24.3* 26.3* 25.3*    231 222       Recent Labs     10/10/22  0503 10/09/22  0302 10/08/22  0342   * 144 148*   K 3.8 4.1 2.7*   * 110* 112*   CO2 33* 33* 33*   GLU 80 116* 93   BUN 5* 4* 5*   CREA 0.80 0.84 0.92   CA 8.3* 8.4* 8.9   MG 1.7 1.7 1.9   PHOS 2.9 1.5* 2.0*         Signed: Amandeep Middleton MD

## 2022-10-10 NOTE — CONSULTS
Palliative Medicine Consult  Akil: [phone] ([phone])    Patient Name: Fanny Harrell  YOB: 1928    Date of Initial Consult: 10/7/2022  Reason for Consult: Care decisions   Requesting Provider: Dr. Val Arzola  Primary Care Physician: Sybil Vela MD     SUMMARY:   Fanny Harrell is a 80 y.o. 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 Ms. Mayfield Asp PCP referred her to hospital d/t dark stools and Hgb of 6.5.. Course of hospitalization: Received 1 unit prbcs initially, HGB stable. 10/7 EGD + mild diffuse erythema and congestion in stomach, but no bleeding, +hiatal hernia. Colonoscopy + sigmoid mass concerning for infiltrative colon cancer      Current medical issues leading to Palliative Medicine involvement include: care decisions. Social: At baseline alert/oriented fully, uses 2L O2 and a walker. - she has 6 children and lives w/ dtr Rambo Grimm. Can do all ADLs, did not cook or drive. PALLIATIVE DIAGNOSES:   New cancer diagnosis, Colon Cancer  Palliative care encounter  Fatigue  Weakness, generalized  DNR Discussion  Goals of care discussion       PLAN:   Pathology has returned: adenocarcinoma  Consult already discussed with Rambo brand by attending MD and hospice has been consulted. Patient sleeping at time of my visit. Call placed to dtHay peng.   She is on her way to hospital and will be here at 230pm   She is aware of Dr. Delio Balbuena and BECCA's recommendation for hospice and is in agreement with meeting with hospice team.  She isn't sure if anyone has told her mom about her diagnosis, but she will talk to her about it when she comes to the hospital. (Offered, but she did not need support with that conversation)  She is willing to meet with hospice team later today and I notified hospice (Neftaly Cardona RN) that family will be at bedside at 230pm       [address] / TREATMENT PREFERENCES:     GOALS OF CARE: TBD       TREATMENT PREFERENCES:   Code Status: Full Code    Patient and family's personal goals include:to get home      Advance Care Planning:  [x] [facility] Interdisciplinary Team has updated the ACP Navigator with Health Care Decision Maker and Patient Capacity        Advance Care Planning 9/24/2022   Confirm Advance Directive None               Other:    As far as possible, the palliative care team has discussed with patient / health care proxy about goals of care / treatment preferences for patient. HISTORY:     History obtained from: pt, chart, staff     CHIEF COMPLAINT: Im thirsty    HPI/SUBJECTIVE:    The patient is:   [x] Verbal and participatory  [] Non-participatory due to:     Pt fatigued, thirsty.  Denied pain/sob    Clinical Pain Assessment (nonverbal scale for severity on nonverbal patients):              Duration: for how long has pt been experiencing pain (e.g., 2 days, 1 month, years)  Frequency: how often pain is an issue (e.g., several times per day, once every few days, constant)     FUNCTIONAL ASSESSMENT:     Palliative Performance Scale (PPS):          PSYCHOSOCIAL/SPIRITUAL SCREENING:     Palliative IDT has assessed this patient for cultural preferences / practices and a referral made as appropriate to needs (Cultural Services, Patient Advocacy, Ethics, etc.)    Any spiritual / Uatsdin concerns:  [] Yes /  [x] No   If \"Yes\" to discuss with pastoral care during IDT     Does caregiver feel burdened by caring for their loved one:   [] Yes /  [x] No /  [] No Caregiver Present    If \"Yes\" to discuss with social work during IDT    Anticipatory grief assessment:   [x] Normal / [] Maladaptive     If \"Maladaptive\" to discuss with social work during IDT    ESAS Anxiety:      ESAS Depression:          REVIEW OF SYSTEMS:     Positive and pertinent negative findings in ROS are noted above in HPI. The following systems were [x] reviewed / [] unable to be reviewed as noted in HPI  Other findings are noted below. Systems: constitutional, ears/nose/mouth/throat, respiratory, gastrointestinal, genitourinary, musculoskeletal, integumentary, neurologic, psychiatric, endocrine. Positive findings noted below. Modified ESAS Completed by: provider                                   Stool Occurrence(s): 2        PHYSICAL EXAM:     From RN flowsheet:  Wt Readings from Last 3 Encounters:   10/04/22 75.9 kg (167 lb 5.3 oz)   09/28/22 75.9 kg (167 lb 5.3 oz)   05/23/22 84 kg (185 lb 3 oz)     Blood pressure 121/65, pulse 68, temperature 97.9 °F (36.6 °C), resp. rate 14, height 5' 6\" (1.676 m), weight 75.9 kg (167 lb 5.3 oz), SpO2 95 %.     Pain Scale 1: Numeric (0 - 10)  Pain Intensity 1: 0                 Last bowel movement, if known:     Constitutional: awake, alert, oriented, NAD  Eyes: pupils equal, anicteric  ENMT: no nasal discharge, moist mucous membranes  Respiratory: breathing not labored  Skin: warm, dry  Neurologic: following commands, moving all extremities  Psychiatric: full affect, no hallucinations       HISTORY:     Active Problems:    Arthritis ()      Lumbar stenosis (3/13/2012)      Ischemic stroke ([facility] [address] (9/24/2022)      Blood loss anemia (10/4/2022)      GIB (gastrointestinal bleeding) (10/4/2022)      ALEX (acute kidney injury) ([address] (10/4/2022)    Past Medical History:   Diagnosis Date    Arthritis     Diabetes ([facility] [address]     Hard of hearing     Hypertension     Hypothyroidism     Poor historian       Past Surgical History:   Procedure Laterality Date    COLONOSCOPY N/A 10/7/2022    COLONOSCOPY performed by Radha Waller MD at [facility] ENDOSCOPY    HX TUBAL LIGATION      [facility] THROMB INFUSION INTRACRANIAL  5/18/2022      History reviewed. No pertinent family history. History reviewed, no pertinent family history.   Social History     Tobacco Use    Smoking status: Former    Smokeless tobacco: Not on file   Substance Use Topics    Alcohol use: No     No Known Allergies   Current Facility-Administered Medications   Medication Dose Route Frequency    dextrose 5% infusion  150 mL/hr IntraVENous CONTINUOUS    0.9% sodium chloride infusion  25 mL/hr IntraVENous CONTINUOUS    hydrALAZINE (APRESOLINE) 20 mg/mL injection 10 mg  10 mg IntraVENous Q6H PRN    0.9% sodium chloride infusion 250 mL  250 mL IntraVENous PRN    ondansetron (ZOFRAN) injection 4 mg  4 mg IntraVENous Q4H PRN    0.9% sodium chloride infusion 250 mL  250 mL IntraVENous PRN    ferrous sulfate tablet 325 mg  325 mg Oral BID WITH MEALS    fluticasone propionate (FLONASE) 50 mcg/actuation nasal spray 2 Spray  2 Spray Both Nostrils DAILY    gabapentin (NEURONTIN) capsule 100 mg  100 mg Oral TID    levothyroxine (SYNTHROID) tablet 125 mcg  125 mcg Oral DAILY    amLODIPine (NORVASC) tablet 5 mg  5 mg Oral DAILY    atorvastatin (LIPITOR) tablet 80 mg  80 mg Oral DAILY    levETIRAcetam (KEPPRA) tablet 250 mg  250 mg Oral BID    cetirizine (ZYRTEC) tablet 10 mg  10 mg Oral DAILY    acetaminophen (TYLENOL) tablet 650 mg  650 mg Oral Q6H PRN          LAB AND IMAGING FINDINGS:     Lab Results   Component Value Date/Time    WBC 5.7 10/10/2022 05:03 AM    HGB 7.6 (L) 10/10/2022 05:03 AM    PLATELET 710 06/50/9770 05:03 AM     Lab Results   Component Value Date/Time    Sodium 147 (H) 10/10/2022 05:03 AM    Potassium 3.8 10/10/2022 05:03 AM    Chloride 111 (H) 10/10/2022 05:03 AM    CO2 33 (H) 10/10/2022 05:03 AM    BUN 5 (L) 10/10/2022 05:03 AM    Creatinine 0.80 10/10/2022 05:03 AM    Calcium 8.3 (L) 10/10/2022 05:03 AM    Magnesium 1.7 10/10/2022 05:03 AM    Phosphorus 2.9 10/10/2022 05:03 AM      Lab Results   Component Value Date/Time Alk. phosphatase 77 10/06/2022 02:48 AM    Protein, total 5.3 (L) 10/06/2022 02:48 AM    Albumin 2.2 (L) 10/06/2022 02:48 AM    Globulin 3.1 10/06/2022 02:48 AM     Lab Results   Component Value Date/Time    INR 1.1 09/24/2022 02:12 PM    Prothrombin time 11.4 (H) 09/24/2022 02:12 PM      Lab Results   Component Value Date/Time    Iron 25 (L) 10/05/2022 02:50 AM    TIBC 149 (L) 10/05/2022 02:50 AM    Iron % saturation 17 (L) 10/05/2022 02:50 AM    Ferritin 35 10/06/2022 02:48 AM      No results found for: PH, PCO2, PO2  No components found for: GLPOC   No results found for: CPK, CKMB             Total time:  Counseling / coordination time, spent as noted above:  > 50% counseling / coordination?:     Prolonged service was provided for  []30 min   []75 min in face to face time in the presence of the patient, spent as noted above. Time Start:   Time End:   Note: this can only be billed with 23418 (initial) or 18785 (follow up). If multiple start / stop times, list each separately.

## 2022-10-11 PROCEDURE — 65270000046 HC RM TELEMETRY

## 2022-10-11 PROCEDURE — 74011250636 HC RX REV CODE- 250/636: Performed by: INTERNAL MEDICINE

## 2022-10-11 PROCEDURE — 94760 N-INVAS EAR/PLS OXIMETRY 1: CPT

## 2022-10-11 PROCEDURE — 74011250637 HC RX REV CODE- 250/637: Performed by: INTERNAL MEDICINE

## 2022-10-11 PROCEDURE — 74011250637 HC RX REV CODE- 250/637: Performed by: STUDENT IN AN ORGANIZED HEALTH CARE EDUCATION/TRAINING PROGRAM

## 2022-10-11 PROCEDURE — 77010033678 HC OXYGEN DAILY

## 2022-10-11 RX ORDER — NYSTATIN 100000 [USP'U]/ML
500000 SUSPENSION ORAL 4 TIMES DAILY
Qty: 473 ML | Refills: 0 | Status: SHIPPED | OUTPATIENT
Start: 2022-10-11

## 2022-10-11 RX ADMIN — NYSTATIN 500000 UNITS: 100000 SUSPENSION ORAL at 14:12

## 2022-10-11 RX ADMIN — LEVETIRACETAM 250 MG: 250 TABLET, FILM COATED ORAL at 11:12

## 2022-10-11 RX ADMIN — LEVETIRACETAM 250 MG: 250 TABLET, FILM COATED ORAL at 18:43

## 2022-10-11 RX ADMIN — GABAPENTIN 100 MG: 100 CAPSULE ORAL at 11:12

## 2022-10-11 RX ADMIN — NYSTATIN 500000 UNITS: 100000 SUSPENSION ORAL at 18:43

## 2022-10-11 RX ADMIN — FERROUS SULFATE TAB 325 MG (65 MG ELEMENTAL FE) 325 MG: 325 (65 FE) TAB at 11:12

## 2022-10-11 RX ADMIN — FERROUS SULFATE TAB 325 MG (65 MG ELEMENTAL FE) 325 MG: 325 (65 FE) TAB at 18:42

## 2022-10-11 RX ADMIN — HYDRALAZINE HYDROCHLORIDE 10 MG: 20 INJECTION INTRAMUSCULAR; INTRAVENOUS at 22:35

## 2022-10-11 RX ADMIN — LEVOTHYROXINE SODIUM 125 MCG: 0.12 TABLET ORAL at 11:12

## 2022-10-11 RX ADMIN — FLUTICASONE PROPIONATE 2 SPRAY: 50 SPRAY, METERED NASAL at 11:12

## 2022-10-11 RX ADMIN — GABAPENTIN 100 MG: 100 CAPSULE ORAL at 18:43

## 2022-10-11 RX ADMIN — AMLODIPINE BESYLATE 5 MG: 5 TABLET ORAL at 11:12

## 2022-10-11 RX ADMIN — DEXTROSE MONOHYDRATE 125 ML/HR: 50 INJECTION, SOLUTION INTRAVENOUS at 02:08

## 2022-10-11 RX ADMIN — DEXTROSE MONOHYDRATE 125 ML/HR: 50 INJECTION, SOLUTION INTRAVENOUS at 18:54

## 2022-10-11 RX ADMIN — NYSTATIN 500000 UNITS: 100000 SUSPENSION ORAL at 11:12

## 2022-10-11 RX ADMIN — DEXTROSE MONOHYDRATE 125 ML/HR: 50 INJECTION, SOLUTION INTRAVENOUS at 11:11

## 2022-10-11 RX ADMIN — ATORVASTATIN CALCIUM 80 MG: 40 TABLET, FILM COATED ORAL at 11:12

## 2022-10-11 RX ADMIN — NYSTATIN 500000 UNITS: 100000 SUSPENSION ORAL at 22:17

## 2022-10-11 RX ADMIN — GABAPENTIN 100 MG: 100 CAPSULE ORAL at 22:17

## 2022-10-11 RX ADMIN — CETIRIZINE HYDROCHLORIDE 10 MG: 10 TABLET, FILM COATED ORAL at 11:12

## 2022-10-11 NOTE — PROGRESS NOTES
Hospitalist Progress Note    NAME: Caterina Flores   :  1928   MRN:  [id number]       Assessment / Plan:  Acute blood loss anemia, likely secondary to gastrointestinal hemorrhage  -GI consulted  -Trend H&H  -s/p 2 units PRBC, goal hgb > 7  - Hgb slightly down-trending, 7.6  -ok to Dc protonix  -Received 2 units PRBC. Hgb remains stable a  -Hold eliquis, appears it had been resumed from recent DC  -Patient with BECKY, Will hold off on exogenous iron given she is receiving blood  -EGD/Colon showed a colonic mass which is suspicious for cancer. Biopsy showed Adenocarcinoma, moderately differentiated. -Family agreed for home with hospice. Patient agreed as well  -Remain off AC        Recent CVA  S/p TNK  Unable to undergo MRI due to pacer  C/w high intensity statin  Remains on keppra for seizure ppx  Diet puree with thin liquids   SLP consulted     Hypernatremia  Hypokalemia  Hypophosphatemia  Patient to be discharged home with hospice     PAF  S/p PPM  Holding eliquis, will not resume    Acute kidney injury  (resolved)  -Likely from decreased volume from presumed GIB    Hypertension  -Continue home Norvasc    Hyperlipidemia  -Continue home atorvastatin    Hypothyroidism  -Continue home Synthroid    Allergies  -Continue home Claritin, fluticasone and Symbicort    PPX SCD    Code status: Full    HELENE: 10/12  Barriers: Awaiting transport to home with hospice       Subjective:     Chief Complaint / Reason for Physician Visit  Patient seen and examined at the bedside. Discussion was had with both the patient and family yesterday. They are in agreement to take the patient home with hospice. All questions addressed. Currently awaiting transport which appears will occur tomorrow. Patient  currently has no complaints and no pain.          Review of Systems:  Symptom Y/N Comments  Symptom Y/N Comments   Fever/Chills    Chest Pain     Poor Appetite    Edema     Cough    Abdominal Pain     Sputum    Joint Pain SOB/HAWKINS    Pruritis/Rash     Nausea/vomit    Tolerating PT/OT     Diarrhea    Tolerating Diet     Constipation    Other       Could NOT obtain due to:      Objective:     VITALS:   Last 24hrs VS reviewed since prior progress note. Most recent are:  Patient Vitals for the past 24 hrs:   Temp Pulse Resp BP SpO2   10/11/22 1039 97.6 °F (36.4 °C) (!) 54 18 (!) 131/54 96 %   10/11/22 0722 97.6 °F (36.4 °C) 60 18 (!) 143/76 90 %   10/11/22 0347 97.3 °F (36.3 °C) 61 18 (!) 140/62 96 %   10/11/22 0012 98.2 °F (36.8 °C) 66 20 (!) 134/94 93 %   10/10/22 2004 98.9 °F (37.2 °C) 64 20 (!) 121/47 95 %   10/10/22 1641 98.8 °F (37.1 °C) 65 17 (!) 159/70 95 %         Intake/Output Summary (Last 24 hours) at 10/11/2022 1305  Last data filed at 10/11/2022 1115  Gross per 24 hour   Intake 920 ml   Output 2050 ml   Net -1130 ml          I had a face to face encounter and independently examined this patient on 10/11/2022, as outlined below:  PHYSICAL EXAM:  General: WD, WN. Alert, cooperative, no acute distress    EENT:  EOMI. Anicteric sclerae. MMM  Resp:  CTA bilaterally, no wheezing or rales. No accessory muscle use  CV:  Regular  rhythm,  No edema  GI:  Soft, Non distended, Non tender. +Bowel sounds  Neurologic:  Alert and oriented X 3, normal speech,   Psych:   Good insight. Not anxious nor agitated  Skin:  No rashes. No jaundice    Reviewed most current lab test results and cultures  YES  Reviewed most current radiology test results   YES  Review and summation of old records today    NO  Reviewed patient's current orders and MAR    YES  PMH/SH reviewed - no change compared to H&P  ________________________________________________________________________  Care Plan discussed with:    Comments   Patient     Family      RN     Care Manager     Consultant                        Multidiciplinary team rounds were held today with , nursing, pharmacist and clinical coordinator.   Patient's plan of care was discussed; medications were reviewed and discharge planning was addressed. ________________________________________________________________________  Total NON critical care TIME:  40  Minutes    Total CRITICAL CARE TIME Spent:   Minutes non procedure based      Comments   >50% of visit spent in counseling and coordination of care     ________________________________________________________________________  Lakisha Phillips MD     Procedures: see electronic medical records for all procedures/Xrays and details which were not copied into this note but were reviewed prior to creation of Plan. LABS:  I reviewed today's most current labs and imaging studies.   Pertinent labs include:  Recent Labs     10/10/22  0503 10/09/22  1124   WBC 5.7 6.9   HGB 7.6* 8.4*   HCT 24.3* 26.3*    231       Recent Labs     10/10/22  0503 10/09/22  0302   * 144   K 3.8 4.1   * 110*   CO2 33* 33*   GLU 80 116*   BUN 5* 4*   CREA 0.80 0.84   CA 8.3* 8.4*   MG 1.7 1.7   PHOS 2.9 1.5*         Signed: Lakisha Phillips MD

## 2022-10-11 NOTE — PROGRESS NOTES
Transition of Care Plan:    RUR: 19%  Disposition: Home with family and [address] [place] [address]  Follow up appointments: hospice  DME needed: per hospice- hospital bed  Transportation at Discharge: NAZARIO Cervantes [place]juan or means to access home:   daughter      IM Medicare Letter: delivered 10/11/22  Is patient a  and connected with the [place]? N/A     If yes, was [employer] transfer form completed and [facility] notified? Caregiver Contact:Ellen Davis Distance daughter [phone]  Discharge Caregiver contacted prior to discharge? yes  Care Conference needed?:        no    CM faxed referral/orders to [address] [place] [address]# [phone] and they can accept patient for admission tomorrow and will have DME. hospital bed delivered in am - patient to be picked up by NAZARIO KEITA at 10 am- daughter in agreement with plan. Medicare pt has received, reviewed, and signed 2nd  letter informing them of their right to appeal the discharge. Signed copied has been placed on pt bedside chart.   Michael Huff MSW

## 2022-10-11 NOTE — DISCHARGE SUMMARY
Hospitalist Discharge Summary     Patient ID:  Zaria Barker  [id number]  37 y.o.  9/17/1928    PCP on record: Fang Keller MD    Admit date: 10/4/2022  Discharge date and time: 10/11/2022      DISCHARGE DIAGNOSIS:      Adenocarcinoma of the colon  Anemia  History of CVA    CONSULTATIONS:  IP CONSULT TO HOSPITALIST  IP CONSULT TO GASTROENTEROLOGY  IP CONSULT TO PALLIATIVE CARE - PROVIDER    Excerpted HPI from H&P of Merritt Comer, DO:    80 y.o. female with pertinent medical hx of recent GI bleed presented with dark stools. Her outpatient hemoglobin was 6.5, and she was sent here by her PCP. Patient did not really have any severe anemia symptoms in addition, her daughter was stating that her urine had a turbid color to it from her chronic indwelling Barajas today.  ______________________________________________________________________  DISCHARGE SUMMARY/HOSPITAL COURSE:  for full details see H&P, daily progress notes, labs, consult notes. Acute blood loss anemia, likely secondary to gastrointestinal hemorrhage  -GI consulted  -Trend H&H  -s/p 2 units PRBC, goal hgb > 7  - Hgb slightly down-trending, 7.6  -ok to Dc protonix  -Received 2 units PRBC. Hgb remains stable a  -Hold eliquis, appears it had been resumed from recent DC  -Patient with BECKY, Will hold off on exogenous iron given she is receiving blood  -EGD/Colon showed a colonic mass which is suspicious for cancer. Biopsy showed Adenocarcinoma, moderately differentiated. -Family agreed for home with hospice.   Patient agreed as well  -Remain off AC        Recent CVA  S/p TNK  Unable to undergo MRI due to pacer  C/w high intensity statin  Remains on keppra for seizure ppx  Diet puree with thin liquids   SLP consulted     Hypernatremia  Hypokalemia  Hypophosphatemia  Patient to be discharged home with hospice     PAF  S/p PPM  Holding eliquis, will not resume    Acute kidney injury  (resolved)  -Likely from decreased volume from presumed GIB    Hypertension  -Continue home Norvasc    Hyperlipidemia  -Continue home atorvastatin    Hypothyroidism  -Continue home Synthroid    Allergies  -Continue home Claritin, fluticasone and Symbicort      _______________________________________________________________________  Patient seen and examined by me on discharge day. Pertinent Findings:  Gen:    Not in distress  Chest: Clear lungs  CVS:   Regular rhythm. No edema  Abd:  Soft, not distended, not tender  Neuro:  Alert, Oriented x 4, grossly non focal exam  _______________________________________________________________________  DISCHARGE MEDICATIONS:   Current Discharge Medication List        START taking these medications    Details   nystatin (MYCOSTATIN) 100,000 unit/mL suspension Take 5 mL by mouth four (4) times daily. swish and spit  Qty: 473 mL, Refills: 0  Start date: 10/11/2022           CONTINUE these medications which have NOT CHANGED    Details   levETIRAcetam (KEPPRA) 250 mg tablet Take 1 Tablet by mouth two (2) times a day. Qty: 30 Tablet, Refills: 0      FeroSuL 325 mg (65 mg iron) tablet Take 325 mg by mouth two (2) times daily (with meals). fluticasone propionate (FLONASE) 50 mcg/actuation nasal spray SHAKE LIQUID AND USE 2 SPRAYS IN EACH NOSTRIL DAILY      Symbicort 80-4.5 mcg/actuation HFAA Take 2 Puffs by inhalation daily. gabapentin (NEURONTIN) 100 mg capsule Take 100 mg by mouth three (3) times daily. loratadine (CLARITIN) 10 mg tablet Take 10 mg by mouth daily. levothyroxine (SYNTHROID) 125 mcg tablet Take 125 mcg by mouth daily. amLODIPine (NORVASC) 5 mg tablet Take 5 mg by mouth daily. timolol (TIMOPTIC) 0.5 % ophthalmic solution Administer 1 Drop to both eyes two (2) times a day. Qty: 10 mL, Refills: 0      brimonidine-timoloL (COMBIGAN) 0.2-0.5 % drop ophthalmic solution Administer 1 Drop to both eyes two (2) times a day.            STOP taking these medications       atorvastatin (LIPITOR) 80 mg tablet Comments:   Reason for Stopping:         pantoprazole (PROTONIX) 40 mg tablet Comments:   Reason for Stopping:         apixaban (ELIQUIS) 5 mg tablet Comments:   Reason for Stopping:               My Recommended Diet, Activity, Wound Care, and follow-up labs are listed in the patient's Discharge Insturctions which I have personally completed and reviewed. Risk of deterioration: High    Condition at Discharge:  Stable  _____________________________________________________________________    Disposition  Home with hospice services  ____________________________________________________________________    Care Plan discussed with:   Patient, Family, RN, Care Manager, Consultant    ____________________________________________________________________    Code Status: DNR/DNI  ____________________________________________________________________      Condition at Discharge:  Stable  _____________________________________________________________________  Follow up with:   PCP : Tracie Subramanian MD  Follow-up Information       Follow up With Specialties Details Why Contact Info    Tracie Subramanian MD Family Medicine Go on 10/12/2022 at 11:00am for your PCP hospital follow up. Jason Arcos y  [address]  [phone]      [address] [facility] [place] [address] [address] [facility] Follow up phone # [phone] fax# [phone] [facility].   [facility] [address]  [phone]                Total time in minutes spent coordinating this discharge (includes going over instructions, follow-up, prescriptions, and preparing report for sign off to her PCP) :  35 minutes    Signed:  Kush Valdivia MD

## 2022-10-11 NOTE — PROGRESS NOTES
Bedside and Verbal shift change report given to Yefri Alejandra (oncoming nurse) by Adriana Hayward RN (offgoing nurse). Report included the following information SBAR, Kardex, Intake/Output, MAR, Recent Results, and Cardiac Rhythm Atrial Paced .

## 2022-10-11 NOTE — PROGRESS NOTES
Problem: Patient Education: Go to Patient Education Activity  Goal: Patient/Family Education  Outcome: Progressing Towards Goal     Problem: Patient Education: Go to Patient Education Activity  Goal: Patient/Family Education  Outcome: Progressing Towards Goal     Problem: Patient Education: Go to Patient Education Activity  Goal: Patient/Family Education  Outcome: Progressing Towards Goal     Problem: Pressure Injury - Risk of  Goal: *Prevention of pressure injury  Description: Document Manuel Scale and appropriate interventions in the flowsheet. Outcome: Progressing Towards Goal  Note: Pressure Injury Interventions:  Sensory Interventions: Assess changes in LOC, Minimize linen layers, Turn and reposition approx. every two hours (pillows and wedges if needed)    Moisture Interventions: Absorbent underpads, Internal/External urinary devices    Activity Interventions: Pressure redistribution bed/mattress(bed type)    Mobility Interventions: Turn and reposition approx. every two hours(pillow and wedges), Float heels    Nutrition Interventions: Offer support with meals,snacks and hydration    Friction and Shear Interventions: Apply protective barrier, creams and emollients, Minimize layers                Problem: Patient Education: Go to Patient Education Activity  Goal: Patient/Family Education  Outcome: Progressing Towards Goal     Problem: Falls - Risk of  Goal: *Absence of Falls  Description: Document John Fall Risk and appropriate interventions in the flowsheet.   Outcome: Progressing Towards Goal  Note: Fall Risk Interventions:  Mobility Interventions: Bed/chair exit alarm    Mentation Interventions: Door open when patient unattended, More frequent rounding, Toileting rounds    Medication Interventions: Bed/chair exit alarm    Elimination Interventions: Call light in reach, Toileting schedule/hourly rounds              Problem: Patient Education: Go to Patient Education Activity  Goal: Patient/Family Education  Outcome: Progressing Towards Goal

## 2022-10-12 VITALS
OXYGEN SATURATION: 97 % | BODY MASS INDEX: 26.89 KG/M2 | WEIGHT: 167.33 LBS | RESPIRATION RATE: 18 BRPM | DIASTOLIC BLOOD PRESSURE: 82 MMHG | SYSTOLIC BLOOD PRESSURE: 160 MMHG | HEIGHT: 66 IN | HEART RATE: 76 BPM | TEMPERATURE: 97.6 F

## 2022-10-12 PROCEDURE — 74011250636 HC RX REV CODE- 250/636: Performed by: INTERNAL MEDICINE

## 2022-10-12 PROCEDURE — 77010033678 HC OXYGEN DAILY

## 2022-10-12 PROCEDURE — 94760 N-INVAS EAR/PLS OXIMETRY 1: CPT

## 2022-10-12 PROCEDURE — 74011250637 HC RX REV CODE- 250/637: Performed by: INTERNAL MEDICINE

## 2022-10-12 PROCEDURE — 74011250637 HC RX REV CODE- 250/637: Performed by: STUDENT IN AN ORGANIZED HEALTH CARE EDUCATION/TRAINING PROGRAM

## 2022-10-12 RX ADMIN — DEXTROSE MONOHYDRATE 125 ML/HR: 50 INJECTION, SOLUTION INTRAVENOUS at 02:25

## 2022-10-12 RX ADMIN — FERROUS SULFATE TAB 325 MG (65 MG ELEMENTAL FE) 325 MG: 325 (65 FE) TAB at 09:18

## 2022-10-12 RX ADMIN — FLUTICASONE PROPIONATE 2 SPRAY: 50 SPRAY, METERED NASAL at 09:17

## 2022-10-12 RX ADMIN — ATORVASTATIN CALCIUM 80 MG: 40 TABLET, FILM COATED ORAL at 09:19

## 2022-10-12 RX ADMIN — NYSTATIN 500000 UNITS: 100000 SUSPENSION ORAL at 09:18

## 2022-10-12 RX ADMIN — LEVOTHYROXINE SODIUM 125 MCG: 0.12 TABLET ORAL at 09:18

## 2022-10-12 RX ADMIN — AMLODIPINE BESYLATE 5 MG: 5 TABLET ORAL at 09:19

## 2022-10-12 RX ADMIN — LEVETIRACETAM 250 MG: 250 TABLET, FILM COATED ORAL at 09:20

## 2022-10-12 RX ADMIN — CETIRIZINE HYDROCHLORIDE 10 MG: 10 TABLET, FILM COATED ORAL at 09:20

## 2022-10-12 RX ADMIN — GABAPENTIN 100 MG: 100 CAPSULE ORAL at 09:19

## 2022-10-12 NOTE — PROGRESS NOTES
Bedside and Verbal shift change report given to Sung Barajas RN (oncoming nurse) by Cintia Bonilla RN (offgoing nurse). Report included the following information SBAR, Kardex, Intake/Output, MAR, and Recent Results.

## 2022-10-17 ENCOUNTER — PATIENT OUTREACH (OUTPATIENT)
Dept: CASE MANAGEMENT | Age: 87
End: 2022-10-17

## 2022-10-17 NOTE — PROGRESS NOTES
Patient has graduated from the Transitions of Care Coordination  program on 10/17/2022. [facility] Goals Addressed                   This Visit's Progress     COMPLETED: Prevent complications post hospitalization. 09/30/22  CTN spoke with patient granddaughter Fang Odom to review d/c instructions to prevent complications:    Patient will attend PCP appt 10/4/22 at 11:00  Patient will follow up with GI Dr Jules Becerra in 2 weeks, family declined CTN offer to schedule at time of call  Patient will follow up with Dr Zenon Clarke, neuro in 1 month, family declined CTN offer to schedule  Per granddaughter Claudette Augusta [facility] will Start services today at 1:30  Granddaughter reports patient daughter is out picking up new medications from pharmacy at time of call, she will call CTN with any problems regarding new meds. Reviewed med changes and d/c meds with granddaughter, no questions at this time. CTN reviewed s/sx GI bleeding with granddaughter, at time of call she reports no abnormal stools or urination, no other signs of unusual bleeding  Reviewed s/sx of CVA, granddaughter reports patient mentation and no weakness out of the ordinary at extremities reported. Reviewed s/sx MI, Afib with romel, instructed her to refer to discharge instructions if needed. Call 911 if patient has any symptoms. Reviewed pureed diet, signs of aspiration, signs of PNA to monitor for. Written instructions are on d/c sheet. CTN instructed her to call PCP at first signs that patient is having difficulty with food or medication intake. Pt out of area for [facility]    CTN will follow up next week---mkrw    10/17/22  CTN reviewed chart, patient was readmitted on 10/4 to [facility] and discharged on home hospice. CTN called [facility] and confirmed that patient is enrolled.  CTN will end KELSIE, no further outreach scheduled---mkrw              Patient has Care Transition Nurse's contact information for any further questions, concerns, or needs.   Patients upcoming visits:    Future Appointments   Date Time Provider Samantha Spears   10/26/2022 11:00 AM Anahi Snowden DO NEUM [facility]

## 2024-02-16 NOTE — PROGRESS NOTES
Addended by: NANCY NAVARRETE on: 2/16/2024 11:39 AM     Modules accepted: Orders     Occupational Therapy note:    Chart reviewed and noted patient with 6.2 hgb. Will defer and continue to follow when patient medically appropriate for OT tx session.     Mack Tse, OTR/L

## (undated) DEVICE — YANKAUER,TAPERED BULBOUS TIP,W/O VENT: Brand: MEDLINE

## (undated) DEVICE — CATH IV AUTOGRD BC PNK 20GA 25 -- INSYTE

## (undated) DEVICE — CONTAINER SPEC 20 ML LID NEUT BUFF FORMALIN 10 % POLYPR STS

## (undated) DEVICE — SYR 3ML LL TIP 1/10ML GRAD --

## (undated) DEVICE — 1200 GUARD II KIT W/5MM TUBE W/O VAC TUBE: Brand: GUARDIAN

## (undated) DEVICE — SOLIDIFIER FLD 2OZ 1500CC N DISINF IN BTL DISP SAFESORB

## (undated) DEVICE — Device

## (undated) DEVICE — FORCEPS BX L160CM DIA8MM GRSP DISECT CUP TIP NONLOCKING ROT

## (undated) DEVICE — STAIN INDIA INK IN NACL 10ML --

## (undated) DEVICE — PILL CAM

## (undated) DEVICE — ELECTRODE,RADIOTRANSLUCENT,FOAM,5PK: Brand: MEDLINE

## (undated) DEVICE — Z DISCONTINUED PER MEDLINE LINE GAS SAMPLING O2/CO2 LNG AD 13 FT NSL W/ TBNG FILTERLINE

## (undated) DEVICE — (D)AGENT INJECTN ELEVIEW 10ML -- DISC BY MFG

## (undated) DEVICE — FORCEPS BX L240CM JAW DIA2.8MM L CAP W/ NDL MIC MESH TOOTH

## (undated) DEVICE — BLOCK BITE ENDOSCP AD 21 MM W/ DIL BLU LF DISP

## (undated) DEVICE — SYR 10ML LUER LOK 1/5ML GRAD --

## (undated) DEVICE — BASIN EMSIS 16OZ GRAPHITE PLAS KID SHP MOLD GRAD FOR ORAL

## (undated) DEVICE — HYPODERMIC SAFETY NEEDLE: Brand: MAGELLAN

## (undated) DEVICE — NEONATAL-ADULT SPO2 SENSOR: Brand: NELLCOR

## (undated) DEVICE — TOWEL 4 PLY TISS 19X30 SUE WHT

## (undated) DEVICE — BAG SPEC BIOHZRD 10 X 10 IN --

## (undated) DEVICE — SET ADMIN 16ML TBNG L100IN 2 Y INJ SITE IV PIGGY BK DISP

## (undated) DEVICE — MEDI-VAC YANK SUCT HNDL W/TPRD BULBOUS TIP & NON-CONDUCTIVE: Brand: CARDINAL HEALTH

## (undated) DEVICE — NEEDLE SCLERO 25GA L240CM OD0.51MM ID0.24MM EXTN L4MM SHTH